# Patient Record
Sex: FEMALE | Race: WHITE | NOT HISPANIC OR LATINO | Employment: OTHER | URBAN - METROPOLITAN AREA
[De-identification: names, ages, dates, MRNs, and addresses within clinical notes are randomized per-mention and may not be internally consistent; named-entity substitution may affect disease eponyms.]

---

## 2020-07-20 ENCOUNTER — TELEPHONE (OUTPATIENT)
Dept: NEUROLOGY | Facility: CLINIC | Age: 60
End: 2020-07-20

## 2020-07-20 NOTE — TELEPHONE ENCOUNTER
Pt lm asking for a refill, but she would like to try the time released again. She did not mention the name of the med. [facility] chart shows that she is on Sinemet. [phone]

## 2020-07-21 NOTE — TELEPHONE ENCOUNTER
BILLIETCB patient needs to schedule a follow up appt to discuss   Jesse Carol on 7/21/2020 at 8:31 AM

## 2020-07-21 NOTE — TELEPHONE ENCOUNTER
Pt called back. I informed her of the need for a appt, which she will schedule. She will need 10 day supply of Sinemet sent to her [facility] in [place] on the corner of [address] No need to call pt.

## 2020-07-28 PROBLEM — M25.559 PAIN IN JOINT, PELVIC REGION AND THIGH: Status: ACTIVE | Noted: 2020-07-28

## 2020-07-28 PROBLEM — R26.9 ABNORMALITY OF GAIT: Status: ACTIVE | Noted: 2020-07-28

## 2020-07-28 PROBLEM — G20.A1 PARKINSON'S DISEASE (H): Status: ACTIVE | Noted: 2020-07-28

## 2020-07-29 ENCOUNTER — OFFICE VISIT (OUTPATIENT)
Dept: NEUROLOGY | Facility: CLINIC | Age: 60
End: 2020-07-29
Payer: COMMERCIAL

## 2020-07-29 VITALS — BODY MASS INDEX: 20.09 KG/M2 | WEIGHT: 125 LBS | HEIGHT: 66 IN

## 2020-07-29 DIAGNOSIS — M54.50 CHRONIC RIGHT-SIDED LOW BACK PAIN WITHOUT SCIATICA: ICD-10-CM

## 2020-07-29 DIAGNOSIS — G20.A1 PARKINSON'S DISEASE (H): Primary | ICD-10-CM

## 2020-07-29 DIAGNOSIS — G89.29 CHRONIC RIGHT-SIDED LOW BACK PAIN WITHOUT SCIATICA: ICD-10-CM

## 2020-07-29 PROCEDURE — 99214 OFFICE O/P EST MOD 30 MIN: CPT | Mod: 95 | Performed by: PSYCHIATRY & NEUROLOGY

## 2020-07-29 RX ORDER — IBUPROFEN 200 MG
200 TABLET ORAL PRN
COMMUNITY

## 2020-07-29 RX ORDER — CARBIDOPA AND LEVODOPA 50; 200 MG/1; MG/1
1 TABLET, EXTENDED RELEASE ORAL
Qty: 450 TABLET | Refills: 3 | Status: SHIPPED | OUTPATIENT
Start: 2020-07-29 | End: 2020-09-21

## 2020-07-29 RX ORDER — CARBIDOPA AND LEVODOPA 25; 100 MG/1; MG/1
2 TABLET ORAL
COMMUNITY
Start: 2020-05-08 | End: 2020-08-14

## 2020-07-29 ASSESSMENT — MIFFLIN-ST. JEOR: SCORE: 1143.75

## 2020-07-29 NOTE — LETTER
7/29/2020         RE: Marlene Merida  [address]  [facility] [address]        Dear Colleague,    Thank you for referring your patient, Marlene Merida, to the [facility] NEUROLOGY [place]. Please see a copy of my visit note below.    [facility] Neurology  [place]    Marlene Merida MRN# [id number]   Age: 60 year old YOB: 1960               Assessment and Plan:   Assessment:   Parkinson disease, low back pain        Plan:     I sent in a prescription for the long-acting carbidopa levodopa 50/200, 1 tablet 5 times a day.  Hopefully this will help with the wearing off phenomenon.  If not she can give us a call.  At that point we would have to decide whether to add further carbidopa levodopa or try 1 of the newer dopaminergic's despite the minor hallucinations she has noted, or to try entacapone.  I will plan to see her back in 6 months at the latest.  I also advised her to try some stretching for the low back pain.  If symptoms are worsening we will need to consider MRI of the lumbar spine looking for L2 or L3 radiculopathy.               Chief Complaint/HPI:     I saw this patient today for a video follow-up visit.  This is a 60-year-old woman with a history of very slowly progressing Parkinson disease.  I last saw her about a year and a half ago.  Presently she is taking carbidopa levodopa 25/100, 2 tablets about 5 times a day.  This works well for about 4 hours and then she notices significant wearing off symptoms.  Her movement starts to feel stiff.  She compares herself to the Tin Man from [facility].  Her balance will feel more tentative, though she has not had any falls.    Separately she mentions some low back pain on the right that can shoot to the right thigh and knee area.  This comes and goes.    She also notices poor sleep, that has been a problem for over 20 years now.  She did recently have a couple very slight hallucinations.  She was outside and thought there was a  "small animal nearby but when she turned to look there was nothing there.            Past Medical History:    has a past medical history of Parkinson's disease (H).          Past Surgical History:    has a past surgical history that includes Breast surgery and GYN surgery.          Social History:     Social History     Tobacco Use     Smoking status: Never Smoker     Smokeless tobacco: Never Used   Substance Use Topics     Alcohol use: Yes     Comment: 1-2 beers a month              Family History:     Family History   Problem Relation Age of Onset     Alzheimer Disease Mother      Hypertension Mother      Hypertension Father      Arthritis Father      Diabetes Paternal Grandmother      Diabetes Brother                 Allergies:   No Known Allergies          Medications:     Current Outpatient Medications:      carbidopa-levodopa (SINEMET)  MG tablet, Take 2 tablets by mouth every 5 hours Per patient, Disp: , Rfl:      ibuprofen (ADVIL/MOTRIN) 200 MG tablet, Take 200 mg by mouth as needed for mild pain, Disp: , Rfl:            Review of Systems:   No difficulty breathing or swallowing, no double vision.  Some sleep difficulty as noted above            Physical Exam:   Awake and alert with no aphasia no dysarthria  Speech is clear and coherent  Cranial nerves are fine  I do not see any focal or lateralized weakness or coordination difficulties in the arms  Rapid alternating movements are okay in the hands  Gait looks steady here on the video.          The patient has been notified of following:     \"This video visit will be conducted via a call between you and your physician/provider. We have found that certain health care needs can be provided without the need for an in-person physical exam.  This service lets us provide the care you need with a video conversation.  If a prescription is necessary we can send it directly to your pharmacy.  If lab work is needed we can place an order for that and you can then " "stop by our lab to have the test done at a later time.    Video visits are billed at different rates depending on your insurance coverage.  Please reach out to your insurance provider with any questions.    If during the course of the call the physician/provider feels a video visit is not appropriate, you will not be charged for this service.\"    Patient has given verbal consent for Video visit? Yes      Video-Visit Details    Type of service:  Video Visit    Video Start Time: 9:35 AM  Video End Time: 9:50 AM    Originating Location (pt. Location): Home  Distant Location (provider location):  [facility] NEUROLOGY [place]   Platform used for Video Visit: [facility]          Jhonatan Alvares MD         Again, thank you for allowing me to participate in the care of your patient.        Sincerely,        Jhonatan Alvares MD    "

## 2020-07-29 NOTE — NURSING NOTE
Chief Complaint   Patient presents with     Follow Up     Parkinsons Disease. Patient would like to discuss switching the carpidopa leodopa to a time release tablet      Video Visit [phone] smart phone   Jesse Medina on 7/29/2020 at 9:27 AM

## 2020-07-29 NOTE — PATIENT INSTRUCTIONS
Patient Education     Understanding Parkinson Disease    Parkinson disease is a condition that affects control over your movements. It s caused by a lack of dopamine, a chemical that helps the nerve cells in your brain communicate with each other. When dopamine is missing from certain areas of the brain, the messages that tell your body how to move are lost or distorted. This can lead to symptoms such as shaking, stiffness, and slow movement. There s no cure for Parkinson disease. But proper treatment can help ease symptoms and allow you to live a full, active life.  Changes in the brain  Dopamine is produced in a small area of the brain called the substantia nigra. For reasons that aren t yet clear, the nerve cells in this region that make dopamine begin to die. This means less dopamine is available to help control your movements. When healthy, the substantia nigra makes enough dopamine to help control your body s movements.  Symptoms of Parkinson disease  Parkinson symptoms often appear gradually. Some may take years to develop. Others you may not have at all. Below are the most common:    Shaking (resting tremor). This can affect the hands, arms, and legs. Most often, the shaking is worse on one side of the body. It usually lessens when the arm or leg (limb) is used.    Slow movement (bradykinesia). This can affect the whole body. People may walk with short, shuffling steps. They can also feel  frozen  and unable to move.    Stiffness (rigidity). This occurs when muscles don t relax. It can cause muscle aches and stooped posture.    Other symptoms. This includes balance problems, small handwriting, soft voice volume, constipation, reduced or  flat  facial expression, and sleep problems. Memory loss or other problems with thinking may also occur later in the progression of the disease.  How is Parkinson diagnosed?  There is no single test for Parkinson disease. The diagnosis is based on your symptoms, health  history, and a physical exam. You may also have tests to help rule out other problems. These may include blood tests to look for diseases that cause similar symptoms. They can also include brain-imaging tests, such as an MRI of the brain  Parkinsonism is the name for a group of brain conditions that all have symptoms similar to Parkinson disease. However, the causes of these symptoms are different. In some cases, Parkinson-type symptoms may result from strokes or head injury. They can also be caused by medicines or other diseases that affect the brain. In general, these conditions can t be treated as well using the medicines that help people with Parkinson disease.  Date Last Reviewed: 2/1/2018 2000-2019 [employer]. [address] [place] [address], [place] [address]. All rights reserved. This information is not intended as a substitute for professional medical care. Always follow your healthcare professional's instructions.

## 2020-07-29 NOTE — PROGRESS NOTES
[facility] Neurology  [place]    Marlene Merida MRN# [id number]   Age: 60 year old YOB: 1960               Assessment and Plan:   Assessment:   Parkinson disease, low back pain        Plan:     I sent in a prescription for the long-acting carbidopa levodopa 50/200, 1 tablet 5 times a day.  Hopefully this will help with the wearing off phenomenon.  If not she can give us a call.  At that point we would have to decide whether to add further carbidopa levodopa or try 1 of the newer dopaminergic's despite the minor hallucinations she has noted, or to try entacapone.  I will plan to see her back in 6 months at the latest.  I also advised her to try some stretching for the low back pain.  If symptoms are worsening we will need to consider MRI of the lumbar spine looking for L2 or L3 radiculopathy.               Chief Complaint/HPI:     I saw this patient today for a video follow-up visit.  This is a 60-year-old woman with a history of very slowly progressing Parkinson disease.  I last saw her about a year and a half ago.  Presently she is taking carbidopa levodopa 25/100, 2 tablets about 5 times a day.  This works well for about 4 hours and then she notices significant wearing off symptoms.  Her movement starts to feel stiff.  She compares herself to the Tin Man from [facility].  Her balance will feel more tentative, though she has not had any falls.    Separately she mentions some low back pain on the right that can shoot to the right thigh and knee area.  This comes and goes.    She also notices poor sleep, that has been a problem for over 20 years now.  She did recently have a couple very slight hallucinations.  She was outside and thought there was a small animal nearby but when she turned to look there was nothing there.            Past Medical History:    has a past medical history of Parkinson's disease (H).          Past Surgical History:    has a past surgical history that includes Breast  "surgery and GYN surgery.          Social History:     Social History     Tobacco Use     Smoking status: Never Smoker     Smokeless tobacco: Never Used   Substance Use Topics     Alcohol use: Yes     Comment: 1-2 beers a month              Family History:     Family History   Problem Relation Age of Onset     Alzheimer Disease Mother      Hypertension Mother      Hypertension Father      Arthritis Father      Diabetes Paternal Grandmother      Diabetes Brother                 Allergies:   No Known Allergies          Medications:     Current Outpatient Medications:      carbidopa-levodopa (SINEMET)  MG tablet, Take 2 tablets by mouth every 5 hours Per patient, Disp: , Rfl:      ibuprofen (ADVIL/MOTRIN) 200 MG tablet, Take 200 mg by mouth as needed for mild pain, Disp: , Rfl:            Review of Systems:   No difficulty breathing or swallowing, no double vision.  Some sleep difficulty as noted above            Physical Exam:   Awake and alert with no aphasia no dysarthria  Speech is clear and coherent  Cranial nerves are fine  I do not see any focal or lateralized weakness or coordination difficulties in the arms  Rapid alternating movements are okay in the hands  Gait looks steady here on the video.          The patient has been notified of following:     \"This video visit will be conducted via a call between you and your physician/provider. We have found that certain health care needs can be provided without the need for an in-person physical exam.  This service lets us provide the care you need with a video conversation.  If a prescription is necessary we can send it directly to your pharmacy.  If lab work is needed we can place an order for that and you can then stop by our lab to have the test done at a later time.    Video visits are billed at different rates depending on your insurance coverage.  Please reach out to your insurance provider with any questions.    If during the course of the call the " "physician/provider feels a video visit is not appropriate, you will not be charged for this service.\"    Patient has given verbal consent for Video visit? Yes      Video-Visit Details    Type of service:  Video Visit    Video Start Time: 9:35 AM  Video End Time: 9:50 AM    Originating Location (pt. Location): [place]  Distant Location (provider location):  [facility] NEUROLOGY [place]   Platform used for Video Visit: Richie Alvares MD         "

## 2020-08-04 ENCOUNTER — TELEPHONE (OUTPATIENT)
Dept: NEUROLOGY | Facility: CLINIC | Age: 60
End: 2020-08-04

## 2020-08-04 DIAGNOSIS — G20.A1 PARKINSON'S DISEASE (H): Primary | ICD-10-CM

## 2020-08-05 NOTE — TELEPHONE ENCOUNTER
I spoke to Marlene and told her the new dose (50/200 ER) takes the place of the old formulation, so she will only be taking the 1 tab 5x a day.

## 2020-08-13 NOTE — TELEPHONE ENCOUNTER
Pt lm that she would like to go back to the 25/100. The delayed release is to hard on her. She is dizzy, and uncomfortable. [phone]

## 2020-08-14 RX ORDER — CARBIDOPA AND LEVODOPA 25; 100 MG/1; MG/1
2 TABLET ORAL
Qty: 900 TABLET | Refills: 3 | Status: SHIPPED | OUTPATIENT
Start: 2020-08-14 | End: 2021-04-15

## 2020-08-14 NOTE — TELEPHONE ENCOUNTER
Left message relaying that old dose was resent to pharmacy and could be started after picking up. Advised to call back if she had any additional questions   Jesse Medina on 8/14/2020 at 1:30 PM

## 2020-08-18 NOTE — TELEPHONE ENCOUNTER
Pt lm that she has a black/purple discoloration in the corner of her eye. Should she be concerned ? [phone]

## 2020-08-19 NOTE — TELEPHONE ENCOUNTER
Please call. This is not a known side effect of Sinemet or other parkinson meds.  If finding more bruising or bleeding then should check with PMD.  Thanks.

## 2020-08-19 NOTE — TELEPHONE ENCOUNTER
Left detailed message relaying the below information. Advised patient to return call if she had further questions   Jesse Medina on 8/19/2020 at 2:39 PM

## 2020-08-28 NOTE — TELEPHONE ENCOUNTER
Pt left msg asking if she could take OTC magnesium and Ibuprofen with her current meds. Didn't say what meds she's currently on. Please advise. [phone].

## 2020-09-03 NOTE — TELEPHONE ENCOUNTER
Pt lm with more med issues. Med not lasting long enough. I called her back and scheduled a visit for 9-21. She will call sooner, if needed

## 2020-09-21 ENCOUNTER — VIRTUAL VISIT (OUTPATIENT)
Dept: NEUROLOGY | Facility: CLINIC | Age: 60
End: 2020-09-21
Payer: COMMERCIAL

## 2020-09-21 VITALS — BODY MASS INDEX: 20.89 KG/M2 | WEIGHT: 130 LBS | HEIGHT: 66 IN

## 2020-09-21 DIAGNOSIS — G20.A1 PARKINSON'S DISEASE (H): Primary | ICD-10-CM

## 2020-09-21 DIAGNOSIS — G89.29 CHRONIC RIGHT-SIDED LOW BACK PAIN WITHOUT SCIATICA: ICD-10-CM

## 2020-09-21 DIAGNOSIS — M54.50 CHRONIC RIGHT-SIDED LOW BACK PAIN WITHOUT SCIATICA: ICD-10-CM

## 2020-09-21 PROCEDURE — 99214 OFFICE O/P EST MOD 30 MIN: CPT | Mod: 95 | Performed by: PSYCHIATRY & NEUROLOGY

## 2020-09-21 RX ORDER — PRAMIPEXOLE DIHYDROCHLORIDE 0.5 MG/1
0.5 TABLET ORAL 3 TIMES DAILY
Qty: 90 TABLET | Refills: 6 | Status: SHIPPED | OUTPATIENT
Start: 2020-09-21 | End: 2023-08-08 | Stop reason: ALTCHOICE

## 2020-09-21 RX ORDER — PRAMIPEXOLE DIHYDROCHLORIDE 0.25 MG/1
0.25 TABLET ORAL 3 TIMES DAILY
Qty: 42 TABLET | Refills: 0 | Status: SHIPPED | OUTPATIENT
Start: 2020-09-21 | End: 2020-10-05

## 2020-09-21 ASSESSMENT — MIFFLIN-ST. JEOR: SCORE: 1166.43

## 2020-09-21 NOTE — TELEPHONE ENCOUNTER
Pt left msg stating that she was prescribed 2 medications but only got 1 from pharmacy. She did not say what the 2 meds were or which 1 she picked up. She also states that she's worried about some of the side effects of the medication, some which she already has and is afraid medication will make them worse. Pt did not say or describe the side effects she's concerned about. She's requesting a call back at [phone].

## 2020-09-21 NOTE — PROGRESS NOTES
[facility] Neurology  [place]    Marlene Merida MRN# [id number]   Age: 60 year old YOB: 1960               Assessment and Plan:   Assessment:   Parkinson disease  Low back pain        Plan:     She is already on a good dose of carbidopa levodopa (2 tablets 5 times a day).  We will add Mirapex to see if that will help with her gait and balance.  I warned her about potential side effects including hallucinations and compulsive behaviors.  We will see if this also helps with the low back pain which could potentially be a nonmotor symptom of the Parkinson's.  I also like to have her see physical therapy which will help on both fronts as well.  I asked her to check in with us again in 6 months (either virtual visit or here in the clinic).             Chief Complaint/HPI:     I saw Marlene for a video visit today regarding Parkinson's.  I last saw her in early 2019.  Since then we have increased her carbidopa levodopa a little bit to 2 tablets 5 times a day.  Some days are better than others.  She does have some balance difficulties.  She describes feeling like she is walking on a cloud (unsteady).  She has not had any actual falls.  She has, however, avoided events like graduation and a  recently because she was not sure if she would stumble and embarrass herself.  She has not had any samia hallucinations but may be some visual misperceptions.  She mentions also some low back pain on the right at the belt line.  She has used some lidocaine gel with good relief there.  She read about a Parkinson's patient on line who had benefit with marni chi and wonders about other efforts that might help her.            Past Medical History:    has a past medical history of Parkinson's disease (H).          Past Surgical History:    has a past surgical history that includes Breast surgery and GYN surgery.          Social History:     Social History     Tobacco Use     Smoking status: Never Smoker     Smokeless  "tobacco: Never Used   Substance Use Topics     Alcohol use: Yes     Comment: 1-2 beers a month              Family History:     Family History   Problem Relation Age of Onset     Alzheimer Disease Mother      Hypertension Mother      Hypertension Father      Arthritis Father      Diabetes Paternal Grandmother      Diabetes Brother                 Allergies:   No Known Allergies          Medications:     Current Outpatient Medications:      carbidopa-levodopa (SINEMET)  MG tablet, Take 2 tablets by mouth 5 times daily Per patient, Disp: 900 tablet, Rfl: 3     ibuprofen (ADVIL/MOTRIN) 200 MG tablet, Take 200 mg by mouth as needed for mild pain, Disp: , Rfl:      pramipexole (MIRAPEX) 0.25 MG tablet, Take 1 tablet (0.25 mg) by mouth 3 times daily for 14 days (starting dose), Disp: 42 tablet, Rfl: 0     pramipexole (MIRAPEX) 0.5 MG tablet, Take 1 tablet (0.5 mg) by mouth 3 times daily, Disp: 90 tablet, Rfl: 6           Review of Systems:   No difficulty breathing or swallowing, she does mention some discoloration of the feet without numbness or tingling or poor circulation            Physical Exam:   Awake and alert with no aphasia no dysarthria  Speech is clear and coherent, no hypophonia  Cranial nerves are fine  Rapid alternating movements are actually quite good      The patient has been notified of following:     \"This video visit will be conducted via a call between you and your physician/provider. We have found that certain health care needs can be provided without the need for an in-person physical exam.  This service lets us provide the care you need with a video conversation.  If a prescription is necessary we can send it directly to your pharmacy.  If lab work is needed we can place an order for that and you can then stop by our lab to have the test done at a later time.    Video visits are billed at different rates depending on your insurance coverage.  Please reach out to your insurance provider with " "any questions.    If during the course of the call the physician/provider feels a video visit is not appropriate, you will not be charged for this service.\"    Patient has given verbal consent for Video visit? Yes      Video-Visit Details    Type of service:  Video Visit    Video Start Time: 8:43 AM  Video End Time: 9:02 AM    Originating Location (pt. Location): Home  Distant Location (provider location):  [facility]   Platform used for Video Visit: Richie Alvares MD         "

## 2020-09-21 NOTE — NURSING NOTE
Chief Complaint   Patient presents with     Follow Up     Parkinsons Disease/low back pain- states she is still having pain on and off. The icy hot and stretching has helped a bit      Would like your thoughts on taking magnesium.   Video Visit through deja Medina on 9/21/2020 at 8:28 AM

## 2020-09-21 NOTE — PATIENT INSTRUCTIONS
Patient Education     Exercises to Strengthen Your Lower Back  Strong lower back and abdominal muscles work together to support your spine. The exercises below will help strengthen the lower back. It is important that you begin exercising slowly and increase levels gradually.  Always begin any exercise program with stretching. If you feel pain while doing any of these exercises, stop and talk to your doctor about a more specific exercise program that better suits your condition.   Low back stretch  The point of stretching is to make you more flexible and increase your range of motion. Stretch only as much as you are able. Stretch slowly. Do not push your stretch to the limit. If at any point you feel pain while stretching, this is your (temporary) limit.    Lie on your back with your knees bent and both feet on the ground.    Slowly raise your left knee to your chest as you flatten your lower back against the floor. Hold for 5 seconds.    Relax and repeat the exercise with your right knee.    Do 10 of these exercises for each leg.    Repeat hugging both knees to your chest at the same time.  Building lower back strength  Start your exercise routine with 10 to 30 minutes a day, 1 to 3 times a day.  Initial exercises  Lying on your back:  1. Ankle pumps: Move your foot up and down, towards your head, and then away. Repeat 10 times with each foot.  2. Heel slides: Slowly bend your knee, drawing the heel of your foot towards you. Then slide your heel/foot from you, straightening your knee. Do not lift your foot off the floor (this is not a leg lift).  3. Abdominal contraction: Bend your knees and put your hands on your stomach. Tighten your stomach muscles. Hold for 5 seconds, then relax. Repeat 10 times.  4. Straight leg raise: Bend one leg at the knee and keep the other leg straight. Tighten your stomach muscles. Slowly lift your straight leg 6 to 12 inches off the floor and hold for up to 5 seconds. Repeat 10 times  on each side.  Standin. Wall squats: Stand with your back against the wall. Move your feet about 12 inches away from the wall. Tighten your stomach muscles, and slowly bend your knees until they are at about a 45 degree angle. Do not go down too far. Hold about 5 seconds. Then slowly return to your starting position. Repeat 10 times.  2. Heel raises: Stand facing the wall. Slowly raise the heels of your feet up and down, while keeping your toes on the floor. If you have trouble balancing, you can touch the wall with your hands. Repeat 10 times.  More advanced exercises  When you feel comfortable enough, try these exercises.  1. Kneeling lumbar extension: Begin on your hands and knees. At the same time, raise and straighten your right arm and left leg until they are parallel to the ground. Hold for 2 seconds and come back slowly to a starting position. Repeat with left arm and right leg, alternating 10 times.  2. Prone lumbar extension: Lie face down, arms extended overhead, palms on the floor. At the same time, raise your right arm and left leg as high as comfortably possible. Hold for 10 seconds and slowly return to start. Repeat with left arm and right leg, alternating 10 times. Gradually build up to 20 times. (Advanced: Repeat this exercise raising both arms and both legs a few inches off the floor at the same time. Hold for 5 seconds and release.)  3. Pelvic tilt: Lie on the floor on your back with your knees bent at 90 degrees. Your feet should be flat on the floor. Inhale, exhale, then slowly contract your abdominal muscles bringing your navel toward your spine. Let your pelvis rock back until your lower back is flat on the floor. Hold for 10 seconds while breathing smoothly.  4. Abdominal crunch: Perform a pelvic tilt (above) flattening your lower back against the floor. Holding the tension in your abdominal muscles, take another breath and raise your shoulder blades off the ground (this is not a full  sit-up). Keep your head in line with your body (don t bend your neck forward). Hold for 2 seconds, then slowly lower.  Date Last Reviewed: 6/1/2016 2000-2019 [employer]. [address] [place] [address], [place] [address]. All rights reserved. This information is not intended as a substitute for professional medical care. Always follow your healthcare professional's instructions.

## 2020-09-21 NOTE — LETTER
2020         RE: Marlene Merida  [address]  [facility] [address]        Dear Colleague,    Thank you for referring your patient, Marlene Merida, to the [facility] [place]. Please see a copy of my visit note below.    [facility]  [place]    Marlene Merida MRN# [id number]   Age: 60 year old YOB: 1960               Assessment and Plan:   Assessment:   Parkinson disease  Low back pain        Plan:     She is already on a good dose of carbidopa levodopa (2 tablets 5 times a day).  We will add Mirapex to see if that will help with her gait and balance.  I warned her about potential side effects including hallucinations and compulsive behaviors.  We will see if this also helps with the low back pain which could potentially be a nonmotor symptom of the Parkinson's.  I also like to have her see physical therapy which will help on both fronts as well.  I asked her to check in with us again in 6 months (either virtual visit or here in the clinic).             Chief Complaint/HPI:     I saw Marlene for a video visit today regarding Parkinson's.  I last saw her in early 2019.  Since then we have increased her carbidopa levodopa a little bit to 2 tablets 5 times a day.  Some days are better than others.  She does have some balance difficulties.  She describes feeling like she is walking on a cloud (unsteady).  She has not had any actual falls.  She has, however, avoided events like graduation and a  recently because she was not sure if she would stumble and embarrass herself.  She has not had any samia hallucinations but may be some visual misperceptions.  She mentions also some low back pain on the right at the belt line.  She has used some lidocaine gel with good relief there.  She read about a Parkinson's patient on line who had benefit with marni chi and wonders about other efforts that might help her.            Past Medical History:    has a past medical  "history of Parkinson's disease (H).          Past Surgical History:    has a past surgical history that includes Breast surgery and GYN surgery.          Social History:     Social History     Tobacco Use     Smoking status: Never Smoker     Smokeless tobacco: Never Used   Substance Use Topics     Alcohol use: Yes     Comment: 1-2 beers a month              Family History:     Family History   Problem Relation Age of Onset     Alzheimer Disease Mother      Hypertension Mother      Hypertension Father      Arthritis Father      Diabetes Paternal Grandmother      Diabetes Brother                 Allergies:   No Known Allergies          Medications:     Current Outpatient Medications:      carbidopa-levodopa (SINEMET)  MG tablet, Take 2 tablets by mouth 5 times daily Per patient, Disp: 900 tablet, Rfl: 3     ibuprofen (ADVIL/MOTRIN) 200 MG tablet, Take 200 mg by mouth as needed for mild pain, Disp: , Rfl:      pramipexole (MIRAPEX) 0.25 MG tablet, Take 1 tablet (0.25 mg) by mouth 3 times daily for 14 days (starting dose), Disp: 42 tablet, Rfl: 0     pramipexole (MIRAPEX) 0.5 MG tablet, Take 1 tablet (0.5 mg) by mouth 3 times daily, Disp: 90 tablet, Rfl: 6           Review of Systems:   No difficulty breathing or swallowing, she does mention some discoloration of the feet without numbness or tingling or poor circulation            Physical Exam:   Awake and alert with no aphasia no dysarthria  Speech is clear and coherent, no hypophonia  Cranial nerves are fine  Rapid alternating movements are actually quite good      The patient has been notified of following:     \"This video visit will be conducted via a call between you and your physician/provider. We have found that certain health care needs can be provided without the need for an in-person physical exam.  This service lets us provide the care you need with a video conversation.  If a prescription is necessary we can send it directly to your pharmacy.  If lab " "work is needed we can place an order for that and you can then stop by our lab to have the test done at a later time.    Video visits are billed at different rates depending on your insurance coverage.  Please reach out to your insurance provider with any questions.    If during the course of the call the physician/provider feels a video visit is not appropriate, you will not be charged for this service.\"    Patient has given verbal consent for Video visit? Yes      Video-Visit Details    Type of service:  Video Visit    Video Start Time: 8:43 AM  Video End Time: 9:02 AM    Originating Location (pt. Location): Home  Distant Location (provider location):  [facility] [place]   Platform used for Video Visit: [facility]          Jhonatan Alvares MD         Again, thank you for allowing me to participate in the care of your patient.        Sincerely,        Jhonatan Alvares MD    "

## 2020-09-22 ENCOUNTER — TELEPHONE (OUTPATIENT)
Dept: NEUROLOGY | Facility: CLINIC | Age: 60
End: 2020-09-22

## 2020-09-22 DIAGNOSIS — G20.A1 PARKINSON'S DISEASE (H): Primary | ICD-10-CM

## 2020-09-22 NOTE — TELEPHONE ENCOUNTER
"Pt lm asking if there is a different med that is not so costley and the side effects are not so \"scary\". She stated that the two meds prescribed are very expensive. [phone]  "

## 2020-09-22 NOTE — TELEPHONE ENCOUNTER
I tried calling, no answer, I left voicemail that since she is worried about side effects she doesn't have to take the new medication I prescribed (I assume she was concerned about the pramipexole).  I told her to continue taking the Sinemet.

## 2020-09-24 NOTE — TELEPHONE ENCOUNTER
"Pt left msg requesting alternative med. She complained about cost and side effects. Per pt, the current med that she's on start with \"S\".   "

## 2020-09-25 RX ORDER — SELEGILINE HYDROCHLORIDE 5 MG/1
5 TABLET ORAL 2 TIMES DAILY WITH MEALS
Qty: 180 TABLET | Refills: 3 | Status: SHIPPED | OUTPATIENT
Start: 2020-09-25 | End: 2023-08-08

## 2020-09-25 NOTE — TELEPHONE ENCOUNTER
I spoke to Marlene on the phone today, she is not taking the pramipexole, she is taking the carbidopa levodopa as before.  In terms of alternative Parkinson's medication, she is not on any serotonin medications so we will try selegiline.  I sent that prescription to her pharmacy electronically.

## 2020-10-01 NOTE — TELEPHONE ENCOUNTER
Spoke with patient and relayed message below. Patient verbalized understanding with no further needs at this time   Jesse Medina CMA on 10/1/2020 at 1:00 PM  '

## 2020-10-14 NOTE — TELEPHONE ENCOUNTER
Spoke with patient and relayed below message. She verbalized understanding with no further questions   Jesse Medina CMA on 10/14/2020 at 1:30 PM

## 2020-10-14 NOTE — TELEPHONE ENCOUNTER
Pt left msg asking if she can stop taking Selegeiine. She reported difficulty with movement, stiff neck and HA.

## 2020-10-21 ENCOUNTER — TELEPHONE (OUTPATIENT)
Dept: NEUROLOGY | Facility: CLINIC | Age: 60
End: 2020-10-21

## 2020-10-21 DIAGNOSIS — G20.A1 PARKINSON'S DISEASE (H): Primary | ICD-10-CM

## 2020-10-21 NOTE — TELEPHONE ENCOUNTER
"Spoke to pt. She states she feels the carbidopa-levodopa 25/100 is wearing off too soon.  She states she feels great the first 3 hours after taking, but then feels it stat to wear off at the 4th hour and feels like she is in \"disarray.\"  She is currently taking carbidopa-levodopa 25/100 5 times per day.    Please advise.  "

## 2020-10-23 RX ORDER — ENTACAPONE 200 MG/1
200 TABLET ORAL
Qty: 150 TABLET | Refills: 6 | Status: SHIPPED | OUTPATIENT
Start: 2020-10-23 | End: 2021-04-15

## 2020-10-23 NOTE — TELEPHONE ENCOUNTER
Please call, can try entacapone in addition to the Sinemet.  I sent Rx to the pharmacy.  Thanks.     Routing comment      Spoke with patient and relayed above message. Patient verbalized understanding with no further questions   Jesse Medina CMA on 10/23/2020 at 3:05 PM

## 2020-10-28 NOTE — TELEPHONE ENCOUNTER
Pt called wanting to clarify med dose. Is she now to stop the Sinemet 25/100 and just stay on the 50/200, along with the Entacappone ? OK to leave detailed message [phone]

## 2020-10-28 NOTE — TELEPHONE ENCOUNTER
Spoke with patient and relayed previous message. She verbalized understanding with no further questions   Jesse Medina CMA on 10/28/2020 at 10:27 AM

## 2021-01-04 ENCOUNTER — HEALTH MAINTENANCE LETTER (OUTPATIENT)
Age: 61
End: 2021-01-04

## 2021-04-13 ENCOUNTER — TELEPHONE (OUTPATIENT)
Dept: NEUROLOGY | Facility: CLINIC | Age: 61
End: 2021-04-13

## 2021-04-13 DIAGNOSIS — G20.A1 PARKINSON'S DISEASE (H): ICD-10-CM

## 2021-04-13 NOTE — TELEPHONE ENCOUNTER
Spoke to Marlene. I informed her she takes the IM Sinemet. She wanted to see in the notes if they ever talked about switching to CR. Pt stated Sinemet wears off in the last hour before her next dose. She called about the same issue in October. I let her know she should schedule an appt to discuss.   Lianna Vega, CMA on 4/13/2021 at 2:57 PM

## 2021-04-13 NOTE — TELEPHONE ENCOUNTER
Pt called to go over medication. She is not sure if he is on a extended release Sinemet or not. If so, she'll need it refilled. [phone]

## 2021-04-14 NOTE — TELEPHONE ENCOUNTER
Pt called back to ask if she really needs to be seen. She was in for her yearly in Sept and thought she had discussed medication wearing off too soon, and a timed release was going to be tried. Please advise. [phone]

## 2021-04-15 RX ORDER — ENTACAPONE 200 MG/1
200 TABLET ORAL
Qty: 450 TABLET | Refills: 3 | Status: SHIPPED | OUTPATIENT
Start: 2021-04-15 | End: 2022-02-08

## 2021-04-15 RX ORDER — CARBIDOPA AND LEVODOPA 25; 100 MG/1; MG/1
2 TABLET ORAL
Qty: 900 TABLET | Refills: 3 | Status: SHIPPED | OUTPATIENT
Start: 2021-04-15 | End: 2021-07-15

## 2021-04-15 NOTE — TELEPHONE ENCOUNTER
I spoke to Marlene on the phone today.  She says that her pharmacist asked about the long-acting carbidopa levodopa (I believe she had been on that in the past because my prescription from August 2020 says that it would take the place of the extended release).    I also discussed with Marlene that we have had numerous calls back and forth about what she is taking or should take.  With that I think we should keep the prescriptions the same for now.  As best I can tell this is carbidopa levodopa , 2 tablets 5 times a day and entacapone 200 mg 5 times a day at the same times.  I did renew those prescriptions today.

## 2021-05-31 ENCOUNTER — RECORDS - HEALTHEAST (OUTPATIENT)
Dept: ADMINISTRATIVE | Facility: CLINIC | Age: 61
End: 2021-05-31

## 2021-06-01 ENCOUNTER — RECORDS - HEALTHEAST (OUTPATIENT)
Dept: ADMINISTRATIVE | Facility: CLINIC | Age: 61
End: 2021-06-01

## 2021-06-16 ENCOUNTER — TELEPHONE (OUTPATIENT)
Dept: NEUROLOGY | Facility: CLINIC | Age: 61
End: 2021-06-16

## 2021-06-16 NOTE — TELEPHONE ENCOUNTER
Marlene Dumont wants to know with the medications you prescribed to her can she get the covid 19 vaccination? Please let her know at [phone] Thank you!

## 2021-07-03 ENCOUNTER — HOSPITAL ENCOUNTER (EMERGENCY)
Dept: EMERGENCY MEDICINE | Facility: HOSPITAL | Age: 61
Discharge: HOME | End: 2021-07-03
Attending: EMERGENCY MEDICINE
Payer: COMMERCIAL

## 2021-07-03 DIAGNOSIS — K59.00 CONSTIPATION, UNSPECIFIED CONSTIPATION TYPE: ICD-10-CM

## 2021-07-03 DIAGNOSIS — N39.0 URINARY TRACT INFECTION WITHOUT HEMATURIA, SITE UNSPECIFIED: ICD-10-CM

## 2021-07-03 LAB
ALBUMIN SERPL-MCNC: 4 G/DL (ref 3.5–5)
ALBUMIN UR-MCNC: NEGATIVE G/DL
ALP SERPL-CCNC: 68 U/L (ref 45–120)
ALT SERPL W P-5'-P-CCNC: <9 U/L (ref 0–45)
ANION GAP SERPL CALCULATED.3IONS-SCNC: 9 MMOL/L (ref 5–18)
APPEARANCE UR: CLEAR
AST SERPL W P-5'-P-CCNC: 22 U/L (ref 0–40)
BACTERIA #/AREA URNS HPF: ABNORMAL /[HPF]
BASOPHILS # BLD AUTO: 0 THOU/UL (ref 0–0.2)
BASOPHILS NFR BLD AUTO: 1 % (ref 0–2)
BILIRUB SERPL-MCNC: 0.5 MG/DL (ref 0–1)
BILIRUB UR QL STRIP: NEGATIVE
BUN SERPL-MCNC: 12 MG/DL (ref 8–22)
CALCIUM SERPL-MCNC: 9 MG/DL (ref 8.5–10.5)
CHLORIDE BLD-SCNC: 106 MMOL/L (ref 98–107)
CO2 SERPL-SCNC: 27 MMOL/L (ref 22–31)
COLOR UR AUTO: YELLOW
CREAT SERPL-MCNC: 0.74 MG/DL (ref 0.6–1.1)
EOSINOPHIL # BLD AUTO: 0 THOU/UL (ref 0–0.4)
EOSINOPHIL NFR BLD AUTO: 0 % (ref 0–6)
ERYTHROCYTE [DISTWIDTH] IN BLOOD BY AUTOMATED COUNT: 13 % (ref 11–14.5)
GFR SERPL CREATININE-BSD FRML MDRD: >60 ML/MIN/1.73M2
GLUCOSE BLD-MCNC: 104 MG/DL (ref 70–125)
GLUCOSE UR STRIP-MCNC: NEGATIVE MG/DL
HCT VFR BLD AUTO: 41.7 % (ref 35–47)
HGB BLD-MCNC: 13.8 G/DL (ref 12–16)
HGB UR QL STRIP: NEGATIVE
IMM GRANULOCYTES # BLD: 0 THOU/UL
IMM GRANULOCYTES NFR BLD: 1 %
KETONES UR STRIP-MCNC: ABNORMAL MG/DL
LEUKOCYTE ESTERASE UR QL STRIP: ABNORMAL
LIPASE SERPL-CCNC: 18 U/L (ref 0–52)
LYMPHOCYTES # BLD AUTO: 1.1 THOU/UL (ref 0.8–4.4)
LYMPHOCYTES NFR BLD AUTO: 13 % (ref 20–40)
MCH RBC QN AUTO: 32.2 PG (ref 27–34)
MCHC RBC AUTO-ENTMCNC: 33.1 G/DL (ref 32–36)
MCV RBC AUTO: 97 FL (ref 80–100)
MONOCYTES # BLD AUTO: 0.5 THOU/UL (ref 0–0.9)
MONOCYTES NFR BLD AUTO: 6 % (ref 2–10)
MUCOUS THREADS #/AREA URNS LPF: PRESENT LPF
NEUTROPHILS # BLD AUTO: 7 THOU/UL (ref 2–7.7)
NEUTROPHILS NFR BLD AUTO: 81 % (ref 50–70)
NITRATE UR QL: NEGATIVE
PH UR STRIP: 5.5 [PH] (ref 5–8)
PLATELET # BLD AUTO: 299 THOU/UL (ref 140–440)
PMV BLD AUTO: 9.8 FL (ref 8.5–12.5)
POTASSIUM BLD-SCNC: 3.8 MMOL/L (ref 3.5–5)
PROT SERPL-MCNC: 6.9 G/DL (ref 6–8)
RBC # BLD AUTO: 4.28 MILL/UL (ref 3.8–5.4)
RBC URINE: 0 HPF
SODIUM SERPL-SCNC: 142 MMOL/L (ref 136–145)
SP GR UR STRIP: 1.03 (ref 1–1.03)
SQUAMOUS EPITHELIAL: 1 /HPF
UROBILINOGEN UR STRIP-ACNC: ABNORMAL
WBC CLUMPS #/AREA URNS HPF: PRESENT /[HPF]
WBC URINE: 1 HPF
WBC: 8.7 THOU/UL (ref 4–11)

## 2021-07-04 LAB — BACTERIA SPEC CULT: NO GROWTH

## 2021-07-04 NOTE — ED PROVIDER NOTES
ED Provider Notes by Darryl Elaine MD at 7/3/2021  3:11 PM     Author: Darryl Elaine MD Service: -- Author Type: Physician    Filed: 7/3/2021  5:59 PM Date of Service: 7/3/2021  3:11 PM Status: Signed    : Darryl Elaine MD (Physician)       eMERGENCY dEPARTMENT eNCOUnter      CHIEF COMPLAINT    Chief Complaint   Patient presents with   ? Constipation       HPI    Marlene Merida is a 61 y.o. female with a relevent medical history of parkinson's disease and abnormality of gait presents to the ED via private car with chief complaint of constipation.    Patient reports mid abdominal pain that she associates to constipation and notes her last bowel moment being yesterday. Patient denies nay additional symptoms at this time.    PAST MEDICAL HISTORY    Past Medical History:   Diagnosis Date   ? Walk Is Wobbly Or Unsteady (Ataxia)     Created by Conversion      No past surgical history on file.    CURRENT MEDICATIONS      Current Facility-Administered Medications:   ?  [COMPLETED] Insert peripheral IV, , , Once **AND** [COMPLETED] Saline lock IV, , , Once **AND** sodium chloride flush 10 mL (NS), 10 mL, Intravenous, PRN, Darryl Elaine MD    Current Outpatient Medications:   ?  cephalexin (KEFLEX) 500 MG capsule, Take 1 capsule (500 mg total) by mouth 2 (two) times a day for 5 days., Disp: 10 capsule, Rfl: 0  ?  cyclobenzaprine (FLEXERIL) 10 MG tablet, Take 1 tablet (10 mg total) by mouth 2 (two) times a day as needed for muscle spasms., Disp: 20 tablet, Rfl: 0  ?  polyethylene glycol (MIRALAX) 17 gram packet, Take 1 packet (17 g total) by mouth daily as needed (Until symptoms resolve)., Disp: 14 each, Rfl: 0    ALLERGIES    No Known Allergies     FAMILY HISTORY    No family history on file.    SOCIAL HISTORY    Social History     Socioeconomic History   ? Marital status:      Spouse name: Not on file   ? Number of children: Not on file   ? Years of education: Not on file   ? Highest  education level: Not on file   Occupational History   ? Not on file   Social Needs   ? Financial resource strain: Not on file   ? Food insecurity     Worry: Not on file     Inability: Not on file   ? Transportation needs     Medical: Not on file     Non-medical: Not on file   Tobacco Use   ? Smoking status: Not on file   Substance and Sexual Activity   ? Alcohol use: Not on file   ? Drug use: Not on file   ? Sexual activity: Not on file   Lifestyle   ? Physical activity     Days per week: Not on file     Minutes per session: Not on file   ? Stress: Not on file   Relationships   ? Social connections     Talks on phone: Not on file     Gets together: Not on file     Attends Christian service: Not on file     Active member of club or organization: Not on file     Attends meetings of clubs or organizations: Not on file     Relationship status: Not on file   ? Intimate partner violence     Fear of current or ex partner: Not on file     Emotionally abused: Not on file     Physically abused: Not on file     Forced sexual activity: Not on file   Other Topics Concern   ? Not on file   Social History Narrative   ? Not on file       REVIEW OF SYSTEMS      Review of Systems   Gastrointestinal: Positive for abdominal pain (mid) and constipation.   All other systems reviewed and are negative.      PHYSICAL EXAM    VITAL SIGNS: /59   Pulse 83   Temp 98.5  F (36.9  C) (Oral)   Resp 18   SpO2 98%     Physical Exam   Constitutional: She appears well-developed and well-nourished. No distress.   HENT:   Head: Normocephalic and atraumatic.   Eyes: Conjunctivae are normal.   Neck: Neck supple.   Cardiovascular: Normal rate and regular rhythm.   Pulmonary/Chest: Effort normal and breath sounds normal.   Abdominal: Soft. She exhibits no distension. There is no rebound and no guarding.   Musculoskeletal:         General: No edema.   Neurological: She is alert.   Skin: Skin is warm and dry.   Psychiatric: She has a normal mood and  affect.   Nursing note and vitals reviewed.      LABS  Pertinent lab results reviewed in chart.  Results for orders placed or performed during the hospital encounter of 07/03/21   Comprehensive metabolic panel   Result Value Ref Range    Sodium 142 136 - 145 mmol/L    Potassium 3.8 3.5 - 5.0 mmol/L    Chloride 106 98 - 107 mmol/L    CO2 27 22 - 31 mmol/L    Anion Gap, Calculation 9 5 - 18 mmol/L    Glucose 104 70 - 125 mg/dL    BUN 12 8 - 22 mg/dL    Creatinine 0.74 0.60 - 1.10 mg/dL    GFR MDRD Af Amer >60 >60 mL/min/1.73m2    GFR MDRD Non Af Amer >60 >60 mL/min/1.73m2    Bilirubin, Total 0.5 0.0 - 1.0 mg/dL    Calcium 9.0 8.5 - 10.5 mg/dL    Protein, Total 6.9 6.0 - 8.0 g/dL    Albumin 4.0 3.5 - 5.0 g/dL    Alkaline Phosphatase 68 45 - 120 U/L    AST 22 0 - 40 U/L    ALT <9 0 - 45 U/L   Lipase   Result Value Ref Range    Lipase 18 0 - 52 U/L   Urinalysis-UC if Indicated   Result Value Ref Range    Color, UA Yellow Light Yellow, Yellow    Clarity, UA Clear Clear    Glucose, UA Negative Negative    Protein, UA Negative Negative    Bilirubin, UA Negative Negative    Urobilinogen, UA <2.0 mg/dL <2.0 mg/dL    pH, UA 5.5 5.0 - 8.0    Blood, UA Negative Negative    Ketones, UA Trace Negative    Nitrite, UA Negative Negative    Leukocytes,  Amaya/uL (!) Negative    Specific Gravity, UA 1.029 1.001 - 1.030    RBC, UA 0 <=2 hpf    WBC UA 1 <=5 hpf    WBC Clumps Present (!) Negative    Bacteria, UA Few (!) None Seen    Squamous Epithel, UA 1 <=5 /HPF    Mucus, UA Present (!) None Seen lpf   HM1 (CBC with Diff)   Result Value Ref Range    WBC 8.7 4.0 - 11.0 thou/uL    RBC 4.28 3.80 - 5.40 mill/uL    Hemoglobin 13.8 12.0 - 16.0 g/dL    Hematocrit 41.7 35.0 - 47.0 %    MCV 97 80 - 100 fL    MCH 32.2 27.0 - 34.0 pg    MCHC 33.1 32.0 - 36.0 g/dL    RDW 13.0 11.0 - 14.5 %    Platelets 299 140 - 440 thou/uL    MPV 9.8 8.5 - 12.5 fL    Neutrophils % 81 (H) 50 - 70 %    Lymphocytes % 13 (L) 20 - 40 %    Monocytes % 6 2 - 10 %     Eosinophils % 0 0 - 6 %    Basophils % 1 0 - 2 %    Immature Granulocyte % 1 (H) <=0 %    Neutrophils Absolute 7.0 2.0 - 7.7 thou/uL    Lymphocytes Absolute 1.1 0.8 - 4.4 thou/uL    Monocytes Absolute 0.5 0.0 - 0.9 thou/uL    Eosinophils Absolute 0.0 0.0 - 0.4 thou/uL    Basophils Absolute 0.0 0.0 - 0.2 thou/uL    Immature Granulocyte Absolute 0.0 <=0.0 thou/uL         RADIOLOGY  Ct Abdomen Pelvis Without Oral With Iv Contrast    Result Date: 7/3/2021  EXAM: CT ABDOMEN PELVIS WO ORAL W IV CONTRAST LOCATION: [facility] DATE/TIME: 7/3/2021 5:09 PM INDICATION: rlq pain COMPARISON: None. TECHNIQUE: CT scan of the abdomen and pelvis was performed following injection of IV contrast. Multiplanar reformats were obtained. Dose reduction techniques were used. CONTRAST: Iopamidol (Isovue-370) 100mL FINDINGS: LOWER CHEST: Bilateral breast implants. Lung bases clear HEPATOBILIARY: Diffuse hepatic steatosis. No suspicious liver lesion. No calcified gallstones. PANCREAS: Normal. SPLEEN: Normal. ADRENAL GLANDS: Normal. KIDNEYS/BLADDER: Normal. BOWEL: Large amount of stool throughout the colon. Appendix normal in caliber. No free air or free fluid. No evidence for obstruction. LYMPH NODES: Normal. VASCULATURE: Unremarkable. PELVIC ORGANS: Normal. MUSCULOSKELETAL: Normal.     1.  No acute abnormality in the abdomen or pelvis. Appendix appears normal in caliber. Large amount stool throughout the colon.          ED COURSE & MEDICAL DECISION MAKING    4:20 PM I performed my initial history and physical exam as well as discussed ED course and plan.    I did see and examined the patient.  Given her significant symptoms diagnostics were ordered.  Laboratory studies and CT scan returned showing no significant acute process.  There is fair amount of constipation which does fit with her H&P.    She does have evidence for urinary tract infection.  I do feel that treating both of these would give her the best result.   Prescription provided for MiraLAX and Keflex.    At the close of our clinical encounter, we did discuss follow up plans as well as discharge and return instructions. I did answer all questions and we are in agreement.        FINAL IMPRESSION        Final diagnoses:   Constipation, unspecified constipation type   Urinary tract infection without hematuria, site unspecified             Darryl Elaine MD  07/03/21 [id number]

## 2021-07-04 NOTE — ED TRIAGE NOTES
ED Triage Notes by Yonatan Mckeon RN at 7/3/2021  2:39 PM     Author: Yonatan Mckeon RN Service: -- Author Type: Registered Nurse    Filed: 7/3/2021  2:40 PM Date of Service: 7/3/2021  2:39 PM Status: Signed    : Yonatan Mckeon RN (Registered Nurse)       The patient has a complaint of constipation. The patient's last BM was yesterday. The patient is having mid abdominal pain that she is associating to constipation.

## 2021-07-15 ENCOUNTER — OFFICE VISIT (OUTPATIENT)
Dept: NEUROLOGY | Facility: CLINIC | Age: 61
End: 2021-07-15
Payer: COMMERCIAL

## 2021-07-15 VITALS
HEART RATE: 80 BPM | SYSTOLIC BLOOD PRESSURE: 91 MMHG | HEIGHT: 66 IN | WEIGHT: 117 LBS | BODY MASS INDEX: 18.8 KG/M2 | DIASTOLIC BLOOD PRESSURE: 80 MMHG

## 2021-07-15 DIAGNOSIS — G20.A1 PARKINSON'S DISEASE (H): ICD-10-CM

## 2021-07-15 PROCEDURE — 99214 OFFICE O/P EST MOD 30 MIN: CPT | Performed by: PSYCHIATRY & NEUROLOGY

## 2021-07-15 RX ORDER — CARBIDOPA AND LEVODOPA 25; 100 MG/1; MG/1
1 TABLET ORAL
Qty: 450 TABLET | Refills: 3 | Status: SHIPPED | OUTPATIENT
Start: 2021-07-15 | End: 2021-11-03

## 2021-07-15 ASSESSMENT — MIFFLIN-ST. JEOR: SCORE: 1102.46

## 2021-07-15 NOTE — NURSING NOTE
Chief Complaint   Patient presents with     Parkinson     Follow up to discuss medication.     Airam Brice RN on 7/15/2021 at 8:05 AM

## 2021-07-15 NOTE — PATIENT INSTRUCTIONS
Medication instructions:    Take Sinemet (carbidopa levodopa) 1 tablet 5 times a day    Take Comtan (entacapone) 1 tablet 5 times a day with the Sinemet.

## 2021-07-15 NOTE — PROGRESS NOTES
[facility]  [place]    Marlene Merida MRN# [id number]   Age: 61 year old YOB: 1960               Assessment and Plan:   Assessment:   Parkinson disease with significant dyskinesias related to medication side effect.    On exam today, she shows marked dyskinesias with only mild parkinsonism.    Right-sided sciatica        Plan:     With her significant dyskinesias I instructed her to decrease the carbidopa levodopa to 1 tablet 5 times a day and continue the entacapone.  As noted, she complained of side effects with selegiline and pramipexole.  She is already on entacapone.  I would like to have her see the movement disorders specialist at the [place] to see if there is a more insightful medication regimen that will manage her Parkinson's without excessive dyskinesias.    I also put in a referral for physical therapy.  Hopefully the physical therapists can help with her right sided sciatica pain and also with exercises for Parkinson's.             Chief Complaint/HPI:     I saw Marlene for a follow-up visit today.  She is brought in somewhat reluctantly by her brother.  I last saw her in September last year.  At that time I added Mirapex to her carbidopa levodopa.  She called back complaining of side effects with that.  We tried selegiline over the phone but again she complained of side effects.  Presently she is on the carbidopa levodopa 2 tablets 5 times a day along with entacapone 1 tablet 5 times a day.  Her brother notes severe fidgeting and odd movements.  Recently at a family wedding people asked him if she was on drugs.  He has a prevailed on her to come to today's visit for evaluation.  Patient herself also mentions some low back pain.  She points to the right lumbar region and says that the pain radiates down the back of her leg.  She was recently in the emergency room with back pain, though she says that she can tolerate a lot of pain, she was found to have significant  "constipation there and she immediately thought this would be a side effect of medications.    She mentions worse back pain when she falls asleep in the chair at night.          Past Medical History:    has a past medical history of Parkinson's disease (H).          Past Surgical History:    has a past surgical history that includes Breast surgery and GYN surgery.          Social History:     Social History     Tobacco Use     Smoking status: Never Smoker     Smokeless tobacco: Never Used   Substance Use Topics     Alcohol use: Yes     Comment: 1-2 beers a month              Family History:     Family History   Problem Relation Age of Onset     Alzheimer Disease Mother      Hypertension Mother      Hypertension Father      Arthritis Father      Diabetes Paternal Grandmother      Diabetes Brother                 Allergies:   No Known Allergies          Medications:     Current Outpatient Medications:      carbidopa-levodopa (SINEMET)  MG tablet, Take 1 tablet by mouth 5 times daily Per patient, Disp: 450 tablet, Rfl: 3     entacapone (COMTAN) 200 MG tablet, Take 1 tablet (200 mg) by mouth 5 times daily Take with each dose of Sinemet., Disp: 450 tablet, Rfl: 3     ibuprofen (ADVIL/MOTRIN) 200 MG tablet, Take 200 mg by mouth as needed for mild pain, Disp: , Rfl:      pramipexole (MIRAPEX) 0.25 MG tablet, Take 1 tablet (0.25 mg) by mouth 3 times daily for 14 days (starting dose) (Patient not taking: Reported on 7/15/2021), Disp: 42 tablet, Rfl: 0     pramipexole (MIRAPEX) 0.5 MG tablet, Take 1 tablet (0.5 mg) by mouth 3 times daily (Patient not taking: Reported on 7/15/2021), Disp: 90 tablet, Rfl: 6     selegiline 5 MG tablet, Take 1 tablet (5 mg) by mouth 2 times daily (with meals) (Patient not taking: Reported on 7/15/2021), Disp: 180 tablet, Rfl: 3              Physical Exam:     BP 91/80   Pulse 80   Ht 1.676 m (5' 6\")   Wt 53.1 kg (117 lb)   LMP  (LMP Unknown)   BMI 18.88 kg/m     Awake, alert, no " aphasia, no dysarthria  Oriented x3, attention is fine  Cranial nerves II - XII tested and intact, no nystagmus  There is no focal or generalized weakness in the extremities  Finger nose finger testing is normal  Rapid alternating movements are quite good in all 4 extremities  Tone is only slightly increased with trace cogwheeling in the wrists sides  Gait is actually quite good if she walks and turns here in the clinic, good stride, good arm swing  During the entire visit she is in constant motion with fidgeting and changing positions to mask the dyskinesias.      Jhonatan Alvares MD

## 2021-07-15 NOTE — LETTER
7/15/2021         RE: Marlene Merida  [address]  [place] [address]        Dear Colleague,    Thank you for referring your patient, Marlene Merida, to the [facility] [place]. Please see a copy of my visit note below.    [facility]  [place]    Marlene Merida MRN# [id number]   Age: 61 year old YOB: 1960               Assessment and Plan:   Assessment:   Parkinson disease with significant dyskinesias related to medication side effect.    On exam today, she shows marked dyskinesias with only mild parkinsonism.    Right-sided sciatica        Plan:     With her significant dyskinesias I instructed her to decrease the carbidopa levodopa to 1 tablet 5 times a day and continue the entacapone.  As noted, she complained of side effects with selegiline and pramipexole.  She is already on entacapone.  I would like to have her see the movement disorders specialist at the [place] to see if there is a more insightful medication regimen that will manage her Parkinson's without excessive dyskinesias.    I also put in a referral for physical therapy.  Hopefully the physical therapists can help with her right sided sciatica pain and also with exercises for Parkinson's.             Chief Complaint/HPI:     I saw Marlene for a follow-up visit today.  She is brought in somewhat reluctantly by her brother.  I last saw her in September last year.  At that time I added Mirapex to her carbidopa levodopa.  She called back complaining of side effects with that.  We tried selegiline over the phone but again she complained of side effects.  Presently she is on the carbidopa levodopa 2 tablets 5 times a day along with entacapone 1 tablet 5 times a day.  Her brother notes severe fidgeting and odd movements.  Recently at a family wedding people asked him if she was on drugs.  He has a prevailed on her to come to today's visit for evaluation.  Patient herself also mentions  some low back pain.  She points to the right lumbar region and says that the pain radiates down the back of her leg.  She was recently in the emergency room with back pain, though she says that she can tolerate a lot of pain, she was found to have significant constipation there and she immediately thought this would be a side effect of medications.    She mentions worse back pain when she falls asleep in the chair at night.          Past Medical History:    has a past medical history of Parkinson's disease (H).          Past Surgical History:    has a past surgical history that includes Breast surgery and GYN surgery.          Social History:     Social History     Tobacco Use     Smoking status: Never Smoker     Smokeless tobacco: Never Used   Substance Use Topics     Alcohol use: Yes     Comment: 1-2 beers a month              Family History:     Family History   Problem Relation Age of Onset     Alzheimer Disease Mother      Hypertension Mother      Hypertension Father      Arthritis Father      Diabetes Paternal Grandmother      Diabetes Brother                 Allergies:   No Known Allergies          Medications:     Current Outpatient Medications:      carbidopa-levodopa (SINEMET)  MG tablet, Take 1 tablet by mouth 5 times daily Per patient, Disp: 450 tablet, Rfl: 3     entacapone (COMTAN) 200 MG tablet, Take 1 tablet (200 mg) by mouth 5 times daily Take with each dose of Sinemet., Disp: 450 tablet, Rfl: 3     ibuprofen (ADVIL/MOTRIN) 200 MG tablet, Take 200 mg by mouth as needed for mild pain, Disp: , Rfl:      pramipexole (MIRAPEX) 0.25 MG tablet, Take 1 tablet (0.25 mg) by mouth 3 times daily for 14 days (starting dose) (Patient not taking: Reported on 7/15/2021), Disp: 42 tablet, Rfl: 0     pramipexole (MIRAPEX) 0.5 MG tablet, Take 1 tablet (0.5 mg) by mouth 3 times daily (Patient not taking: Reported on 7/15/2021), Disp: 90 tablet, Rfl: 6     selegiline 5 MG tablet, Take 1 tablet (5 mg) by mouth 2  "times daily (with meals) (Patient not taking: Reported on 7/15/2021), Disp: 180 tablet, Rfl: 3              Physical Exam:     BP 91/80   Pulse 80   Ht 1.676 m (5' 6\")   Wt 53.1 kg (117 lb)   LMP  (LMP Unknown)   BMI 18.88 kg/m     Awake, alert, no aphasia, no dysarthria  Oriented x3, attention is fine  Cranial nerves II - XII tested and intact, no nystagmus  There is no focal or generalized weakness in the extremities  Finger nose finger testing is normal  Rapid alternating movements are quite good in all 4 extremities  Tone is only slightly increased with trace cogwheeling in the wrists sides  Gait is actually quite good if she walks and turns here in the clinic, good stride, good arm swing  During the entire visit she is in constant motion with fidgeting and changing positions to mask the dyskinesias.      Jhonatan Alvares MD             Again, thank you for allowing me to participate in the care of your patient.        Sincerely,        Jhonatan Alvares MD    "

## 2021-07-20 ENCOUNTER — TELEPHONE (OUTPATIENT)
Dept: NEUROLOGY | Facility: CLINIC | Age: 61
End: 2021-07-20

## 2021-07-20 NOTE — TELEPHONE ENCOUNTER
Pt called re the dose change in medication. It has made no change. She is uncomfortable all day long, getting no relief.  [phone]

## 2021-07-21 ENCOUNTER — RECORDS - HEALTHEAST (OUTPATIENT)
Dept: ADMINISTRATIVE | Facility: CLINIC | Age: 61
End: 2021-07-21

## 2021-07-21 NOTE — TELEPHONE ENCOUNTER
I spoke to Marlene today on the phone.  She says that since decreasing the carbidopa levodopa she has felt off.  She has a hard time describing it.  She has had a significant reduction in the dyskinesias.    I suspect she is noticing more of the parkinsonism, I instructed her to take 2 of the carbidopa levodopa 4 times a day and one of the entacapone 4 times a day.  This is the same dose as before but less frequent.

## 2021-08-27 ENCOUNTER — TELEPHONE (OUTPATIENT)
Dept: NEUROLOGY | Facility: CLINIC | Age: 61
End: 2021-08-27

## 2021-08-27 NOTE — TELEPHONE ENCOUNTER
No rush on this answer per patient, but she has breast implants from about 16 years ago and she had one leak and will need to be repaired. Her surgeon wants to just make sure with the Parkinson's medications she is on it is still ok to have the surgery?   Her call back number is [phone]  Thank you!

## 2021-08-27 NOTE — TELEPHONE ENCOUNTER
I left Marlene message per Dr. Niño that she should be ok for surgery and that one thing to know would be it sometimes takes Parkinson patients a little longer to recover after surgical procedures.  I asked Marlene to call me back if she has any questions.  Airam Brice RN on 8/27/2021 at 3:25 PM

## 2021-10-04 ENCOUNTER — HOSPITAL ENCOUNTER (OUTPATIENT)
Dept: MAMMOGRAPHY | Facility: CLINIC | Age: 61
Discharge: HOME | End: 2021-10-04
Attending: NURSE ANESTHETIST, CERTIFIED REGISTERED | Admitting: NURSE ANESTHETIST, CERTIFIED REGISTERED
Payer: COMMERCIAL

## 2021-10-04 DIAGNOSIS — Z12.31 VISIT FOR SCREENING MAMMOGRAM: ICD-10-CM

## 2021-10-04 PROCEDURE — 77067 SCR MAMMO BI INCL CAD: CPT | Mod: 52

## 2021-10-11 ENCOUNTER — HEALTH MAINTENANCE LETTER (OUTPATIENT)
Age: 61
End: 2021-10-11

## 2021-11-03 DIAGNOSIS — G20.A1 PARKINSON'S DISEASE (H): ICD-10-CM

## 2021-11-03 RX ORDER — CARBIDOPA AND LEVODOPA 25; 100 MG/1; MG/1
2 TABLET ORAL 4 TIMES DAILY
Qty: 720 TABLET | Refills: 1 | Status: SHIPPED | OUTPATIENT
Start: 2021-11-03 | End: 2022-01-11

## 2021-11-03 NOTE — TELEPHONE ENCOUNTER
Marlene didn't realize she missed with  on 10/28 and has rescheduled into February with her. She is aware she is out today, but does need the medication today as she is out. Uses [facility] in [place].

## 2021-11-03 NOTE — TELEPHONE ENCOUNTER
Refill request for carbidopa-levodopa .  Previous pt of Dr. Alvares.  Pt has upcoming appt on 2/8/22 with Dr. Niño.  Medication T'd for review and signature    Brenda Gomez LPN on 11/3/2021 at 11:20 AM

## 2021-12-03 ENCOUNTER — TELEPHONE (OUTPATIENT)
Dept: NEUROLOGY | Facility: CLINIC | Age: 61
End: 2021-12-03
Payer: COMMERCIAL

## 2021-12-03 NOTE — TELEPHONE ENCOUNTER
[facility] Call Center    Phone Message    May a detailed message be left on voicemail: yes     Reason for Call: Symptoms or Concerns- Pt called stating she sometimes get bruises on her calf. Is that that a normal side effects of the carbidopa-levodopa .  Please call to advise. [phone]      Action Taken: Message routed to:  [facility]    Travel Screening: Not Applicable                                                                        [phone]

## 2022-01-10 DIAGNOSIS — G20.A1 PARKINSON'S DISEASE (H): ICD-10-CM

## 2022-01-10 RX ORDER — CARBIDOPA AND LEVODOPA 25; 100 MG/1; MG/1
2 TABLET ORAL 4 TIMES DAILY
Qty: 720 TABLET | Refills: 1 | Status: CANCELLED | OUTPATIENT
Start: 2022-01-10

## 2022-01-10 NOTE — TELEPHONE ENCOUNTER
Refill request for Sinemet 25/100mg. Pt reports she is taking 2 tabs by mouth 5 times daily instead of four times daily per old order from Dr. Alvares. She was last seen 7/15/21 and is due to see you on 2/8/22. What would you like to do?    Meche Mix RN on 1/10/2022 at 3:55 PM

## 2022-01-11 RX ORDER — CARBIDOPA AND LEVODOPA 25; 100 MG/1; MG/1
2 TABLET ORAL
Qty: 450 TABLET | Refills: 1 | Status: SHIPPED | OUTPATIENT
Start: 2022-01-11 | End: 2022-02-08

## 2022-01-11 NOTE — TELEPHONE ENCOUNTER
Sent Sinemet 25/100mg 2 tabs 5 times daily to pt's pharmacy per verbal order from Dr. Niño.     Meche Mix RN on 1/11/2022 at 8:06 AM

## 2022-01-30 ENCOUNTER — HEALTH MAINTENANCE LETTER (OUTPATIENT)
Age: 62
End: 2022-01-30

## 2022-02-08 ENCOUNTER — OFFICE VISIT (OUTPATIENT)
Dept: NEUROLOGY | Facility: CLINIC | Age: 62
End: 2022-02-08
Payer: COMMERCIAL

## 2022-02-08 VITALS
WEIGHT: 123.2 LBS | HEIGHT: 66 IN | HEART RATE: 76 BPM | DIASTOLIC BLOOD PRESSURE: 71 MMHG | SYSTOLIC BLOOD PRESSURE: 115 MMHG | BODY MASS INDEX: 19.8 KG/M2

## 2022-02-08 DIAGNOSIS — Z79.899 ENCOUNTER FOR LONG-TERM (CURRENT) USE OF MEDICATIONS: ICD-10-CM

## 2022-02-08 DIAGNOSIS — G20.A1 PARKINSON'S DISEASE (H): Primary | ICD-10-CM

## 2022-02-08 PROCEDURE — 99215 OFFICE O/P EST HI 40 MIN: CPT | Performed by: PSYCHIATRY & NEUROLOGY

## 2022-02-08 RX ORDER — CARBIDOPA AND LEVODOPA 25; 100 MG/1; MG/1
2 TABLET ORAL
Qty: 330 TABLET | Refills: 5 | Status: SHIPPED | OUTPATIENT
Start: 2022-02-08 | End: 2022-08-11

## 2022-02-08 RX ORDER — ENTACAPONE 200 MG/1
200 TABLET ORAL
Qty: 450 TABLET | Refills: 3 | Status: SHIPPED | OUTPATIENT
Start: 2022-02-08 | End: 2023-01-30

## 2022-02-08 ASSESSMENT — MIFFLIN-ST. JEOR: SCORE: 1130.58

## 2022-02-08 NOTE — PROGRESS NOTES
INITIAL NEUROLOGY CONSULTATION    DATE OF VISIT: 2/8/2022  MRN: [id number]  PATIENT NAME: Marlene Merida  YOB: 1960    REFERRING PROVIDER: No ref. provider found    Chief Complaint   Patient presents with     Parkinson     (transfer care Dr. Alvares) discuss medication        SUBJECTIVE:                                                      HPI:   Marlene Merida is a 61 year old female here to establish care for Parkinson's disease. She previously followed with Dr. Alvares in this clinic for the same. She initially presented in 2013 with right-sided spasticity.  Brain and cervical spine imaging were unremarkable brain MRI showed mild, nonspecific white matter changes and cervical MRI showed moderate central stenosis at C4-C7, with some foraminal narrowing on the right greater than left at C4-C5.  Notes indicate prior significant problems with hand tremor.    When she saw Dr. Alvares in 7.2021 she was having significant dyskinesias so he instructed her to decrease her carbidopa/levodopa to 1 tablet 5 times daily and continue the entacapone. His note indicates previous problems with the selegiline and pramipexole. Dr. Alvares recommended she see a movement specialist at the [place]. He also referred her to physical therapy in part because she was having trouble with right-sided sciatic pain. She did call into says she was having more motor problems with the change in the carbidopa/levodopa dosing so Dr. Alvares switched her to 2 tablets 4 times daily. It appears that now she is taking it 5 times daily again. She says that trying to wait 6 hours between doses was too long.     She says that the medications seem to be working. She notices occasional wearing off between doses.     She says that her initial symptoms were increased difficulties with walking.     She takes her carbidopa/levodopa: 3:30am, 7:30AM, 11:15AM, 3:15PM and 7:15PM. It kicks in in about 20 minutes. She adjusts the dose if she is  going to be out in public.     She has noticed that her urine is light orange. No burning, urgency or other symptoms of UTI with this.  She notes that just because she also continues have some problems with her back and sometimes wonders if it is related to her kidneys. She says her back does still get sore. She does does a home exercise program. She did not follow-up with physical therapy as previously recommended by Dr. Alvares.  She says that she would like to start vitamin B and D supplements.  She asks if these would interfere with her medications.    She sleeps only 3-6 hours per night. She tries yoga, milk and tea which help. She does not want to take any medications for sleep. She also has had some problems her calves bruising, intermittently. Nothing in recent weeks. She is not on a blood thinner.     She has had some problems with getting her prescriptions to some of the dose changes, but it appears to be corrected now.    Past Medical History:   Diagnosis Date     Parkinson's disease (H)      Past Surgical History:   Procedure Laterality Date     BREAST SURGERY       GYN SURGERY         ibuprofen (ADVIL/MOTRIN) 200 MG tablet, Take 200 mg by mouth as needed for mild pain  pramipexole (MIRAPEX) 0.5 MG tablet, Take 1 tablet (0.5 mg) by mouth 3 times daily (Patient not taking: Reported on 7/15/2021)  selegiline 5 MG tablet, Take 1 tablet (5 mg) by mouth 2 times daily (with meals) (Patient not taking: Reported on 7/15/2021)    No current facility-administered medications on file prior to visit.    No Known Allergies     Problem (# of Occurrences) Relation (Name,Age of Onset)    Alzheimer Disease (1) Mother    Arthritis (1) Father    Diabetes (2) Paternal Grandmother, Brother    Hypertension (2) Mother, Father       Negative family history of: Breast Cancer        Social History     Tobacco Use     Smoking status: Never Smoker     Smokeless tobacco: Never Used   Substance Use Topics     Alcohol use: Yes      "Comment: 1-2 beers a month      Drug use: Never       REVIEW OF SYSTEMS:                                                      10-point review of systems is negative except as mentioned above in HPI.     EXAM:                                                      Physical Exam:   Vitals: /71 (BP Location: Right arm, Patient Position: Sitting)   Pulse 76   Ht 1.676 m (5' 6\")   Wt 55.9 kg (123 lb 3.2 oz)   LMP  (LMP Unknown)   BMI 19.89 kg/m    BMI= Body mass index is 19.89 kg/m .  GENERAL: NAD.  Dyskinesias (especially in RLE).  HEENT: NC/AT.   CV: RRR. S1, S2.   NECK: No bruits.  PULM: Non-labored breathing.   Family is ~2 hours after previous dose of Sinemet.  Neurologic:  MENTAL STATUS: Alert, attentive. Speech is fluent. Normal comprehension.  Normal concentration. Adequate fund of knowledge.   CRANIAL NERVES: Discs flat. Visual fields intact to confrontation. Pupils equally, round and reactive to light. Facial sensation and movement normal. EOM full. Hearing intact to conversation. Trapezius strength intact. Tongue midline.  MOTOR: 5/5 in proximal and distal muscle groups of upper and lower extremities. Tone and bulk normal.   DTRs: Intact and symmetric in biceps, BR, patellae.  Babinski down-going bilaterally.   SENSATION: Normal light touch and pinprick. Intact proprioception at great toes. Vibration: Normal at both ankles.   COORDINATION: Minimal tremor with action otherwise normal finger nose finger. Finger tapping normal. Knee heel shin normal.  STATION AND GAIT: Romberg negative. Good postural reflexes. Casual gait and tandem are normal.  Step length and arm swing appear normal.  Right hand-dominant.    Relevant Data:  Prior CNS images not available for review.    ASSESSMENT and PLAN:                                                      Assessment:     ICD-10-CM    1. Parkinson's disease (H)  G20 Basic metabolic panel     Physical Therapy Referral     carbidopa-levodopa (SINEMET)  MG tablet "     entacapone (COMTAN) 200 MG tablet   2. Encounter for long-term (current) use of medications  Z79.899 Basic metabolic panel        Ms. Merida is a pleasant 61-year-old woman here to establish care for Parkinson's disease.  Based on chart review and the patient's history/exam, she seems to be doing quite well from a motor symptom standpoint.  She does have some dyskinesias which I suspect are due to the moderate dose of Sinemet, but she feels that overall things are generally improved.  She does have some wearing off in between doses which is only problematic if she needs to be active at that time.  We discussed adding an additional half tablet of carbidopa/levodopa for these instances as needed.  Marlene is also motivated to do some physical therapy for both the back and balance.  I have put in a new referral for her.  Because of her concerns regarding her kidneys, I think it would be reasonable to get an updated metabolic panel.  I would like to see Marlene back in clinic in about 6 months.  She expressed understanding and agreement with the plan.    Plan:  -- Lab: Metabolic panel.  We will notify you of the results  -- Continue carbidopa/levodopa: 2 tablets 5 times daily.  Okay to take an additional 1/2 tablet as-needed for wearing off between doses.   -- Continue the entacapone: 1 tablet 5 times daily with the carbidopa/levodopa.  -- Referral for physical therapy.    -- Return to Neurology clinic in 6 months.  Please let us know if any concerns arise in the meantime.    Total Time: 40 minutes were spent with the patient and in chart review/documentation (as described above in Assessment and Plan) /coordinating the care on date of service.    Pat Niño MD  Neurology    CC: Ryne Fu MD    Dragon software used in the dictation of this note.

## 2022-02-08 NOTE — PATIENT INSTRUCTIONS
Plan:  -- Lab: Metabolic panel.  We will notify you of the results  -- Continue carbidopa/levodopa: 2 tablets 5 times daily.  Okay to take an additional 1/2 tablet as-needed for wearing off between doses.   -- Continue the entacapone: 1 tablet 5 times daily with the carbidopa/levodopa.  -- Referral for physical therapy.    -- Return to Neurology clinic in 6 months.  Please let us know if any concerns arise in the meantime.

## 2022-02-08 NOTE — NURSING NOTE
Chief Complaint   Patient presents with     Parkinson     (transfer care Dr. Alvares) discuss medication      Ben Heard CMA@ on 2/8/2022 at 12:39 PM

## 2022-02-08 NOTE — LETTER
2/8/2022         RE: Marlene Merida  [address]  [place] [address]        Dear Colleague,    Thank you for referring your patient, Marlene Merida, to the [facility] NEUROLOGY [facility]IC [place]. Please see a copy of my visit note below.    INITIAL NEUROLOGY CONSULTATION    DATE OF VISIT: 2/8/2022  MRN: [id number]  PATIENT NAME: Marlene Merida  YOB: 1960    REFERRING PROVIDER: No ref. provider found    Chief Complaint   Patient presents with     Parkinson     (transfer care Dr. Alvares) discuss medication        SUBJECTIVE:                                                      HPI:   Marlene Merida is a 61 year old female here to establish care for Parkinson's disease. She previously followed with Dr. Alvares in this clinic for the same. She initially presented in 2013 with right-sided spasticity.  Brain and cervical spine imaging were unremarkable brain MRI showed mild, nonspecific white matter changes and cervical MRI showed moderate central stenosis at C4-C7, with some foraminal narrowing on the right greater than left at C4-C5.  Notes indicate prior significant problems with hand tremor.    When she saw Dr. Alvares in 7.2021 she was having significant dyskinesias so he instructed her to decrease her carbidopa/levodopa to 1 tablet 5 times daily and continue the entacapone. His note indicates previous problems with the selegiline and pramipexole. Dr. Alvares recommended she see a movement specialist at the [facility]. He also referred her to physical therapy in part because she was having trouble with right-sided sciatic pain. She did call into says she was having more motor problems with the change in the carbidopa/levodopa dosing so Dr. Alvares switched her to 2 tablets 4 times daily. It appears that now she is taking it 5 times daily again. She says that trying to wait 6 hours between doses was too long.     She says that the medications seem to be working. She notices  occasional wearing off between doses.     She says that her initial symptoms were increased difficulties with walking.     She takes her carbidopa/levodopa: 3:30am, 7:30AM, 11:15AM, 3:15PM and 7:15PM. It kicks in in about 20 minutes. She adjusts the dose if she is going to be out in public.     She has noticed that her urine is light orange. No burning, urgency or other symptoms of UTI with this.  She notes that just because she also continues have some problems with her back and sometimes wonders if it is related to her kidneys. She says her back does still get sore. She does does a home exercise program. She did not follow-up with physical therapy as previously recommended by Dr. Alvares.  She says that she would like to start vitamin B and D supplements.  She asks if these would interfere with her medications.    She sleeps only 3-6 hours per night. She tries yoga, milk and tea which help. She does not want to take any medications for sleep. She also has had some problems her calves bruising, intermittently. Nothing in recent weeks. She is not on a blood thinner.     She has had some problems with getting her prescriptions to some of the dose changes, but it appears to be corrected now.    Past Medical History:   Diagnosis Date     Parkinson's disease (H)      Past Surgical History:   Procedure Laterality Date     BREAST SURGERY       GYN SURGERY         ibuprofen (ADVIL/MOTRIN) 200 MG tablet, Take 200 mg by mouth as needed for mild pain  pramipexole (MIRAPEX) 0.5 MG tablet, Take 1 tablet (0.5 mg) by mouth 3 times daily (Patient not taking: Reported on 7/15/2021)  selegiline 5 MG tablet, Take 1 tablet (5 mg) by mouth 2 times daily (with meals) (Patient not taking: Reported on 7/15/2021)    No current facility-administered medications on file prior to visit.    No Known Allergies     Problem (# of Occurrences) Relation (Name,Age of Onset)    Alzheimer Disease (1) Mother    Arthritis (1) Father    Diabetes (2)  "Paternal Grandmother, Brother    Hypertension (2) Mother, Father       Negative family history of: Breast Cancer        Social History     Tobacco Use     Smoking status: Never Smoker     Smokeless tobacco: Never Used   Substance Use Topics     Alcohol use: Yes     Comment: 1-2 beers a month      Drug use: Never       REVIEW OF SYSTEMS:                                                      10-point review of systems is negative except as mentioned above in HPI.     EXAM:                                                      Physical Exam:   Vitals: /71 (BP Location: Right arm, Patient Position: Sitting)   Pulse 76   Ht 1.676 m (5' 6\")   Wt 55.9 kg (123 lb 3.2 oz)   LMP  (LMP Unknown)   BMI 19.89 kg/m    BMI= Body mass index is 19.89 kg/m .  GENERAL: NAD.  Dyskinesias (especially in RLE).  HEENT: NC/AT.   CV: RRR. S1, S2.   NECK: No bruits.  PULM: Non-labored breathing.   Family is ~2 hours after previous dose of Sinemet.  Neurologic:  MENTAL STATUS: Alert, attentive. Speech is fluent. Normal comprehension.  Normal concentration. Adequate fund of knowledge.   CRANIAL NERVES: Discs flat. Visual fields intact to confrontation. Pupils equally, round and reactive to light. Facial sensation and movement normal. EOM full. Hearing intact to conversation. Trapezius strength intact. Tongue midline.  MOTOR: 5/5 in proximal and distal muscle groups of upper and lower extremities. Tone and bulk normal.   DTRs: Intact and symmetric in biceps, BR, patellae.  Babinski down-going bilaterally.   SENSATION: Normal light touch and pinprick. Intact proprioception at great toes. Vibration: Normal at both ankles.   COORDINATION: Minimal tremor with action otherwise normal finger nose finger. Finger tapping normal. Knee heel shin normal.  STATION AND GAIT: Romberg negative. Good postural reflexes. Casual gait and tandem are normal.  Step length and arm swing appear normal.  Right hand-dominant.    Relevant Data:  Prior CNS images " not available for review.    ASSESSMENT and PLAN:                                                      Assessment:     ICD-10-CM    1. Parkinson's disease (H)  G20 Basic metabolic panel     Physical Therapy Referral     carbidopa-levodopa (SINEMET)  MG tablet     entacapone (COMTAN) 200 MG tablet   2. Encounter for long-term (current) use of medications  Z79.899 Basic metabolic panel        Ms. Merida is a pleasant 61-year-old woman here to establish care for Parkinson's disease.  Based on chart review and the patient's history/exam, she seems to be doing quite well from a motor symptom standpoint.  She does have some dyskinesias which I suspect are due to the moderate dose of Sinemet, but she feels that overall things are generally improved.  She does have some wearing off in between doses which is only problematic if she needs to be active at that time.  We discussed adding an additional half tablet of carbidopa/levodopa for these instances as needed.  Marlene is also motivated to do some physical therapy for both the back and balance.  I have put in a new referral for her.  Because of her concerns regarding her kidneys, I think it would be reasonable to get an updated metabolic panel.  I would like to see Marlene back in clinic in about 6 months.  She expressed understanding and agreement with the plan.    Plan:  -- Lab: Metabolic panel.  We will notify you of the results  -- Continue carbidopa/levodopa: 2 tablets 5 times daily.  Okay to take an additional 1/2 tablet as-needed for wearing off between doses.   -- Continue the entacapone: 1 tablet 5 times daily with the carbidopa/levodopa.  -- Referral for physical therapy.    -- Return to Neurology clinic in 6 months.  Please let us know if any concerns arise in the meantime.    Total Time: 40 minutes were spent with the patient and in chart review/documentation (as described above in Assessment and Plan) /coordinating the care on date of service.    Pat  Bridger Niño MD  Neurology    CC: Ryne Fu MD    Dragon software used in the dictation of this note.        Again, thank you for allowing me to participate in the care of your patient.        Sincerely,        Pat Niño MD

## 2022-08-08 ENCOUNTER — LAB (OUTPATIENT)
Dept: LAB | Facility: HOSPITAL | Age: 62
End: 2022-08-08
Payer: COMMERCIAL

## 2022-08-08 ENCOUNTER — OFFICE VISIT (OUTPATIENT)
Dept: NEUROLOGY | Facility: CLINIC | Age: 62
End: 2022-08-08
Payer: COMMERCIAL

## 2022-08-08 VITALS
HEART RATE: 85 BPM | SYSTOLIC BLOOD PRESSURE: 81 MMHG | BODY MASS INDEX: 17.29 KG/M2 | DIASTOLIC BLOOD PRESSURE: 54 MMHG | HEIGHT: 66 IN | WEIGHT: 107.6 LBS

## 2022-08-08 DIAGNOSIS — Z79.899 ENCOUNTER FOR LONG-TERM (CURRENT) USE OF MEDICATIONS: ICD-10-CM

## 2022-08-08 DIAGNOSIS — M54.50 LUMBAR PAIN: ICD-10-CM

## 2022-08-08 DIAGNOSIS — G20.A1 PARKINSON'S DISEASE (H): Primary | ICD-10-CM

## 2022-08-08 DIAGNOSIS — G20.A1 PARKINSON'S DISEASE (H): ICD-10-CM

## 2022-08-08 LAB
ANION GAP SERPL CALCULATED.3IONS-SCNC: 7 MMOL/L (ref 5–18)
BUN SERPL-MCNC: 21 MG/DL (ref 8–22)
CALCIUM SERPL-MCNC: 8.8 MG/DL (ref 8.5–10.5)
CHLORIDE BLD-SCNC: 108 MMOL/L (ref 98–107)
CO2 SERPL-SCNC: 26 MMOL/L (ref 22–31)
CREAT SERPL-MCNC: 0.7 MG/DL (ref 0.6–1.1)
GFR SERPL CREATININE-BSD FRML MDRD: >90 ML/MIN/1.73M2
GLUCOSE BLD-MCNC: 98 MG/DL (ref 70–125)
POTASSIUM BLD-SCNC: 4 MMOL/L (ref 3.5–5)
SODIUM SERPL-SCNC: 141 MMOL/L (ref 136–145)

## 2022-08-08 PROCEDURE — 36415 COLL VENOUS BLD VENIPUNCTURE: CPT

## 2022-08-08 PROCEDURE — 99214 OFFICE O/P EST MOD 30 MIN: CPT | Performed by: PSYCHIATRY & NEUROLOGY

## 2022-08-08 PROCEDURE — 82310 ASSAY OF CALCIUM: CPT

## 2022-08-08 NOTE — PATIENT INSTRUCTIONS
Plan:  -- Lab: Metabolic panel.  We will notify you of the results  -- Continue carbidopa/levodopa: 2 tablets 5 times daily.  Okay to take an additional 1/2 tablet as-needed for wearing off between doses.   -- Continue the entacapone: 1 tablet 5 times daily with the carbidopa/levodopa.  -- Referral for physical therapy for your lower back.    -- Return to Neurology clinic in 6 months.  Please let us know if any concerns arise in the meantime.

## 2022-08-08 NOTE — NURSING NOTE
Chief Complaint   Patient presents with     Parkinson     Follow up      Ben Heard CMA@ on 8/8/2022 at 8:00 AM

## 2022-08-08 NOTE — LETTER
8/8/2022         RE: Marlene Merida  [address]  [place] [address]        Dear Colleague,    Thank you for referring your patient, Marlene Merida, to the [facility] NEUROLOGY CLINIC [place]. Please see a copy of my visit note below.    ESTABLISHED PATIENT NEUROLOGY NOTE    DATE OF VISIT: 8/8/2022  MRN: [id number]  PATIENT NAME: Marlene Merida  YOB: 1960    Chief Complaint   Patient presents with     Parkinson     Follow up      SUBJECTIVE:                                                      HISTORY OF PRESENT ILLNESS:  Marlene is here for follow up regarding Parkinson's Disease.    History as previously documented by me (2.8.22):  She previously followed with Dr. Alvares in this clinic for the same. She initially presented in 2013 with right-sided spasticity.  Brain and cervical spine imaging were unremarkable brain MRI showed mild, nonspecific white matter changes and cervical MRI showed moderate central stenosis at C4-C7, with some foraminal narrowing on the right greater than left at C4-C5.  Notes indicate prior significant problems with hand tremor.     When she saw Dr. Alvares in 7.2021 she was having significant dyskinesias so he instructed her to decrease her carbidopa/levodopa to 1 tablet 5 times daily and continue the entacapone. His note indicates previous problems with the selegiline and pramipexole. Dr. Alvares recommended she see a movement specialist at the [facility]. He also referred her to physical therapy in part because she was having trouble with right-sided sciatic pain. She did call into says she was having more motor problems with the change in the carbidopa/levodopa dosing so Dr. Alvares switched her to 2 tablets 4 times daily. It appears that now she is taking it 5 times daily again. She says that trying to wait 6 hours between doses was too long.      She says that the medications seem to be working. She notices occasional wearing off between  doses.      She says that her initial symptoms were increased difficulties with walking.      She takes her carbidopa/levodopa: 3:30am, 7:30AM, 11:15AM, 3:15PM and 7:15PM. It kicks in in about 20 minutes. She adjusts the dose if she is going to be out in public.      She has noticed that her urine is light orange. No burning, urgency or other symptoms of UTI with this.  She notes that just because she also continues have some problems with her back and sometimes wonders if it is related to her kidneys. She says her back does still get sore. She does does a home exercise program. She did not follow-up with physical therapy as previously recommended by Dr. Alvares.  She says that she would like to start vitamin B and D supplements.  She asks if these would interfere with her medications.     She sleeps only 3-6 hours per night. She tries yoga, milk and tea which help. She does not want to take any medications for sleep. She also has had some problems her calves bruising, intermittently. Nothing in recent weeks. She is not on a blood thinner.      She has had some problems with getting her prescriptions to some of the dose changes, but it appears to be corrected now.    I ordered a metabolic panel but this has not been drawn.  We did not make any changes in her carbidopa/levodopa dosing, though I did tell Marlene that it would be okay to take an additional half tablet as needed for wearing off between doses.  We continued the entacapone.  I also referred Marlene to physical therapy.  I do not see any PT notes in her chart.    Marlene tells me that she has been overdoing it with babysitting her granddaughter and picking her up. She takes aspirin as needed for back pain.  She mentions that she has both cervical and lumbar issues.  She bruises easily, and mentions she is clumsy. She admits she needs more exercise. She would like to do some physical therapy for her back.  Energy level is good.  She says she will be looking for a  "part-time job to keep busy.  No problems with dizziness or falls.  No visual changes.  No localized weakness.    She says the current schedule of carbidopa/levodopa is adequate. She does take an extra half tablet if she feels that she is wearing off.  She does this about 3 days per week.  She continues the entacapone.  No complaints about side effects on her medications.    CURRENT MEDICATIONS:   ASPIRIN PO, 2 tablets if needed  carbidopa-levodopa (SINEMET)  MG tablet, Take 2 tablets by mouth 5 times daily And an additional 1/2 tablet as needed for symptoms  entacapone (COMTAN) 200 MG tablet, Take 1 tablet (200 mg) by mouth 5 times daily Take with each dose of Sinemet.  ibuprofen (ADVIL/MOTRIN) 200 MG tablet, Take 200 mg by mouth as needed for mild pain  pramipexole (MIRAPEX) 0.5 MG tablet, Take 1 tablet (0.5 mg) by mouth 3 times daily (Patient not taking: No sig reported)  selegiline 5 MG tablet, Take 1 tablet (5 mg) by mouth 2 times daily (with meals) (Patient not taking: No sig reported)    No current facility-administered medications on file prior to visit.      RECENT DIAGNOSTIC STUDIES:   Labs: No results found for any visits on 08/08/22.      REVIEW OF SYSTEMS:                                                      10-point review of systems is negative except as mentioned above in HPI.    EXAM:                                                      Physical Exam:   Vitals: BP (!) 81/54 (BP Location: Right arm, Patient Position: Sitting)   Pulse 85   Ht 1.676 m (5' 6\")   Wt 48.8 kg (107 lb 9.6 oz)   LMP  (LMP Unknown)   BMI 17.37 kg/m    BMI= Body mass index is 17.37 kg/m .  GENERAL: NAD.  Near-constant dyskinesias.  HEENT: NC/AT.  CV: RRR. S1, S2.   NECK: No bruits.  PULM: Non-labored breathing.   Exam is ~1 hour after previous dose of Sinemet.  Neurologic:  MENTAL STATUS: Alert, attentive. Speech is fluent. Normal comprehension.  Normal concentration. Adequate fund of knowledge.   CRANIAL NERVES: " Discs flat. Visual fields intact to confrontation. Pupils equally, round and reactive to light. Facial sensation and movement normal. EOM full. Hearing intact to conversation. Trapezius strength intact. Tongue midline.  MOTOR: 5/5 in proximal and distal muscle groups of upper and lower extremities. Tone and bulk normal.   DTRs: Intact and symmetric in biceps, BR, patellae.  Babinski down-going bilaterally.   SENSATION: Normal light touch and pinprick. Intact proprioception at great toes. Vibration: Normal at both ankles.   COORDINATION: Minimal tremor with action otherwise normal finger nose finger.  Knee heel shin normal.  STATION AND GAIT: Romberg negative. Good postural reflexes. Casual gait and tandem are normal.  Step length and arm swing appear normal.  Right hand-dominant.    ASSESSMENT and PLAN:                                                      Assessment:    ICD-10-CM    1. Parkinson's disease (H)  G20    2. Lumbar pain  M54.50 Physical Therapy Referral   3. Encounter for long-term (current) use of medications  Z79.899         Ms. Merida is a pleasant 62-year-old woman here for follow-up regarding Parkinson's disease.  She feels that overall she is doing well from a motor standpoint.  She does continue to have dyskinesias but feels that the current doses of carbidopa/levodopa are appropriate.  She does have some concerns about lower back issues with recent possible minor injuries so I am referring her to physical therapy for this.  We were previously going to have her do some therapy for balance as well.  No changes in medications for the time being.  We will check a BMP for medication monitoring purposes.  I would like to see Marlene burch in clinic in 6 months.  She understands and agrees with the plan.    Plan:  -- Lab: Metabolic panel.  We will notify you of the results  -- Continue carbidopa/levodopa: 2 tablets 5 times daily.  Okay to take an additional 1/2 tablet as-needed for wearing off between  doses.   -- Continue the entacapone: 1 tablet 5 times daily with the carbidopa/levodopa.  -- Referral for physical therapy for your lower back.    -- Return to Neurology clinic in 6 months.  Please let us know if any concerns arise in the meantime.    Total Time: 30 minutes were spent with the patient and in chart review/documentation (as described above in Assessment and Plan)/coordinating the care on date of service.    Pat Niño MD  Neurology    Dragon software used in the dictation of this note.                        Again, thank you for allowing me to participate in the care of your patient.        Sincerely,        Pat Niño MD

## 2022-08-08 NOTE — PROGRESS NOTES
ESTABLISHED PATIENT NEUROLOGY NOTE    DATE OF VISIT: 8/8/2022  MRN: [id number]  PATIENT NAME: Marlene Merida  YOB: 1960    Chief Complaint   Patient presents with     Parkinson     Follow up      SUBJECTIVE:                                                      HISTORY OF PRESENT ILLNESS:  Marlene is here for follow up regarding Parkinson's Disease.    History as previously documented by me (2.8.22):  She previously followed with Dr. Alvares in this clinic for the same. She initially presented in 2013 with right-sided spasticity.  Brain and cervical spine imaging were unremarkable brain MRI showed mild, nonspecific white matter changes and cervical MRI showed moderate central stenosis at C4-C7, with some foraminal narrowing on the right greater than left at C4-C5.  Notes indicate prior significant problems with hand tremor.     When she saw Dr. Alvares in 7.2021 she was having significant dyskinesias so he instructed her to decrease her carbidopa/levodopa to 1 tablet 5 times daily and continue the entacapone. His note indicates previous problems with the selegiline and pramipexole. Dr. Alvares recommended she see a movement specialist at the [facility]. He also referred her to physical therapy in part because she was having trouble with right-sided sciatic pain. She did call into says she was having more motor problems with the change in the carbidopa/levodopa dosing so Dr. Alvares switched her to 2 tablets 4 times daily. It appears that now she is taking it 5 times daily again. She says that trying to wait 6 hours between doses was too long.      She says that the medications seem to be working. She notices occasional wearing off between doses.      She says that her initial symptoms were increased difficulties with walking.      She takes her carbidopa/levodopa: 3:30am, 7:30AM, 11:15AM, 3:15PM and 7:15PM. It kicks in in about 20 minutes. She adjusts the dose if she is going to be out in public.       She has noticed that her urine is light orange. No burning, urgency or other symptoms of UTI with this.  She notes that just because she also continues have some problems with her back and sometimes wonders if it is related to her kidneys. She says her back does still get sore. She does does a home exercise program. She did not follow-up with physical therapy as previously recommended by Dr. Alvares.  She says that she would like to start vitamin B and D supplements.  She asks if these would interfere with her medications.     She sleeps only 3-6 hours per night. She tries yoga, milk and tea which help. She does not want to take any medications for sleep. She also has had some problems her calves bruising, intermittently. Nothing in recent weeks. She is not on a blood thinner.      She has had some problems with getting her prescriptions to some of the dose changes, but it appears to be corrected now.    I ordered a metabolic panel but this has not been drawn.  We did not make any changes in her carbidopa/levodopa dosing, though I did tell Marlene that it would be okay to take an additional half tablet as needed for wearing off between doses.  We continued the entacapone.  I also referred Marlene to physical therapy.  I do not see any PT notes in her chart.    Marlene tells me that she has been overdoing it with babysitting her granddaughter and picking her up. She takes aspirin as needed for back pain.  She mentions that she has both cervical and lumbar issues.  She bruises easily, and mentions she is clumsy. She admits she needs more exercise. She would like to do some physical therapy for her back.  Energy level is good.  She says she will be looking for a part-time job to keep busy.  No problems with dizziness or falls.  No visual changes.  No localized weakness.    She says the current schedule of carbidopa/levodopa is adequate. She does take an extra half tablet if she feels that she is wearing off.  She does  "this about 3 days per week.  She continues the entacapone.  No complaints about side effects on her medications.    CURRENT MEDICATIONS:   ASPIRIN PO, 2 tablets if needed  carbidopa-levodopa (SINEMET)  MG tablet, Take 2 tablets by mouth 5 times daily And an additional 1/2 tablet as needed for symptoms  entacapone (COMTAN) 200 MG tablet, Take 1 tablet (200 mg) by mouth 5 times daily Take with each dose of Sinemet.  ibuprofen (ADVIL/MOTRIN) 200 MG tablet, Take 200 mg by mouth as needed for mild pain  pramipexole (MIRAPEX) 0.5 MG tablet, Take 1 tablet (0.5 mg) by mouth 3 times daily (Patient not taking: No sig reported)  selegiline 5 MG tablet, Take 1 tablet (5 mg) by mouth 2 times daily (with meals) (Patient not taking: No sig reported)    No current facility-administered medications on file prior to visit.      RECENT DIAGNOSTIC STUDIES:   Labs: No results found for any visits on 08/08/22.      REVIEW OF SYSTEMS:                                                      10-point review of systems is negative except as mentioned above in HPI.    EXAM:                                                      Physical Exam:   Vitals: BP (!) 81/54 (BP Location: Right arm, Patient Position: Sitting)   Pulse 85   Ht 1.676 m (5' 6\")   Wt 48.8 kg (107 lb 9.6 oz)   LMP  (LMP Unknown)   BMI 17.37 kg/m    BMI= Body mass index is 17.37 kg/m .  GENERAL: NAD.  Near-constant dyskinesias.  HEENT: NC/AT.  CV: RRR. S1, S2.   NECK: No bruits.  PULM: Non-labored breathing.   Exam is ~1 hour after previous dose of Sinemet.  Neurologic:  MENTAL STATUS: Alert, attentive. Speech is fluent. Normal comprehension.  Normal concentration. Adequate fund of knowledge.   CRANIAL NERVES: Discs flat. Visual fields intact to confrontation. Pupils equally, round and reactive to light. Facial sensation and movement normal. EOM full. Hearing intact to conversation. Trapezius strength intact. Tongue midline.  MOTOR: 5/5 in proximal and distal muscle " groups of upper and lower extremities. Tone and bulk normal.   DTRs: Intact and symmetric in biceps, BR, patellae.  Babinski down-going bilaterally.   SENSATION: Normal light touch and pinprick. Intact proprioception at great toes. Vibration: Normal at both ankles.   COORDINATION: Minimal tremor with action otherwise normal finger nose finger.  Knee heel shin normal.  STATION AND GAIT: Romberg negative. Good postural reflexes. Casual gait and tandem are normal.  Step length and arm swing appear normal.  Right hand-dominant.    ASSESSMENT and PLAN:                                                      Assessment:    ICD-10-CM    1. Parkinson's disease (H)  G20    2. Lumbar pain  M54.50 Physical Therapy Referral   3. Encounter for long-term (current) use of medications  Z79.899         Ms. Merida is a pleasant 62-year-old woman here for follow-up regarding Parkinson's disease.  She feels that overall she is doing well from a motor standpoint.  She does continue to have dyskinesias but feels that the current doses of carbidopa/levodopa are appropriate.  She does have some concerns about lower back issues with recent possible minor injuries so I am referring her to physical therapy for this.  We were previously going to have her do some therapy for balance as well.  No changes in medications for the time being.  We will check a BMP for medication monitoring purposes.  I would like to see Marlene burch in clinic in 6 months.  She understands and agrees with the plan.    Plan:  -- Lab: Metabolic panel.  We will notify you of the results  -- Continue carbidopa/levodopa: 2 tablets 5 times daily.  Okay to take an additional 1/2 tablet as-needed for wearing off between doses.   -- Continue the entacapone: 1 tablet 5 times daily with the carbidopa/levodopa.  -- Referral for physical therapy for your lower back.    -- Return to Neurology clinic in 6 months.  Please let us know if any concerns arise in the meantime.    Total Time: 30  minutes were spent with the patient and in chart review/documentation (as described above in Assessment and Plan)/coordinating the care on date of service.    Pat Niño MD  Neurology    Dragon software used in the dictation of this note.

## 2022-08-09 ENCOUNTER — TELEPHONE (OUTPATIENT)
Dept: NEUROLOGY | Facility: CLINIC | Age: 62
End: 2022-08-09

## 2022-08-09 NOTE — TELEPHONE ENCOUNTER
Patient was given results as per Dr. Niño. Patient verbalized understanding and no further questions.     Ben Heard CMA@ on 8/9/2022 at 2:55 PM

## 2022-08-09 NOTE — TELEPHONE ENCOUNTER
----- Message from Pat Niño MD sent at 8/9/2022  2:07 PM CDT -----  Please let Marlene know that her metabolic panel is normal/unremarkable.    Thank you,  Dr. Niño

## 2022-08-11 DIAGNOSIS — G20.A1 PARKINSON'S DISEASE (H): ICD-10-CM

## 2022-08-11 RX ORDER — CARBIDOPA AND LEVODOPA 25; 100 MG/1; MG/1
2 TABLET ORAL
Qty: 330 TABLET | Refills: 5 | Status: SHIPPED | OUTPATIENT
Start: 2022-08-11 | End: 2023-01-30

## 2022-08-11 NOTE — TELEPHONE ENCOUNTER
Refill request for carbidopa-levodopa 25/100.  Last seen 8/8/22. Upcoming appt on 02/02/23.  Medication T'd for review and signature    Brenda Gomez LPN on 8/11/2022 at 11:18 AM

## 2022-09-24 ENCOUNTER — HEALTH MAINTENANCE LETTER (OUTPATIENT)
Age: 62
End: 2022-09-24

## 2022-11-08 ENCOUNTER — HOSPITAL ENCOUNTER (EMERGENCY)
Facility: HOSPITAL | Age: 62
Discharge: HOME | End: 2022-11-08
Admitting: PHYSICIAN ASSISTANT
Payer: COMMERCIAL

## 2022-11-08 VITALS
OXYGEN SATURATION: 97 % | RESPIRATION RATE: 18 BRPM | HEART RATE: 88 BPM | WEIGHT: 110 LBS | TEMPERATURE: 98.8 F | SYSTOLIC BLOOD PRESSURE: 108 MMHG | HEIGHT: 66 IN | DIASTOLIC BLOOD PRESSURE: 57 MMHG | BODY MASS INDEX: 17.68 KG/M2

## 2022-11-08 DIAGNOSIS — S61.412A LACERATION OF LEFT HAND WITHOUT FOREIGN BODY, INITIAL ENCOUNTER: ICD-10-CM

## 2022-11-08 PROCEDURE — 99282 EMERGENCY DEPT VISIT SF MDM: CPT

## 2022-11-08 PROCEDURE — 12001 RPR S/N/AX/GEN/TRNK 2.5CM/<: CPT

## 2022-11-08 PROCEDURE — 12002 RPR S/N/AX/GEN/TRNK2.6-7.5CM: CPT

## 2022-11-08 ASSESSMENT — ENCOUNTER SYMPTOMS
WOUND: 1
WEAKNESS: 0
NUMBNESS: 0

## 2022-11-08 NOTE — ED PROVIDER NOTES
EMERGENCY DEPARTMENT ENCOUNTER      NAME: Marlene Merida  AGE: 62 year old female  YOB: 1960  MRN: [id number]  EVALUATION DATE & TIME: No admission date for patient encounter.    PCP: Ryne Fu    ED PROVIDER: Comfort Rain PA-C      Chief Complaint   Patient presents with     Laceration         FINAL IMPRESSION:  1. Laceration of left hand without foreign body, initial encounter          MEDICAL DECISION MAKING:    Pertinent Labs & Imaging studies reviewed. 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     Vitals reviewed and unremarkable. Patient well appearing and in no acute distress. On exam, 2 cm superficial laceration to dorsum of left hand. No active bleeding. No visualized tendon, bone or muscle. No visualized foreign bodies. Full ROM of all fingers. Distal sensation intact. Brisk capillary refill to all fingers.     Discussed options for closure and risk/benefit of each. Patient would prefer skin adhesive. Wound was irrigated and closed using dermabond. Discussed ongoing wound care and return precautions. Patient discharged home in stable condition.     Medical Decision Making    Supplemental history from: N/A    External Record(s) Reviewed: N/A    Differential Diagnosis: See MDM charting for differential considered.     I performed an independent interpretation of the: N/A    Discussed with radiology regarding test interpretation: N/A    Discussion of management with another provider: See chart documentation, if applicable and See ED Course    The following testing was considered but ultimately not selected: None    I considered prescription management with: N/A    The patient's care impacted: None    Consideration of Admission/Observation: N/A - Patient discharged without consideration for admission    Care significantly affected by  Social Determinants of Health including: N/A    0 minutes of critical care time     ED COURSE:  11:31 AM I met with the patient, obtained history, performed an initial exam, and discussed options and plan for diagnostics and treatment here in the ED.  11:44 AM Patient discharged after being provided with extensive anticipatory guidance and given return precautions, importance of PCP follow-up emphasized.    At the conclusion of the encounter I discussed the results of all of the tests and the disposition. The questions were answered. The patient acknowledged understanding and was agreeable with the care plan.     MEDICATIONS GIVEN IN THE EMERGENCY:  Medications - No data to display    NEW PRESCRIPTIONS STARTED AT TODAY'S ER VISIT  Discharge Medication List as of 11/8/2022 11:40 AM               =================================================================    HPI:    Patient information was obtained from: Patient    Use of Interpretor: N/A      Marlene Merida is a 62 year old female with a pertinent history of parkinson's disease, who presents to this ED by walk-in for evaluation of a laceration.    Patient reports she was slicing cheese at work earlier today when she accidentally sliced the dorsum of her left hand. Patient is not actively bleeding. Denies numbness, tingling or weakness. States she is not anticoagulated and her tetanus is up to date. Denies any other medical complaint at this time.     REVIEW OF SYSTEMS:  Review of Systems   Skin: Positive for wound.   Neurological: Negative for weakness and numbness.   Hematological: Does not bruise/bleed easily.   All other systems reviewed and are negative.         PAST MEDICAL HISTORY:  Past Medical History:   Diagnosis Date     Parkinson's disease (H)        PAST SURGICAL HISTORY:  Past Surgical History:   Procedure Laterality Date     BREAST SURGERY       GYN SURGERY             CURRENT MEDICATIONS:    No current facility-administered medications for this  "encounter.    Current Outpatient Medications:      ASPIRIN PO, 2 tablets if needed, Disp: , Rfl:      carbidopa-levodopa (SINEMET)  MG tablet, Take 2 tablets by mouth 5 times daily And an additional 1/2 tablet as needed for symptoms, Disp: 330 tablet, Rfl: 5     entacapone (COMTAN) 200 MG tablet, Take 1 tablet (200 mg) by mouth 5 times daily Take with each dose of Sinemet., Disp: 450 tablet, Rfl: 3     ibuprofen (ADVIL/MOTRIN) 200 MG tablet, Take 200 mg by mouth as needed for mild pain, Disp: , Rfl:      pramipexole (MIRAPEX) 0.5 MG tablet, Take 1 tablet (0.5 mg) by mouth 3 times daily (Patient not taking: No sig reported), Disp: 90 tablet, Rfl: 6     selegiline 5 MG tablet, Take 1 tablet (5 mg) by mouth 2 times daily (with meals) (Patient not taking: No sig reported), Disp: 180 tablet, Rfl: 3      ALLERGIES:  No Known Allergies    FAMILY HISTORY:  Family History   Problem Relation Age of Onset     Alzheimer Disease Mother      Hypertension Mother      Hypertension Father      Arthritis Father      Diabetes Paternal Grandmother      Diabetes Brother      Breast Cancer No family hx of        SOCIAL HISTORY:   Social History     Socioeconomic History     Marital status:    Tobacco Use     Smoking status: Never     Smokeless tobacco: Never   Substance and Sexual Activity     Alcohol use: Yes     Comment: 1-2 beers a month      Drug use: Never     Sexual activity: Not Currently   Other Topics Concern     Parent/sibling w/ CABG, MI or angioplasty before 65F 55M? No       VITALS:  Patient Vitals for the past 24 hrs:   BP Temp Temp src Pulse Resp SpO2 Height Weight   11/08/22 1055 108/57 98.8  F (37.1  C) Temporal 88 18 97 % 1.676 m (5' 6\") 49.9 kg (110 lb)       PHYSICAL EXAM  General: Appears in no acute distress, awake, alert, interactive.  Eyes: Conjunctivae non-injected. Sclera anicteric.  HENT: Atraumatic.  Neck: Supple.  Respiratory/Chest: Respiration unlabored.  Abdomen: non " distended  Musculoskeletal: Normal extremities. No edema or erythema.  Skin: Normal color. No rash or diaphoresis. 2 cm superficial laceration to dorsum of left hand. No active bleeding. No visualized tendon, bone or muscle. No visualized foreign bodies. Full ROM of all fingers. Distal sensation intact. Brisk capillary refill to all fingers.   Neurologic: Face symmetric, moves all extremities spontaneously. Speech clear.  Psychiatric: Oriented to person, place, and time. Affect appropriate.        PROCEDURES:   PROCEDURE: Laceration Repair   INDICATIONS: Laceration   PROCEDURE PROVIDER: Comfort Rani PA-C   SITE: Dorsum of left hand   TYPE/SIZE: simple, clean and no foreign body visualized  2 cm (total length)   FUNCTIONAL ASSESSMENT: Distal sensation, circulation, motor and tendon function intact   MEDICATION: None   PREPARATION: scrubbing and irrigation with Normal saline and Hibiclens   DEBRIDEMENT: no debridement and wound explored, no foreign body found   CLOSURE:  Superficial layer closed with Dermabond (medical glue)    Total number of sutures/staples placed: 0       I, Hi Mayer, am serving as a scribe to document services personally performed by Comfort Rain PA-C based on my observation and the provider's statements to me. IComfort PA-C attest that Hi Mayer is acting in a scribe capacity, has observed my performance of the services and has documented them in accordance with my direction.    Comfort Rain PA-C  Emergency Medicine  [facility]  11/8/2022       Comfort aRin PA-C  11/16/22 [id number]

## 2022-11-08 NOTE — ED TRIAGE NOTES
amb to triage.  Pt states about 0930 cut L hand with knife at work. Bleeding controlled PTA.  Last tetanus about 5 yrs ago.      Triage Assessment     Row Name 11/08/22 1057       Triage Assessment (Adult)    Airway WDL WDL       Respiratory WDL    Respiratory WDL WDL       Skin Circulation/Temperature WDL    Skin Circulation/Temperature WDL WDL       Cardiac WDL    Cardiac WDL WDL       Peripheral/Neurovascular WDL    Peripheral Neurovascular WDL WDL       Cognitive/Neuro/Behavioral WDL    Cognitive/Neuro/Behavioral WDL WDL

## 2022-11-08 NOTE — ED NOTES
"ED Triage Provider Note  [facility] EMERGENCY DEPARTMENT  Encounter Date: Nov 8, 2022    History:  Chief Complaint   Patient presents with     Laceration     Marlene Merida is a 62 year old female who presents to the ED with simple hand lac.  Left hand.  Dorsum.  Shallow.  Glue versus a few simple interupted.  Tetanus UTD      Exam:  /57   Pulse 88   Temp 98.8  F (37.1  C) (Temporal)   Resp 18   Ht 1.676 m (5' 6\")   Wt 49.9 kg (110 lb)   LMP  (LMP Unknown)   SpO2 97%   BMI 17.75 kg/m    General: No acute distress. Appears stated age.   Cardio: normal rate, extremities well perfused  Resp: Normal work of breathing, grossly normal respiratory rate  Neuro: Alert. Facial movement grossly symmetric. Grossly intact strength.   Simple hand laceration 2 cm    Medical Decision Making:  Patient arriving to the ED with problem as above. A medical screening exam was performed. Initial differential diagnosis includes but not limited to hand lac.    no orders initiated from Triage. The patient is most appropriate to tetanus UTD      Job Burnette MD  11/8/2022 at 10:59 AM     Job Burnette MD  11/08/22 1101    "

## 2022-11-16 ASSESSMENT — ENCOUNTER SYMPTOMS: BRUISES/BLEEDS EASILY: 0

## 2023-01-27 DIAGNOSIS — G20.A1 PARKINSON'S DISEASE (H): ICD-10-CM

## 2023-01-30 RX ORDER — CARBIDOPA AND LEVODOPA 25; 100 MG/1; MG/1
2 TABLET ORAL
Qty: 330 TABLET | Refills: 0 | Status: SHIPPED | OUTPATIENT
Start: 2023-01-30 | End: 2023-02-02

## 2023-01-30 RX ORDER — ENTACAPONE 200 MG/1
200 TABLET ORAL
Qty: 450 TABLET | Refills: 0 | Status: SHIPPED | OUTPATIENT
Start: 2023-01-30 | End: 2023-02-02

## 2023-01-30 NOTE — TELEPHONE ENCOUNTER
Refill request for: carbidopa-levodopa 25-100mg   Directions: Take 2 tablets by mouth 5 times daily And an additional 1/2 tablet as needed for symptoms     Refill request for: entacapone 200mg   Directions: Take 1 tablet (200 mg) by mouth 5 times daily Take with each dose of Sinemet    LOV: 08/22/22  NOV: 02/02/23    30 day supply with 0 refills Medication T'd for review and signature    Brenda Gomez LPN on 1/30/2023 at 11:43 AM

## 2023-01-30 NOTE — TELEPHONE ENCOUNTER
[facility] Call Center    Phone Message    May a detailed message be left on voicemail: yes     Reason for Call: Medication Refill Request    Has the patient contacted the pharmacy for the refill? Yes   Name of medication being requested:     carbidopa-levodopa (SINEMET)  MG tablet  AND  entacapone (COMTAN) 200 MG tablet    Provider who prescribed the medication: Dr. Bridger Niño  Pharmacy: [id number] [place], [place] - [address]  Date medication is needed: ASAP    Pt called into pharmacy a few days agor and has not yet heard back. Pt only has a could pills left. And needs today.    Action Taken: Message routed to:  Other: [facility]    Travel Screening: Not Applicable

## 2023-02-01 NOTE — TELEPHONE ENCOUNTER
[facility] Call Center    Phone Message    May a detailed message be left on voicemail: yes     Reason for Call: Other: Pt reports that pharmacy is advising her that it is too early to fill these medications.  Pt state she was advised by provider that it was okay to occasionally break pills in half and karan additional dosage and now is out of these medications and pharmacy needs ok from clinic to fill early. Pt is completely out of one of the medications and is very anxious about getting it so she has her dose for tomorrow morning.    Please call pharmacy to advise early fill is ok or call pt to discuss options.    Action Taken: Message routed to:  Other: [facility]    Travel Screening: Not Applicable

## 2023-02-02 ENCOUNTER — OFFICE VISIT (OUTPATIENT)
Dept: NEUROLOGY | Facility: CLINIC | Age: 63
End: 2023-02-02
Payer: COMMERCIAL

## 2023-02-02 VITALS
SYSTOLIC BLOOD PRESSURE: 129 MMHG | HEART RATE: 92 BPM | WEIGHT: 109.4 LBS | DIASTOLIC BLOOD PRESSURE: 79 MMHG | BODY MASS INDEX: 17.66 KG/M2

## 2023-02-02 DIAGNOSIS — M54.16 LUMBAR RADICULOPATHY: ICD-10-CM

## 2023-02-02 DIAGNOSIS — G20.A1 PARKINSON'S DISEASE (H): Primary | ICD-10-CM

## 2023-02-02 DIAGNOSIS — Z79.899 ENCOUNTER FOR LONG-TERM (CURRENT) USE OF MEDICATIONS: ICD-10-CM

## 2023-02-02 PROCEDURE — 99214 OFFICE O/P EST MOD 30 MIN: CPT | Performed by: PSYCHIATRY & NEUROLOGY

## 2023-02-02 RX ORDER — ENTACAPONE 200 MG/1
200 TABLET ORAL
Qty: 480 TABLET | Refills: 1 | Status: SHIPPED | OUTPATIENT
Start: 2023-02-02 | End: 2023-07-11

## 2023-02-02 RX ORDER — CARBIDOPA AND LEVODOPA 25; 100 MG/1; MG/1
2 TABLET ORAL
Qty: 330 TABLET | Refills: 3 | Status: SHIPPED | OUTPATIENT
Start: 2023-02-02 | End: 2023-06-14

## 2023-02-02 NOTE — TELEPHONE ENCOUNTER
LDM informing pt that I have made two attempts to reach pharmacy and have been unable to reach anyone.     I asked her to return call if unable to  medications today.     They have filled sinemet and are working on getting override from insurance to fill entacapone. Should be able to fill by tomorrow. Pt has been notified that sinemet rx is ready per pharmacist.     [place] RN, BSN  [facility]

## 2023-02-02 NOTE — LETTER
2/2/2023         RE: Marlene Merida  [address]  [place] [address]        Dear Colleague,    Thank you for referring your patient, Marlene Merida, to the [facility] [place]. Please see a copy of my visit note below.    ESTABLISHED PATIENT NEUROLOGY NOTE    DATE OF VISIT: 2/2/2023  MRN: [id number]  PATIENT NAME: Marlene Merida  YOB: 1960    Chief Complaint   Patient presents with     Parkinson     6 mo follow-up   Medication refill request; took her last med at 4:30am  Carbidopa-levodopa and entacapone out     SUBJECTIVE:                                                      HISTORY OF PRESENT ILLNESS:  Marlene is here for follow up regarding Parkinson's disease.  I inherited the patient from Dr. Alvares.  She was initially diagnosed in 2013 with some right-sided spasticity and history of tremor.  There have been some issues with significant dyskinesias on carbidopa/levodopa so Dr. Alvares decrease her dose in July of 2021 to 1 tablet 5 times daily, along with entacapone.  She had been on selegiline and pramipexole in the past but had side effects with these.  There was recommendation that she see a movement specialist at the [place] [facility], but it does not look like this ever happened.  He also referred her to physical therapy for some right-sided sciatic pain and motor issues.  Subsequently they changed her carbidopa/levodopa dosing to 2 tablets 4 times daily.  When I met her, she was back on 5 times daily dosing.  She told me at that time that 6 hours between doses was too long.  She reported some occasional wearing off.  I again referred Marlene to PT but she did not do this.  When we met 6 months ago she said that she had been overdoing it with caring for her granddaughter in terms of her lumbar spine and cervical spine issues.  She was motivated to do PT at that time.  She was taking 1/2 tablet of the carbidopa/levodopa in between doses for wearing off, 3 times  per week on average.  She felt that overall her motor symptoms were under good control.    Marlene is here alone. She is having more back pain, sore to the touch at times. She takes the 1/2 tablet 2-3 times per week.   She says that for the most part her motor symptoms have been under control as long as she takes her medication on time.  She is swimming and walking. She says that she stiffens up and gets more shaky when she is late on her dosing.  She has put off her physical therapy, but is motivated to try this going forward.   She is working at [employer], on her feet for 8 hours per day.   She says her hair is thinning.   She asks about multi-vitamin.    CURRENT MEDICATIONS:   ASPIRIN PO, 2 tablets if needed  carbidopa-levodopa (SINEMET)  MG tablet, Take 2 tablets by mouth 5 times daily And an additional 1/2 tablet as needed for symptoms  entacapone (COMTAN) 200 MG tablet, Take 1 tablet (200 mg) by mouth 5 times daily Take with each dose of Sinemet.  ibuprofen (ADVIL/MOTRIN) 200 MG tablet, Take 200 mg by mouth as needed for mild pain  pramipexole (MIRAPEX) 0.5 MG tablet, Take 1 tablet (0.5 mg) by mouth 3 times daily (Patient not taking: Reported on 7/15/2021)  selegiline 5 MG tablet, Take 1 tablet (5 mg) by mouth 2 times daily (with meals) (Patient not taking: Reported on 7/15/2021)    No current facility-administered medications on file prior to visit.      RECENT DIAGNOSTIC STUDIES:       REVIEW OF SYSTEMS:                                                      10-point review of systems is negative except as mentioned above in HPI.    EXAM:                                                      Physical Exam:   Vitals: /79   Pulse 92   Wt 49.6 kg (109 lb 6.4 oz)   LMP  (LMP Unknown)   BMI 17.66 kg/m    BMI= Body mass index is 17.66 kg/m .  GENERAL: NAD.  HEENT: NC/AT.  CV: RRR. S1, S2.   NECK: No bruits.  PULM: Non-labored breathing.   Neurologic:  MENTAL STATUS: Alert, attentive. Speech is fluent.  Normal comprehension.  Normal concentration. Adequate fund of knowledge.   CRANIAL NERVES: Discs flat. Visual fields intact to confrontation. Pupils equally, round and reactive to light. Facial sensation and movement normal. EOM full. Hearing intact to conversation. Trapezius strength intact. Tongue midline.  MOTOR: 5/5 in proximal and distal muscle groups of upper and lower extremities. Tone and bulk normal.   DTRs: Intact and symmetric in biceps, BR, patellae.  Babinski down-going bilaterally.   SENSATION: Normal light touch and pinprick. Intact proprioception at great toes. Vibration: Normal at both ankles.   COORDINATION: Minimal tremor with action otherwise normal finger nose finger.  Knee heel shin normal.  STATION AND GAIT: Romberg negative. Good postural reflexes. Casual gait and tandem are normal.  Step length and arm swing appear normal.  Right hand-dominant.    ASSESSMENT and PLAN:                                                      Assessment:    ICD-10-CM    1. Parkinson's disease (H)  G20 Physical Therapy Referral      2. Encounter for long-term (current) use of medications  Z79.899       3. Lumbar radiculopathy  M54.16 Physical Therapy Referral           Ms. Merida is a pleasant 62-year-old woman here for follow-up regarding Parkinson's disease.  She is having trouble getting refills of her carbidopa/levodopa, though it looks like the prescription is written for an adequate amount.  She is also in need of refills of her entacapone.  Orders were placed couple days ago.  We learned that Marlene is taking an extra entacapone with the extra half tab of the carbidopa/levodopa when she needs it so she has run out.  We will fix this from a pharmacy standpoint.  I will also put in a new referral for PT.  I would like to see Marlene back in clinic again in about 6 months.  She understands and agrees with the plan.    Plan:  -- Continue carbidopa/levodopa: 2 tablets 5 times daily.  Okay to take an additional 1/2  tablet as-needed for wearing off between doses.   -- Continue the entacapone: 1 tablet 5 times daily with the carbidopa/levodopa (+ extra if needed).  -- New referral for physical therapy for your lower back.    -- Return to Neurology clinic in 6 months.  Please let us know if any concerns arise in the meantime.    Total Time: 30 minutes were spent with the patient and in chart review/documentation (as described above in Assessment and Plan)/coordinating the care on date of service.    Pat Niño MD  Neurology    Dragon software used in the dictation of this note.                        Again, thank you for allowing me to participate in the care of your patient.        Sincerely,        Pat Niño MD

## 2023-02-02 NOTE — PROGRESS NOTES
ESTABLISHED PATIENT NEUROLOGY NOTE    DATE OF VISIT: 2/2/2023  MRN: [id number]  PATIENT NAME: Marlene Merida  YOB: 1960    Chief Complaint   Patient presents with     Parkinson     6 mo follow-up   Medication refill request; took her last med at 4:30am  Carbidopa-levodopa and entacapone out     SUBJECTIVE:                                                      HISTORY OF PRESENT ILLNESS:  Marlene is here for follow up regarding Parkinson's disease.  I inherited the patient from Dr. Alvares.  She was initially diagnosed in 2013 with some right-sided spasticity and history of tremor.  There have been some issues with significant dyskinesias on carbidopa/levodopa so Dr. Alvares decrease her dose in July of 2021 to 1 tablet 5 times daily, along with entacapone.  She had been on selegiline and pramipexole in the past but had side effects with these.  There was recommendation that she see a movement specialist at the [facility], but it does not look like this ever happened.  He also referred her to physical therapy for some right-sided sciatic pain and motor issues.  Subsequently they changed her carbidopa/levodopa dosing to 2 tablets 4 times daily.  When I met her, she was back on 5 times daily dosing.  She told me at that time that 6 hours between doses was too long.  She reported some occasional wearing off.  I again referred Marlene to PT but she did not do this.  When we met 6 months ago she said that she had been overdoing it with caring for her granddaughter in terms of her lumbar spine and cervical spine issues.  She was motivated to do PT at that time.  She was taking 1/2 tablet of the carbidopa/levodopa in between doses for wearing off, 3 times per week on average.  She felt that overall her motor symptoms were under good control.    Marlene is here alone. She is having more back pain, sore to the touch at times. She takes the 1/2 tablet 2-3 times per week.   She says that for the most part her motor  symptoms have been under control as long as she takes her medication on time.  She is swimming and walking. She says that she stiffens up and gets more shaky when she is late on her dosing.  She has put off her physical therapy, but is motivated to try this going forward.   She is working at [employer], on her feet for 8 hours per day.   She says her hair is thinning.   She asks about multi-vitamin.    CURRENT MEDICATIONS:   ASPIRIN PO, 2 tablets if needed  carbidopa-levodopa (SINEMET)  MG tablet, Take 2 tablets by mouth 5 times daily And an additional 1/2 tablet as needed for symptoms  entacapone (COMTAN) 200 MG tablet, Take 1 tablet (200 mg) by mouth 5 times daily Take with each dose of Sinemet.  ibuprofen (ADVIL/MOTRIN) 200 MG tablet, Take 200 mg by mouth as needed for mild pain  pramipexole (MIRAPEX) 0.5 MG tablet, Take 1 tablet (0.5 mg) by mouth 3 times daily (Patient not taking: Reported on 7/15/2021)  selegiline 5 MG tablet, Take 1 tablet (5 mg) by mouth 2 times daily (with meals) (Patient not taking: Reported on 7/15/2021)    No current facility-administered medications on file prior to visit.      RECENT DIAGNOSTIC STUDIES:       REVIEW OF SYSTEMS:                                                      10-point review of systems is negative except as mentioned above in HPI.    EXAM:                                                      Physical Exam:   Vitals: /79   Pulse 92   Wt 49.6 kg (109 lb 6.4 oz)   LMP  (LMP Unknown)   BMI 17.66 kg/m    BMI= Body mass index is 17.66 kg/m .  GENERAL: NAD.  HEENT: NC/AT.  CV: RRR. S1, S2.   NECK: No bruits.  PULM: Non-labored breathing.   Neurologic:  MENTAL STATUS: Alert, attentive. Speech is fluent. Normal comprehension.  Normal concentration. Adequate fund of knowledge.   CRANIAL NERVES: Discs flat. Visual fields intact to confrontation. Pupils equally, round and reactive to light. Facial sensation and movement normal. EOM full. Hearing intact to  conversation. Trapezius strength intact. Tongue midline.  MOTOR: 5/5 in proximal and distal muscle groups of upper and lower extremities. Tone and bulk normal.   DTRs: Intact and symmetric in biceps, BR, patellae.  Babinski down-going bilaterally.   SENSATION: Normal light touch and pinprick. Intact proprioception at great toes. Vibration: Normal at both ankles.   COORDINATION: Minimal tremor with action otherwise normal finger nose finger.  Knee heel shin normal.  STATION AND GAIT: Romberg negative. Good postural reflexes. Casual gait and tandem are normal.  Step length and arm swing appear normal.  Right hand-dominant.    ASSESSMENT and PLAN:                                                      Assessment:    ICD-10-CM    1. Parkinson's disease (H)  G20 Physical Therapy Referral      2. Encounter for long-term (current) use of medications  Z79.899       3. Lumbar radiculopathy  M54.16 Physical Therapy Referral           Ms. Merida is a pleasant 62-year-old woman here for follow-up regarding Parkinson's disease.  She is having trouble getting refills of her carbidopa/levodopa, though it looks like the prescription is written for an adequate amount.  She is also in need of refills of her entacapone.  Orders were placed couple days ago.  We learned that Marlene is taking an extra entacapone with the extra half tab of the carbidopa/levodopa when she needs it so she has run out.  We will fix this from a pharmacy standpoint.  I will also put in a new referral for PT.  I would like to see Marlene back in clinic again in about 6 months.  She understands and agrees with the plan.    Plan:  -- Continue carbidopa/levodopa: 2 tablets 5 times daily.  Okay to take an additional 1/2 tablet as-needed for wearing off between doses.   -- Continue the entacapone: 1 tablet 5 times daily with the carbidopa/levodopa (+ extra if needed).  -- New referral for physical therapy for your lower back.    -- Return to Neurology clinic in 6 months.   Please let us know if any concerns arise in the meantime.    Total Time: 30 minutes were spent with the patient and in chart review/documentation (as described above in Assessment and Plan)/coordinating the care on date of service.    Pat Niño MD  Neurology    Dragon software used in the dictation of this note.

## 2023-02-02 NOTE — NURSING NOTE
"Marlene Merida is a 62 year old female who presents for:  Chief Complaint   Patient presents with     Parkinson     6 mo follow-up   Medication refill request; took her last med at 4:30am  Carbidopa-levodopa and entacapone out        Initial Vitals:  /79   Pulse 92   Wt 49.6 kg (109 lb 6.4 oz)   LMP  (LMP Unknown)   BMI 17.66 kg/m   Estimated body mass index is 17.66 kg/m  as calculated from the following:    Height as of 11/8/22: 1.676 m (5' 6\").    Weight as of this encounter: 49.6 kg (109 lb 6.4 oz).. Body surface area is 1.52 meters squared. BP completed using cuff size: wrist cuff    Ben Hess  "

## 2023-02-02 NOTE — PATIENT INSTRUCTIONS
Plan:  -- Continue carbidopa/levodopa: 2 tablets 5 times daily.  Okay to take an additional 1/2 tablet as-needed for wearing off between doses.   -- Continue the entacapone: 1 tablet 5 times daily with the carbidopa/levodopa (+ extra if needed).  -- New referral for physical therapy for your lower back.    -- Return to Neurology clinic in 6 months.  Please let us know if any concerns arise in the meantime.

## 2023-03-07 ENCOUNTER — HOSPITAL ENCOUNTER (OUTPATIENT)
Dept: PHYSICAL THERAPY | Facility: REHABILITATION | Age: 63
Discharge: HOME | End: 2023-03-07
Payer: MEDICARE

## 2023-03-07 DIAGNOSIS — G20.A1 PARKINSON'S DISEASE (H): ICD-10-CM

## 2023-03-07 DIAGNOSIS — M54.16 LUMBAR RADICULOPATHY: ICD-10-CM

## 2023-03-07 PROCEDURE — 97535 SELF CARE MNGMENT TRAINING: CPT | Mod: GP | Performed by: PHYSICAL THERAPIST

## 2023-03-07 PROCEDURE — 97110 THERAPEUTIC EXERCISES: CPT | Mod: GP | Performed by: PHYSICAL THERAPIST

## 2023-03-07 PROCEDURE — 97161 PT EVAL LOW COMPLEX 20 MIN: CPT | Mod: GP | Performed by: PHYSICAL THERAPIST

## 2023-03-07 NOTE — PROGRESS NOTES
[facility]    OUTPATIENT PHYSICAL THERAPY ORTHOPEDIC EVALUATION  PLAN OF TREATMENT FOR OUTPATIENT REHABILITATION  (COMPLETE FOR INITIAL CLAIMS ONLY)  Patient's Last Name, First Name, M.I.  YOB: 1960  Magnolia[place]arlene  [facility]    Provider s Name:  [facility]   Medical Record No.  [id number]   Start of Care Date:  03/07/23   Onset Date:  09/07/22   Type:     _X__PT   ___OT   ___SLP Medical Diagnosis:  Lumbar pain (M54.50)     PT Diagnosis:  Chronic back pain with Parkinson sx   Visits from SOC:  1      _________________________________________________________________________________  Plan of Treatment/Functional Goals:  ADL retraining, balance training, bed mobility training, gait training, joint mobilization, manual therapy, motor coordination training, neuromuscular re-education, ROM, strengthening, stretching, transfer training           Goals     Goal Description: Pt will be able to finish a work shift without increase in back pain for improved QOL in 90 days.  Target Date: 06/04/23       Goal Description: Pt will be able bend over for ADLs and work duties without increase in back pain or difficulty for improved quality of movement in 90 days.  Target Date: 06/04/23       Goal Description: Pt will be able to demo WNL gait mechanics with improved LE strength for improved spinal support with weight bearing exercise in 90 days.  Target Date: 06/04/23             Therapy Frequency:  2 times/Week  Predicted Duration of Therapy Intervention:  90 days    Melita Rodriguez PT, DPT, CLT                 I CERTIFY THE NEED FOR THESE SERVICES FURNISHED UNDER        THIS PLAN OF TREATMENT AND WHILE UNDER MY CARE     (Physician co-signature of this document indicates review and certification of the therapy plan).                     Certification Date From:  03/07/23   Certification Date To:  " 06/04/23    Referring Provider:  Dr. Mario Niño    Initial Assessment        See Epic Evaluation Start of Care Date: 03/07/23 03/07/23 0900   General Information   Type of Visit Initial OP Ortho PT Evaluation   Start of Care Date 03/07/23   Referring Physician Dr. Mario Niño   Patient/Family Goals Statement less restriction, more strength, more comfortable mobility   Orders Evaluate and Treat   Date of Order 08/08/22   Certification Required? 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 Pt reports having 5 kids with 2 csections.   Body Part(s)   Body Part(s) Lumbar Spine/SI   Presentation and Etiology   Pertinent history of current problem (include personal factors and/or comorbidities that impact the POC) Pt reports she started to have some back pain and felt like she was the \"tin man\" when she was trying to walk she felt very robotic with her movement. Pt went to MD to assess sx and was told that she had sx that could be related to Parkinson's, restless leg sx and back pain. Pt started medication for his Parkinson's sx around that time and that was about 6 years ago. Pt was doing okay with just working with MD on medication dosage for a while but pt feels like she has started to have more increased back pain maybe about 6 months ago that she can feel when she stands up in bilateral SIJ area. Pt will take ibuprofen and that usually can give really good relief. Pt also tries to eat well and do some leg stretches and some leg and arm stretching and that feels good but pt is looking for more strengthening exercise programming to help with her sx. Pt does notice that when her medications are starting to wear off she can feel that it gets harder to walk and to write - slows " down until she can take her meds again. Pt reports she can also get concerned about her balance when her medications wear off. Pt also can feel some increased back pain when she tries to lift over 25 lbs. Pt can sometimes get a little numbness into her toes when she walkes up in the morning but maybe within 10 minutes with walking around and moving her feet and massaging her feet the tingling/numbness resolves. Pt also reports that she only gets about 3 hours of sleep in a row - has been like that for maybe 30 years. Pt also wants to ride bikes and camp this summer and is motivated to get stronger.   Impairments A. Pain;D. Decreased ROM;E. Decreased flexibility;F. Decreased strength and endurance;G. Impaired balance;H. Impaired gait   Functional Limitations perform activities of daily living;perform required work activities;perform desired leisure / sports activities   Symptom Location B SIJ   How/Where did it occur From insidious onset   Onset date of current episode/exacerbation 09/07/22   Chronicity Chronic   Pain rating (0-10 point scale) Other   Pain rating comment 1-10/10   Pain quality A. Sharp   Frequency of pain/symptoms B. Intermittent   Fall Risk Screen   Fall screen completed by PT   Have you fallen 2 or more times in the past year? No   Have you fallen and had an injury in the past year? No   Is patient a fall risk? No   Abuse Screen (yes response referral indicated)   Feels Unsafe at Home or Work/School no   Feels Threatened by Someone no   Does Anyone Try to Keep You From Having Contact with Others or Doing Things Outside Your Home? no   Physical Signs of Abuse Present no   Functional Scales   Functional Scales Other   Other Scales  PENELOPE 6%   Lumbar Spine/SI Objective Findings   Gait/Locomotion Chorea type movements with lateral path deviations, unequal step length and B trendelenberg   Balance/Proprioception (Single Leg Stance) APTA sit to stand 11 reps in 30 seconds (<13 risk for falls)   Hamstring  Flexibility B significant tightness   Hip Flexor Flexibility B min limited   Flexion ROM Mod limited with fingertips to mid shin with pulling across low back and SIJ   Extension ROM Min limited with good stretch felt low back   Right Side Bending ROM WNL with good stretch   Left Side Bending ROM WNL with good stretch   Hip Flexion (L2) Strength R hip flexor 4/5, L hip flexor 4+/5 - pulling sensation into upper quads   Knee Extension (L3) Strength B quad 5/5   Ankle Dorsiflexion (L4) Strength B 5/5   Great Toe Extension (L5) Strength Hard for pt to maintain toe ext with heel walking   Slump Test Negative B LE   Observation Pt demo's chorea-like movements throughout subjective assessment   Planned Therapy Interventions   Planned Therapy Interventions ADL retraining;balance training;bed mobility training;gait training;joint mobilization;manual therapy;motor coordination training;neuromuscular re-education;ROM;strengthening;stretching;transfer training   Clinical Impression   Criteria for Skilled Therapeutic Interventions Met yes, treatment indicated   PT Diagnosis Chronic back pain with Parkinson sx   Clinical Presentation Stable/Uncomplicated   Clinical Decision Making (Complexity) Low complexity   Therapy Frequency 2 times/Week   Predicted Duration of Therapy Intervention (days/wks) 90 days   Risk & Benefits of therapy have been explained Yes   Patient, Family & other staff in agreement with plan of care Yes   Clinical Impression Comments Pt demo's signs and sx consistent with chronic intermittent low back pain with decreased trunk ROM and trunk and LE weakness and tightness as well as chorea type movements and reported slowness of coordination with Parkison's sx contributing to abnormal gait and decreased balance placing pt at risk for falls. Pt is good candidate for skilled PT services as outlined to address impairments and reach goals.   ORTHO GOALS   PT Ortho Eval Goals 1;2;3   Ortho Goal 1   Goal Description Pt  will be able to finish a work shift without increase in back pain for improved QOL in 90 days.   Target Date 06/04/23   Ortho Goal 2   Goal Description Pt will be able bend over for ADLs and work duties without increase in back pain or difficulty for improved quality of movement in 90 days.   Target Date 06/04/23   Ortho Goal 3   Goal Description Pt will be able to demo WNL gait mechanics with improved LE strength for improved spinal support with weight bearing exercise in 90 days.   Target Date 06/04/23   Total Evaluation Time   PT Eval, Low Complexity Minutes (64369) 20   Therapy Certification   Certification date from 03/07/23   Certification date to 06/04/23   Medical Diagnosis Lumbar pain (M54.50)

## 2023-03-07 NOTE — DISCHARGE INSTRUCTIONS
Sleep strategies    NO TV or music and no bright lights from 11:00 pm to 4:00 am  Try for a low level dim night light so not pitch black in bedroom  Try for white noise or water noise to help with quietness of no tv    Exercise - start with this one - try to do 10-13 reps in a row 2x/day - try to do in front of a mirror so you can watch your knees - try to keep knees apart from each other to make hips stronger

## 2023-03-20 ENCOUNTER — TRANSFERRED RECORDS (OUTPATIENT)
Dept: MULTI SPECIALTY CLINIC | Facility: CLINIC | Age: 63
End: 2023-03-20

## 2023-03-23 ENCOUNTER — HOSPITAL ENCOUNTER (OUTPATIENT)
Dept: PHYSICAL THERAPY | Facility: REHABILITATION | Age: 63
Discharge: HOME | End: 2023-03-23
Payer: MEDICARE

## 2023-03-23 DIAGNOSIS — G20.A1 PARKINSON'S DISEASE (H): Primary | ICD-10-CM

## 2023-03-23 DIAGNOSIS — M54.16 LUMBAR RADICULOPATHY: ICD-10-CM

## 2023-03-23 PROCEDURE — 97535 SELF CARE MNGMENT TRAINING: CPT | Mod: GP | Performed by: PHYSICAL THERAPIST

## 2023-03-23 PROCEDURE — 97116 GAIT TRAINING THERAPY: CPT | Mod: GP | Performed by: PHYSICAL THERAPIST

## 2023-03-23 PROCEDURE — 97110 THERAPEUTIC EXERCISES: CPT | Mod: GP | Performed by: PHYSICAL THERAPIST

## 2023-03-23 NOTE — DISCHARGE INSTRUCTIONS
Sleep strategies  Trying to keep lights off when going to the bathroom in the middle of the night and having water on her night stand to decrease movement during the night and to try not to turn on any electronics.      X10-15 reps each leg 1-2 sets 1-2x/day

## 2023-03-23 NOTE — PROGRESS NOTES
TWO- MINUTE WALK TEST (2mwt) MEANS     MEAN DISTANCE (FEET) METERS       + cm   18-54 600.4 183 m 0.192  cm   55-59 578.7 176 m 38.776 cm   60-64 545.9 166 m 32.936 cm   65-69 509.2 155 m 20.416 cm   70-74 478.7 145 m 95.9576 cm   75-79 462.3 140 m 90.904 cm   80-85 440.6 136 m 29.488 cm         MEN   MEAN DISTANCE (FEET) METERS     + cm   18-54 659.1 200 m 89.368 cm   55-59 626.6 190 m 98.768 cm   60-64 587.6 179 m 10.048 cm   65-69 604.3 184 m 19.064 cm   70-74 565.6 172 m 39.488 cm   75-79 517.1 157 m 61.208 cm   80-85 472.7 144 m 7.896 cm             R.W Janice et al (2014)  [facility] Amber Wadsworth PT, CLT-CORNELIUS

## 2023-04-10 ENCOUNTER — HOSPITAL ENCOUNTER (OUTPATIENT)
Dept: PHYSICAL THERAPY | Facility: REHABILITATION | Age: 63
Discharge: HOME | End: 2023-04-10
Payer: MEDICARE

## 2023-04-10 DIAGNOSIS — M54.16 LUMBAR RADICULOPATHY: ICD-10-CM

## 2023-04-10 DIAGNOSIS — G20.A1 PARKINSON'S DISEASE (H): Primary | ICD-10-CM

## 2023-04-10 PROCEDURE — 97535 SELF CARE MNGMENT TRAINING: CPT | Mod: GP | Performed by: PHYSICAL THERAPIST

## 2023-04-10 PROCEDURE — 97110 THERAPEUTIC EXERCISES: CPT | Mod: GP | Performed by: PHYSICAL THERAPIST

## 2023-04-10 NOTE — DISCHARGE INSTRUCTIONS
KEEP up with sit to stand - watch knees - try to keep them apart on the way up and the way back down to the chair1    Keep up with backwards kick!    ADD sideways kick    X10-15 reps 1-2 sets 1-3x/day - trying to kick sideways and a little backwards - feeling the outside of the hip get worked!

## 2023-05-08 ENCOUNTER — HEALTH MAINTENANCE LETTER (OUTPATIENT)
Age: 63
End: 2023-05-08

## 2023-06-14 DIAGNOSIS — G20.A1 PARKINSON'S DISEASE (H): ICD-10-CM

## 2023-06-14 RX ORDER — CARBIDOPA AND LEVODOPA 25; 100 MG/1; MG/1
2 TABLET ORAL
Qty: 330 TABLET | Refills: 1 | Status: SHIPPED | OUTPATIENT
Start: 2023-06-14 | End: 2023-08-08

## 2023-06-14 NOTE — TELEPHONE ENCOUNTER
Refill request for: carbidopa-levodopa 25-100mg   Directions: Take 2 tablets by mouth 5 times daily And an additional 1/2 tablet as needed for symptoms     LOV: 02/02/23  NOV: 08/08/23    Pt of Dr. Niño. She is out of the office until 6/19/23. Can you refill in her absence?    30 day supply with 1 refills Medication T'd for review and signature    Brenda Gomez LPN on 6/14/2023 at 2:30 PM

## 2023-07-10 ENCOUNTER — TELEPHONE (OUTPATIENT)
Dept: NEUROLOGY | Facility: CLINIC | Age: 63
End: 2023-07-10
Payer: MEDICARE

## 2023-07-10 NOTE — TELEPHONE ENCOUNTER
Refill request for: entacapone 200mg   Pt has enough medication to last through upcoming appt on 8/8/23:    entacapone (COMTAN) 200 MG tablet 480 tablet 1 2/2/2023  No   Sig - Route: Take 1 tablet (200 mg) by mouth 5 times daily Take with each dose of Sinemet. Ok to take additional tablet with as-needed extra carbi/levo dose - Oral   Sent to pharmacy as: Entacapone 200 MG Oral Tablet (COMTAN)   Class: E-Prescribe   Order: [id number]   E-Prescribing Status: Receipt confirmed by pharmacy (2/2/2023  9:07 AM CST     Brenda Gomez LPN on 7/10/2023 at 11:25 AM

## 2023-07-11 DIAGNOSIS — G20.A1 PARKINSON'S DISEASE (H): ICD-10-CM

## 2023-07-11 NOTE — TELEPHONE ENCOUNTER
[facility] Call Center    Phone Message    May a detailed message be left on voicemail: yes     Reason for Call: Medication Question or concern regarding medication   Prescription Clarification  Name of Medication: entacapone (COMTAN) 200 MG tablet  Prescribing Provider: Pat Leyva   Pharmacy: N/A   What on the order needs clarification?     Pt is requesting a call back to get an update on the refill for this medication.     Please call pt back to advise at # [phone].          Action Taken: Message routed to:  Other: [facility]     Travel Screening: Not Applicable

## 2023-07-11 NOTE — TELEPHONE ENCOUNTER
Refill request for: entacapone (COMTAN) 200 MG tablet  Directions: Take 1 tablet (200 mg) by mouth 5 times daily Take with each dose of Sinemet. Ok to take additional tablet with as-needed extra carbi/levo dose    LOV: 2/2/23  NOV: 8/8/23    90 day supply with 0 refills Medication T'd for review and signature  Hayley Light CMA on 7/11/2023 at 3:56 PM

## 2023-07-12 RX ORDER — ENTACAPONE 200 MG/1
200 TABLET ORAL
Qty: 450 TABLET | Refills: 0 | Status: SHIPPED | OUTPATIENT
Start: 2023-07-12 | End: 2023-08-08

## 2023-07-12 NOTE — TELEPHONE ENCOUNTER
Patient informed rx was sent to her pharmacy ([facility]) at 9:33am today  Hayley Light CMA on 7/12/2023 at 2:11 PM

## 2023-07-12 NOTE — TELEPHONE ENCOUNTER
[facility] Call Center    Phone Message    May a detailed message be left on voicemail: yes     Reason for Call:  Patient called would like to know if refill has been sent to her pharmacy yet? Patient will be out by tomorrow    Action Taken: Other: [facility]    Travel Screening: Not Applicable

## 2023-08-08 ENCOUNTER — OFFICE VISIT (OUTPATIENT)
Dept: NEUROLOGY | Facility: CLINIC | Age: 63
End: 2023-08-08
Payer: MEDICARE

## 2023-08-08 ENCOUNTER — LAB (OUTPATIENT)
Dept: LAB | Facility: HOSPITAL | Age: 63
End: 2023-08-08
Payer: MEDICARE

## 2023-08-08 VITALS — SYSTOLIC BLOOD PRESSURE: 99 MMHG | HEART RATE: 77 BPM | DIASTOLIC BLOOD PRESSURE: 60 MMHG

## 2023-08-08 DIAGNOSIS — E61.1 IRON DEFICIENCY: ICD-10-CM

## 2023-08-08 DIAGNOSIS — Z79.899 ENCOUNTER FOR LONG-TERM (CURRENT) USE OF MEDICATIONS: ICD-10-CM

## 2023-08-08 DIAGNOSIS — G25.81 RESTLESS LEGS SYNDROME (RLS): ICD-10-CM

## 2023-08-08 DIAGNOSIS — M79.9 SOFT TISSUE LESION OF FOOT: ICD-10-CM

## 2023-08-08 DIAGNOSIS — G20.A1 PARKINSON'S DISEASE (H): Primary | ICD-10-CM

## 2023-08-08 LAB — FERRITIN SERPL-MCNC: 50 NG/ML (ref 11–328)

## 2023-08-08 PROCEDURE — 99214 OFFICE O/P EST MOD 30 MIN: CPT | Performed by: PSYCHIATRY & NEUROLOGY

## 2023-08-08 PROCEDURE — 82728 ASSAY OF FERRITIN: CPT

## 2023-08-08 PROCEDURE — 36415 COLL VENOUS BLD VENIPUNCTURE: CPT

## 2023-08-08 RX ORDER — ENTACAPONE 200 MG/1
200 TABLET ORAL
Qty: 450 TABLET | Refills: 1 | Status: SHIPPED | OUTPATIENT
Start: 2023-08-08 | End: 2024-02-13

## 2023-08-08 RX ORDER — CARBIDOPA AND LEVODOPA 25; 100 MG/1; MG/1
2 TABLET ORAL
Qty: 330 TABLET | Refills: 6 | Status: SHIPPED | OUTPATIENT
Start: 2023-08-08 | End: 2024-02-13

## 2023-08-08 NOTE — LETTER
8/8/2023         RE: Marlene Merida  [address]  [place] [address]        Dear Colleague,    Thank you for referring your patient, Marlene Merida, to the [facility] [place]. Please see a copy of my visit note below.    ESTABLISHED PATIENT NEUROLOGY NOTE    DATE OF VISIT: 8/8/2023  MRN: [id number]  PATIENT NAME: Marlene Merida  YOB: 1960    Chief Complaint   Patient presents with     Parkinson     6 mo follow-up   Possible wart on her foot  Wondering if medications can cause other medical issues   Sleeping pattern is off  Still having back pain; went for PT, was beneficial but didn't complete the session     SUBJECTIVE:                                                      HISTORY OF PRESENT ILLNESS:  Marlene is here for follow up regarding Parkinson's disease.    History as previously documented by me (2.2.23):   I inherited the patient from Dr. Alvares.  She was initially diagnosed in 2013 with some right-sided spasticity and history of tremor.  There have been some issues with significant dyskinesias on carbidopa/levodopa so Dr. Alvares decrease her dose in July of 2021 to 1 tablet 5 times daily, along with entacapone.  She had been on selegiline and pramipexole in the past but had side effects with these.  There was recommendation that she see a movement specialist at the [facility], but it does not look like this ever happened.  He also referred her to physical therapy for some right-sided sciatic pain and motor issues.  Subsequently they changed her carbidopa/levodopa dosing to 2 tablets 4 times daily.  When I met her, she was back on 5 times daily dosing.  She told me at that time that 6 hours between doses was too long.  She reported some occasional wearing off.  I again referred Marlene to PT but she did not do this.  When we met 6 months ago she said that she had been overdoing it with caring for her granddaughter in terms of her lumbar spine and  cervical spine issues.  She was motivated to do PT at that time.  She was taking 1/2 tablet of the carbidopa/levodopa in between doses for wearing off, 3 times per week on average.  She felt that overall her motor symptoms were under good control.     Marlene is here alone. She is having more back pain, sore to the touch at times. She takes the 1/2 tablet 2-3 times per week.   She says that for the most part her motor symptoms have been under control as long as she takes her medication on time.  She is swimming and walking. She says that she stiffens up and gets more shaky when she is late on her dosing.  She has put off her physical therapy, but is motivated to try this going forward.   She is working at [employer], on her feet for 8 hours per day.   She says her hair is thinning.   She asks about multi-vitamin.    We continued the carbidopa/levodopa and entacapone.  I did tell Marlene that she could take an extra half tablet of the carbidopa/levodopa between doses if she was noticing wearing off, and extra entacapone if needed as well.  I did put in a referral for physical therapy for her lower back issues.    Marlene says that she still has some intermittent back pain. The physical therapy exercises are very helpful. She has been walking again.  She shows me a cyst-type lesion on her toe, digit 3 on the right.  She says she tends to curl her toes with walking.  Her hair has been thinning, which runs in the family.   She asks about fish oil.   She has trouble with sleep, has for 30 years.  She does not take any medications for sleep.  The trouble is with falling asleep.  She asks about some skin changes, related to recent breast surgery.     She typically takes just the 5 doses of her medications typically. Doses at 1AM, 5-6AM, 10AM, 3PM, 8PM.      No recent falls.  She thinks that the current regimen of carbidopa/levodopa has been appropriate and continues to be.  The wearing off between doses is less  significant.      CURRENT MEDICATIONS:   ASPIRIN PO, 2 tablets if needed  ibuprofen (ADVIL/MOTRIN) 200 MG tablet, Take 200 mg by mouth as needed for mild pain    No current facility-administered medications on file prior to visit.      RECENT DIAGNOSTIC STUDIES:   Labs: No results found for any visits on 08/08/23.    REVIEW OF SYSTEMS:                                                      10-point review of systems is negative except as mentioned above in HPI.    EXAM:                                                      Physical Exam:   Vitals: BP 99/60   Pulse 77   LMP  (LMP Unknown)   BMI= There is no height or weight on file to calculate BMI.  GENERAL: NAD. Fidgety, ?dyskinesias.  HEENT: NC/AT.  CV: RRR. S1, S2.   NECK: No bruits.  PULM: Non-labored breathing.   Neurologic:  MENTAL STATUS: Alert, attentive. Speech is fluent. Normal comprehension.  Normal concentration. Adequate fund of knowledge.   CRANIAL NERVES: Visual fields intact to confrontation. Pupils equally, round and reactive to light. Facial sensation and movement normal. EOM full. Hearing intact to conversation. Trapezius strength intact. Tongue midline.  MOTOR: 5/5 in proximal and distal muscle groups of upper and lower extremities. Tone and bulk normal.   DTRs: Intact and symmetric in biceps, BR, patellae.  Babinski down-going bilaterally.   SENSATION: Normal light touch and pinprick. Intact proprioception at great toes. Vibration: Normal at both ankles.   COORDINATION: Minimal tremor with action otherwise normal finger nose finger.  Knee heel shin normal.  STATION AND GAIT: Romberg negative. Good postural reflexes. Casual gait is normal.  Step length and arm swing appear normal.  Right hand-dominant.    ASSESSMENT and PLAN:                                                      Assessment:    ICD-10-CM    1. Parkinson's disease (H)  G20 carbidopa-levodopa (SINEMET)  MG tablet     entacapone (COMTAN) 200 MG tablet      2. Soft tissue lesion  of foot  M79.9 Orthopedic  Referral      3. Restless legs syndrome (RLS)  G25.81       4. Encounter for long-term (current) use of medications  Z79.899       5. Iron deficiency  E61.1 Ferritin    rule out          Ms. Merida is a pleasant 63-year-old woman here for follow-up regarding Parkinson's disease.  I think she may be a little bit over medicated but she is feeling good on the current carbidopa/levodopa regimen so we will not make any changes at this time.  She does not tend to take additional doses these days.  She is having several other issues that are not related to her Parkinson's.  We will check a ferritin level for the restless leg symptoms and I have referred her to podiatry for the foot issue.  I also suggested melatonin for sleep aid.  I encouraged Marlene to keep up with her physical therapy exercises since she finds these quite helpful for her back pain.  We will plan to follow-up again in 6 months.  She expressed understanding and agreement with the plan.    Plan:  -- Continue the current doses of carbidopa/levodopa and entacapone.  -- For sleep, try Melatonin: 5-10mg an hour before bedtime.   -- Let's check your ferritin level for the restless legs.  -- Talk to your surgeon or primary care provider about the changes with your breasts.  -- Podiatry referral for the cyst on your toe.  -- Keep up the good work with your physical therapy exercises.  -- I recommend an updated eye exam and dental exam.   -- Follow up  in Neurology clinic in 6 months.     Total Time: 30 minutes were spent with the patient and in chart review/documentation (as described above in Assessment and Plan)/coordinating the care on date of service.    Pat Niño MD  Neurology    Dragon software used in the dictation of this note.                    Again, thank you for allowing me to participate in the care of your patient.        Sincerely,        Pat Niño MD

## 2023-08-08 NOTE — PATIENT INSTRUCTIONS
Plan:  -- Continue the current doses of carbidopa/levodopa and entacapone.  -- For sleep, try Melatonin: 5-10mg an hour before bedtime.   -- Let's check your ferritin level for the restless legs.  -- Talk to your surgeon or primary care provider about the changes with your breasts.  -- Podiatry referral for the cyst on your toe.  -- Keep up the good work with your physical therapy exercises.  -- I recommend an updated eye exam and dental exam.   -- Follow up  in Neurology clinic in 6 months.

## 2023-08-08 NOTE — NURSING NOTE
"Marlene Merida is a 63 year old female who presents for:  Chief Complaint   Patient presents with    Parkinson     6 mo follow-up   Possible wart on her foot  Wondering if medications can cause other medical issues   Sleeping pattern is off  Still having back pain; went for PT, was beneficial but didn't complete the session        Initial Vitals:  BP 99/60   Pulse 77   LMP  (LMP Unknown)  Estimated body mass index is 17.66 kg/m  as calculated from the following:    Height as of 11/8/22: 1.676 m (5' 6\").    Weight as of 2/2/23: 49.6 kg (109 lb 6.4 oz).. There is no height or weight on file to calculate BSA. BP completed using cuff size: wrist cuff    Ben Hess  "

## 2023-08-08 NOTE — PROGRESS NOTES
ESTABLISHED PATIENT NEUROLOGY NOTE    DATE OF VISIT: 8/8/2023  MRN: [id number]  PATIENT NAME: Marlene Merida  YOB: 1960    Chief Complaint   Patient presents with    Parkinson     6 mo follow-up   Possible wart on her foot  Wondering if medications can cause other medical issues   Sleeping pattern is off  Still having back pain; went for PT, was beneficial but didn't complete the session     SUBJECTIVE:                                                      HISTORY OF PRESENT ILLNESS:  Marlene is here for follow up regarding Parkinson's disease.    History as previously documented by me (2.2.23):   I inherited the patient from Dr. Alvares.  She was initially diagnosed in 2013 with some right-sided spasticity and history of tremor.  There have been some issues with significant dyskinesias on carbidopa/levodopa so Dr. Alvares decrease her dose in July of 2021 to 1 tablet 5 times daily, along with entacapone.  She had been on selegiline and pramipexole in the past but had side effects with these.  There was recommendation that she see a movement specialist at the [facility], but it does not look like this ever happened.  He also referred her to physical therapy for some right-sided sciatic pain and motor issues.  Subsequently they changed her carbidopa/levodopa dosing to 2 tablets 4 times daily.  When I met her, she was back on 5 times daily dosing.  She told me at that time that 6 hours between doses was too long.  She reported some occasional wearing off.  I again referred Marlene to PT but she did not do this.  When we met 6 months ago she said that she had been overdoing it with caring for her granddaughter in terms of her lumbar spine and cervical spine issues.  She was motivated to do PT at that time.  She was taking 1/2 tablet of the carbidopa/levodopa in between doses for wearing off, 3 times per week on average.  She felt that overall her motor symptoms were under good control.     Marlene is here alone.  She is having more back pain, sore to the touch at times. She takes the 1/2 tablet 2-3 times per week.   She says that for the most part her motor symptoms have been under control as long as she takes her medication on time.  She is swimming and walking. She says that she stiffens up and gets more shaky when she is late on her dosing.  She has put off her physical therapy, but is motivated to try this going forward.   She is working at [employer], on her feet for 8 hours per day.   She says her hair is thinning.   She asks about multi-vitamin.    We continued the carbidopa/levodopa and entacapone.  I did tell Marlene that she could take an extra half tablet of the carbidopa/levodopa between doses if she was noticing wearing off, and extra entacapone if needed as well.  I did put in a referral for physical therapy for her lower back issues.    Marlene says that she still has some intermittent back pain. The physical therapy exercises are very helpful. She has been walking again.  She shows me a cyst-type lesion on her toe, digit 3 on the right.  She says she tends to curl her toes with walking.  Her hair has been thinning, which runs in the family.   She asks about fish oil.   She has trouble with sleep, has for 30 years.  She does not take any medications for sleep.  The trouble is with falling asleep.  She asks about some skin changes, related to recent breast surgery.     She typically takes just the 5 doses of her medications typically. Doses at 1AM, 5-6AM, 10AM, 3PM, 8PM.      No recent falls.  She thinks that the current regimen of carbidopa/levodopa has been appropriate and continues to be.  The wearing off between doses is less significant.      CURRENT MEDICATIONS:   ASPIRIN PO, 2 tablets if needed  ibuprofen (ADVIL/MOTRIN) 200 MG tablet, Take 200 mg by mouth as needed for mild pain    No current facility-administered medications on file prior to visit.      RECENT DIAGNOSTIC STUDIES:   Labs: No results found for  any visits on 08/08/23.    REVIEW OF SYSTEMS:                                                      10-point review of systems is negative except as mentioned above in HPI.    EXAM:                                                      Physical Exam:   Vitals: BP 99/60   Pulse 77   LMP  (LMP Unknown)   BMI= There is no height or weight on file to calculate BMI.  GENERAL: NAD. Fidgety, ?dyskinesias.  HEENT: NC/AT.  CV: RRR. S1, S2.   NECK: No bruits.  PULM: Non-labored breathing.   Neurologic:  MENTAL STATUS: Alert, attentive. Speech is fluent. Normal comprehension.  Normal concentration. Adequate fund of knowledge.   CRANIAL NERVES: Visual fields intact to confrontation. Pupils equally, round and reactive to light. Facial sensation and movement normal. EOM full. Hearing intact to conversation. Trapezius strength intact. Tongue midline.  MOTOR: 5/5 in proximal and distal muscle groups of upper and lower extremities. Tone and bulk normal.   DTRs: Intact and symmetric in biceps, BR, patellae.  Babinski down-going bilaterally.   SENSATION: Normal light touch and pinprick. Intact proprioception at great toes. Vibration: Normal at both ankles.   COORDINATION: Minimal tremor with action otherwise normal finger nose finger.  Knee heel shin normal.  STATION AND GAIT: Romberg negative. Good postural reflexes. Casual gait is normal.  Step length and arm swing appear normal.  Right hand-dominant.    ASSESSMENT and PLAN:                                                      Assessment:    ICD-10-CM    1. Parkinson's disease (H)  G20 carbidopa-levodopa (SINEMET)  MG tablet     entacapone (COMTAN) 200 MG tablet      2. Soft tissue lesion of foot  M79.9 Orthopedic  Referral      3. Restless legs syndrome (RLS)  G25.81       4. Encounter for long-term (current) use of medications  Z79.899       5. Iron deficiency  E61.1 Ferritin    rule out          Ms. Merida is a pleasant 63-year-old woman here for follow-up  regarding Parkinson's disease.  I think she may be a little bit over medicated but she is feeling good on the current carbidopa/levodopa regimen so we will not make any changes at this time.  She does not tend to take additional doses these days.  She is having several other issues that are not related to her Parkinson's.  We will check a ferritin level for the restless leg symptoms and I have referred her to podiatry for the foot issue.  I also suggested melatonin for sleep aid.  I encouraged Marlene to keep up with her physical therapy exercises since she finds these quite helpful for her back pain.  We will plan to follow-up again in 6 months.  She expressed understanding and agreement with the plan.    Plan:  -- Continue the current doses of carbidopa/levodopa and entacapone.  -- For sleep, try Melatonin: 5-10mg an hour before bedtime.   -- Let's check your ferritin level for the restless legs.  -- Talk to your surgeon or primary care provider about the changes with your breasts.  -- Podiatry referral for the cyst on your toe.  -- Keep up the good work with your physical therapy exercises.  -- I recommend an updated eye exam and dental exam.   -- Follow up  in Neurology clinic in 6 months.     Total Time: 30 minutes were spent with the patient and in chart review/documentation (as described above in Assessment and Plan)/coordinating the care on date of service.    Pat Niño MD  Neurology    Dragon software used in the dictation of this note.

## 2023-08-08 NOTE — LETTER
August 11, 2023      Marlene Merida  [address]   [place] [address]        Dear MsSelinaBhaktiashley,    We are writing to inform you of your test results.    Your ferritin level looks okay.  No need for additional supplementation     Resulted Orders   Ferritin   Result Value Ref Range    Ferritin 50 11 - 328 ng/mL       If you have any questions or concerns, please call the clinic at the number listed above.       Sincerely,      Pat Niño MD

## 2023-08-11 ENCOUNTER — TELEPHONE (OUTPATIENT)
Dept: NEUROLOGY | Facility: CLINIC | Age: 63
End: 2023-08-11
Payer: MEDICARE

## 2023-08-11 NOTE — TELEPHONE ENCOUNTER
----- Message from Pat Niño MD sent at 8/11/2023  1:35 PM CDT -----  Please let Marlene know that the ferritin level looks okay.  No need for additional supplementation.    Thank you,  Dr. Niño

## 2023-08-11 NOTE — RESULT ENCOUNTER NOTE
Please let Marlene know that the ferritin level looks okay.  No need for additional supplementation.    Thank you,  Dr. Niño

## 2023-09-06 ENCOUNTER — TELEPHONE (OUTPATIENT)
Dept: NEUROLOGY | Facility: CLINIC | Age: 63
End: 2023-09-06
Payer: MEDICARE

## 2023-09-06 NOTE — TELEPHONE ENCOUNTER
Patient informed there are 6 refills on file. She spoke with [facility] and they confirmed they do have the refills  Hayley Light CMA on 9/6/2023 at 10:33 AM

## 2023-09-06 NOTE — TELEPHONE ENCOUNTER
[facility] Call Center    Phone Message    May a detailed message be left on voicemail: yes     Reason for Call: Medication Refill Request    Has the patient contacted the pharmacy for the refill? Yes   Name of medication being requested: carbidopa-levodopa (SINEMET)  MG    Provider who prescribed the medication: Dr. Niño  Pharmacy: [id number] [place] [address]  Date medication is needed: ASAP. Pt has one pill for tonight and then will be out       Action Taken: Other: Neuro    Travel Screening: Not Applicable

## 2023-12-17 ENCOUNTER — HEALTH MAINTENANCE LETTER (OUTPATIENT)
Age: 63
End: 2023-12-17

## 2024-02-08 ENCOUNTER — LAB (OUTPATIENT)
Dept: LAB | Facility: HOSPITAL | Age: 64
End: 2024-02-08
Payer: MEDICARE

## 2024-02-08 ENCOUNTER — OFFICE VISIT (OUTPATIENT)
Dept: NEUROLOGY | Facility: CLINIC | Age: 64
End: 2024-02-08
Payer: MEDICARE

## 2024-02-08 VITALS
HEIGHT: 66 IN | DIASTOLIC BLOOD PRESSURE: 66 MMHG | BODY MASS INDEX: 17.66 KG/M2 | HEART RATE: 81 BPM | SYSTOLIC BLOOD PRESSURE: 104 MMHG

## 2024-02-08 DIAGNOSIS — G20.B2 PARKINSON'S DISEASE WITH DYSKINESIA AND FLUCTUATING MANIFESTATIONS (H): Primary | ICD-10-CM

## 2024-02-08 DIAGNOSIS — G20.B2 PARKINSON'S DISEASE WITH DYSKINESIA AND FLUCTUATING MANIFESTATIONS (H): ICD-10-CM

## 2024-02-08 DIAGNOSIS — R63.4 WEIGHT LOSS: ICD-10-CM

## 2024-02-08 DIAGNOSIS — L65.9 HAIR LOSS: ICD-10-CM

## 2024-02-08 LAB
ALBUMIN SERPL BCG-MCNC: 4.1 G/DL (ref 3.5–5.2)
ALBUMIN UR-MCNC: NEGATIVE MG/DL
ALP SERPL-CCNC: 92 U/L (ref 40–150)
ALT SERPL W P-5'-P-CCNC: 9 U/L (ref 0–50)
ANION GAP SERPL CALCULATED.3IONS-SCNC: 7 MMOL/L (ref 7–15)
APPEARANCE UR: CLEAR
AST SERPL W P-5'-P-CCNC: 28 U/L (ref 0–45)
BACTERIA #/AREA URNS HPF: ABNORMAL /HPF
BILIRUB SERPL-MCNC: 0.3 MG/DL
BILIRUB UR QL STRIP: NEGATIVE
BUN SERPL-MCNC: 17.9 MG/DL (ref 8–23)
CALCIUM SERPL-MCNC: 8.8 MG/DL (ref 8.8–10.2)
CHLORIDE SERPL-SCNC: 108 MMOL/L (ref 98–107)
COLOR UR AUTO: ABNORMAL
CREAT SERPL-MCNC: 0.55 MG/DL (ref 0.51–0.95)
DEPRECATED HCO3 PLAS-SCNC: 28 MMOL/L (ref 22–29)
EGFRCR SERPLBLD CKD-EPI 2021: >90 ML/MIN/1.73M2
ERYTHROCYTE [DISTWIDTH] IN BLOOD BY AUTOMATED COUNT: 12.8 % (ref 10–15)
GLUCOSE SERPL-MCNC: 105 MG/DL (ref 70–99)
GLUCOSE UR STRIP-MCNC: NEGATIVE MG/DL
HCT VFR BLD AUTO: 42 % (ref 35–47)
HGB BLD-MCNC: 13.6 G/DL (ref 11.7–15.7)
HGB UR QL STRIP: NEGATIVE
KETONES UR STRIP-MCNC: ABNORMAL MG/DL
LEUKOCYTE ESTERASE UR QL STRIP: ABNORMAL
MCH RBC QN AUTO: 31.9 PG (ref 26.5–33)
MCHC RBC AUTO-ENTMCNC: 32.4 G/DL (ref 31.5–36.5)
MCV RBC AUTO: 99 FL (ref 78–100)
MUCOUS THREADS #/AREA URNS LPF: PRESENT /LPF
NITRATE UR QL: NEGATIVE
PH UR STRIP: 5.5 [PH] (ref 5–7)
PLATELET # BLD AUTO: 387 10E3/UL (ref 150–450)
POTASSIUM SERPL-SCNC: 3.9 MMOL/L (ref 3.4–5.3)
PROT SERPL-MCNC: 7.3 G/DL (ref 6.4–8.3)
RBC # BLD AUTO: 4.26 10E6/UL (ref 3.8–5.2)
RBC URINE: 1 /HPF
SODIUM SERPL-SCNC: 143 MMOL/L (ref 135–145)
SP GR UR STRIP: 1.03 (ref 1–1.03)
SQUAMOUS EPITHELIAL: 3 /HPF
TSH SERPL DL<=0.005 MIU/L-ACNC: 1.41 UIU/ML (ref 0.3–4.2)
UROBILINOGEN UR STRIP-MCNC: <2 MG/DL
VIT B12 SERPL-MCNC: 426 PG/ML (ref 232–1245)
VIT D+METAB SERPL-MCNC: 28 NG/ML (ref 20–50)
WBC # BLD AUTO: 7.4 10E3/UL (ref 4–11)
WBC URINE: 9 /HPF

## 2024-02-08 PROCEDURE — 82306 VITAMIN D 25 HYDROXY: CPT

## 2024-02-08 PROCEDURE — 84443 ASSAY THYROID STIM HORMONE: CPT

## 2024-02-08 PROCEDURE — 81001 URINALYSIS AUTO W/SCOPE: CPT

## 2024-02-08 PROCEDURE — 99214 OFFICE O/P EST MOD 30 MIN: CPT | Performed by: PSYCHIATRY & NEUROLOGY

## 2024-02-08 PROCEDURE — 85027 COMPLETE CBC AUTOMATED: CPT

## 2024-02-08 PROCEDURE — 87086 URINE CULTURE/COLONY COUNT: CPT

## 2024-02-08 PROCEDURE — 36415 COLL VENOUS BLD VENIPUNCTURE: CPT

## 2024-02-08 PROCEDURE — 82607 VITAMIN B-12: CPT

## 2024-02-08 PROCEDURE — 80053 COMPREHEN METABOLIC PANEL: CPT

## 2024-02-08 ASSESSMENT — PATIENT HEALTH QUESTIONNAIRE - PHQ9: SUM OF ALL RESPONSES TO PHQ QUESTIONS 1-9: 12

## 2024-02-08 NOTE — LETTER
2/8/2024         RE: Marlene Merida  [address]  [facility] [facility] [place] [address]        Dear Colleague,    Thank you for referring your patient, Marlene Merida, to the [facility] NEUROLOGY CLINIC [place]. Please see a copy of my visit note below.    ESTABLISHED PATIENT NEUROLOGY NOTE    DATE OF VISIT: 2/8/2024  MRN: [id number]  PATIENT NAME: Marlene Merida  YOB: 1960    Chief Complaint   Patient presents with     Parkinson     Patient reports not sleeping at night. Medication questions as she is having hair loss.     SUBJECTIVE:                                                      HISTORY OF PRESENT ILLNESS:  Marlene is here for follow up regarding Parkinson's disease.  She is and carbidopa/levodopa and entacapone.  I recommended melatonin for sleep when we met 6 months ago.  Ferritin level was adequate at 50.    Marlene says that she is doing well from a motor standpoint but then as we are talking she and her daughter revealed that she is having a lot of on/off periods.  She is quite fidgety and flails a lot per her daughter.  Other times her tremor breakthrough.  Marlene says she has a hard time trying to schedule all of her doses of carbidopa/levodopa around eating and just taking care of other things.  She cannot keep her weight up, has noticed some hair loss.    Marlene admits that she has not done physical therapy as recommended by me.  She is scheduled the appointments but does not follow through.  She says that her cyst on her foot is also getting a lot worse and admits she did not get into see podiatry for this.  She had 1 slip and fall on the ice but otherwise has not had any falls.    CURRENT MEDICATIONS:   ASPIRIN PO, 2 tablets if needed  carbidopa-levodopa (SINEMET)  MG tablet, Take 2 tablets by mouth 5 times daily And an additional 1/2 tablet as needed for symptoms  entacapone (COMTAN) 200 MG tablet, Take 1 tablet (200 mg) by mouth 5 times daily Take with each dose of Sinemet. Ok  "to take additional tablet with as-needed extra carbi/levo dose  ibuprofen (ADVIL/MOTRIN) 200 MG tablet, Take 200 mg by mouth as needed for mild pain    No current facility-administered medications on file prior to visit.      RECENT DIAGNOSTIC STUDIES:   Labs: No results found for any visits on 02/08/24.    REVIEW OF SYSTEMS:                                                      10-point review of systems is negative except as mentioned above in HPI.    EXAM:                                                      Physical Exam:   Vitals: /66   Pulse 81   Ht 1.676 m (5' 6\")   LMP  (LMP Unknown)   BMI 17.66 kg/m    BMI= Body mass index is 17.66 kg/m .  GENERAL: NAD.  HEENT: NC/AT.  PULM: Non-labored breathing.   Focused Neurologic:  MENTAL STATUS: Alert, attentive. Speech is fluent. Normal comprehension.  Normal concentration. Adequate fund of knowledge.   CRANIAL NERVES: Visual fields intact to confrontation. Pupils equally, round and reactive to light. Facial sensation and movement normal. EOM full. Hearing intact to conversation. Trapezius strength intact. Tongue midline.  MOTOR: 5/5 in proximal and distal muscle groups of upper and lower extremities with brief testing. Tone and bulk normal.   SENSATION: Normal light touch throughout.  COORDINATION: Minimal tremor with action otherwise normal finger nose finger.    STATION AND GAIT: =Casual gait is normal.  Step length and arm swing appear normal.  Right hand-dominant.    ASSESSMENT and PLAN:                                                      Assessment:    ICD-10-CM    1. Parkinson's disease with dyskinesia and fluctuating manifestations  G20.B2 UA Macroscopic with reflex to Microscopic and Culture     Orthopedic  Referral     TSH with free T4 reflex     CBC with platelets     Comprehensive metabolic panel     Vitamin B12     Vitamin D Deficiency     Med Therapy Management Referral      2. Weight loss  R63.4       3. Hair loss  L65.9     "       Ms. Merida is a pleasant 63-year-old woman here for follow-up regarding Parkinson's disease.  She continues to have several other physical concerns that I am not sure are related to her Parkinson's.  One thing that is clear from a neurologic standpoint though is she seems to be having too much in terms of both on and off periods with the current carbidopa/levodopa regimen.  I suggested we do a trial of a different formulation such as Rytary.  I think that at that point we can get her off of the entacapone as well.  I will order some basic labs and ask the neurology pharmacist to reach out to her to help us make the transition.  We will plan to follow-up again in about 6 months.  Marlene and her daughter expressed understanding and agreement with the plan    Plan:  -- Continue current dosing of carbidopa/levodopa and entacapone for now.  -- I am going to order a different formulation called Rytary for you to take 2 or 3 times per day.  I would like to get the opinion of the neurology pharmacist first though, so I will ask them her to reach out to you.  -- New referral for podiatry is in.  -- Labs for weight loss/hair loss and monitoring purposes.  We will notify you of the results.  -- Hold off on physical therapy for now, until your body gets used to the new medication.  Return to neurology clinic in 6 months.  We will  -- Keep in touch in the meantime to keep an eye on your progress.    Total Time: 35 minutes were spent with the patient and in chart review/documentation (as described above in Assessment and Plan)/coordinating the care on date of service.    Pta Niño MD  Neurology    Dragon software used in the dictation of this note.                    Again, thank you for allowing me to participate in the care of your patient.        Sincerely,        Pat Niño MD

## 2024-02-08 NOTE — NURSING NOTE
Chief Complaint   Patient presents with    Parkinson     Patient reports not sleeping at night. Medication questions as she is having hair loss.     Michelle Mcmahan on 2/8/2024 at 10:43 AM

## 2024-02-08 NOTE — PROGRESS NOTES
ESTABLISHED PATIENT NEUROLOGY NOTE    DATE OF VISIT: 2/8/2024  MRN: [id number]  PATIENT NAME: Marlene Merida  YOB: 1960    Chief Complaint   Patient presents with    Parkinson     Patient reports not sleeping at night. Medication questions as she is having hair loss.     SUBJECTIVE:                                                      HISTORY OF PRESENT ILLNESS:  Marlene is here for follow up regarding Parkinson's disease.  She is and carbidopa/levodopa and entacapone.  I recommended melatonin for sleep when we met 6 months ago.  Ferritin level was adequate at 50.    Marlene says that she is doing well from a motor standpoint but then as we are talking she and her daughter revealed that she is having a lot of on/off periods.  She is quite fidgety and flails a lot per her daughter.  Other times her tremor breakthrough.  Marelne says she has a hard time trying to schedule all of her doses of carbidopa/levodopa around eating and just taking care of other things.  She cannot keep her weight up, has noticed some hair loss.    Marlene admits that she has not done physical therapy as recommended by me.  She is scheduled the appointments but does not follow through.  She says that her cyst on her foot is also getting a lot worse and admits she did not get into see podiatry for this.  She had 1 slip and fall on the ice but otherwise has not had any falls.    CURRENT MEDICATIONS:   ASPIRIN PO, 2 tablets if needed  carbidopa-levodopa (SINEMET)  MG tablet, Take 2 tablets by mouth 5 times daily And an additional 1/2 tablet as needed for symptoms  entacapone (COMTAN) 200 MG tablet, Take 1 tablet (200 mg) by mouth 5 times daily Take with each dose of Sinemet. Ok to take additional tablet with as-needed extra carbi/levo dose  ibuprofen (ADVIL/MOTRIN) 200 MG tablet, Take 200 mg by mouth as needed for mild pain    No current facility-administered medications on file prior to visit.      RECENT DIAGNOSTIC STUDIES:   Labs: No  "results found for any visits on 02/08/24.    REVIEW OF SYSTEMS:                                                      10-point review of systems is negative except as mentioned above in HPI.    EXAM:                                                      Physical Exam:   Vitals: /66   Pulse 81   Ht 1.676 m (5' 6\")   LMP  (LMP Unknown)   BMI 17.66 kg/m    BMI= Body mass index is 17.66 kg/m .  GENERAL: NAD.  HEENT: NC/AT.  PULM: Non-labored breathing.   Focused Neurologic:  MENTAL STATUS: Alert, attentive. Speech is fluent. Normal comprehension.  Normal concentration. Adequate fund of knowledge.   CRANIAL NERVES: Visual fields intact to confrontation. Pupils equally, round and reactive to light. Facial sensation and movement normal. EOM full. Hearing intact to conversation. Trapezius strength intact. Tongue midline.  MOTOR: 5/5 in proximal and distal muscle groups of upper and lower extremities with brief testing. Tone and bulk normal.   SENSATION: Normal light touch throughout.  COORDINATION: Minimal tremor with action otherwise normal finger nose finger.    STATION AND GAIT: =Casual gait is normal.  Step length and arm swing appear normal.  Right hand-dominant.    ASSESSMENT and PLAN:                                                      Assessment:    ICD-10-CM    1. Parkinson's disease with dyskinesia and fluctuating manifestations  G20.B2 UA Macroscopic with reflex to Microscopic and Culture     Orthopedic  Referral     TSH with free T4 reflex     CBC with platelets     Comprehensive metabolic panel     Vitamin B12     Vitamin D Deficiency     Med Therapy Management Referral      2. Weight loss  R63.4       3. Hair loss  L65.9           Ms. Merida is a pleasant 63-year-old woman here for follow-up regarding Parkinson's disease.  She continues to have several other physical concerns that I am not sure are related to her Parkinson's.  One thing that is clear from a neurologic standpoint though is she " seems to be having too much in terms of both on and off periods with the current carbidopa/levodopa regimen.  I suggested we do a trial of a different formulation such as Rytary.  I think that at that point we can get her off of the entacapone as well.  I will order some basic labs and ask the neurology pharmacist to reach out to her to help us make the transition.  We will plan to follow-up again in about 6 months.  Marlene and her daughter expressed understanding and agreement with the plan    Plan:  -- Continue current dosing of carbidopa/levodopa and entacapone for now.  -- I am going to order a different formulation called Rytary for you to take 2 or 3 times per day.  I would like to get the opinion of the neurology pharmacist first though, so I will ask them her to reach out to you.  -- New referral for podiatry is in.  -- Labs for weight loss/hair loss and monitoring purposes.  We will notify you of the results.  -- Hold off on physical therapy for now, until your body gets used to the new medication.  Return to neurology clinic in 6 months.  We will  -- Keep in touch in the meantime to keep an eye on your progress.    Total Time: 35 minutes were spent with the patient and in chart review/documentation (as described above in Assessment and Plan)/coordinating the care on date of service.    Pat Niño MD  Neurology    Dragon software used in the dictation of this note.

## 2024-02-08 NOTE — PATIENT INSTRUCTIONS
Plan:  -- Continue current dosing of carbidopa/levodopa and entacapone for now.  -- I am going to order a different formulation called Rytary for you to take 2 or 3 times per day.  I would like to get the opinion of the neurology pharmacist first though, so I will ask them her to reach out to you.  -- New referral for podiatry is in.  -- Labs for weight loss/hair loss and monitoring purposes.  We will notify you of the results.  -- Hold off on physical therapy for now, until your body gets used to the new medication.  Return to neurology clinic in 6 months.  We will  -- Keep in touch in the meantime to keep an eye on your progress.

## 2024-02-09 LAB — BACTERIA UR CULT: NORMAL

## 2024-02-12 NOTE — PROGRESS NOTES
Medication Therapy Management (MTM) Encounter    ASSESSMENT:                            Medication Adherence/Access: May benefit from reducing pill burden per patient preference.     Parkinson's Disease:  Patient would benefit from stopping Sinemet and Entacapone and switching to Rytary as recommended by neurologist. Prescription for Rytary sent to patient's preferred pharmacy. Education provided to patient on Rytary including administration and the need to separate medication from high-protein meals/food. All patient questions answered. Patient to contact pharmacist if issues with affordability of Rytary.     Pain:   May benefit from replacing daily Advil use with diclofenac gel as needed.      PLAN:                            Medication list updated   We discussed that using Diclofenac (Voltaren) gel may be helpful for your back when you wake up in the morning to limit the amount of Advil you are taking. You may be able to stop the Aspirin as well if the Diclofenac gel is effective to help reduce the number of pills you take.  Please continue to take carbidopa/levodopa 25/100mg and entacapone as prescribed until you get the new medication. Once you get the Rytary, you will need to STOP carbidopa/levodopa 25/100mg and entacapone 200mg  Remember to not eat protein heavy foods/meals about 1-2 hours before and after taking the medication to allow it to work as well as it can.   Do not hesitate to reach out with any questions or concerns via [employer] in the meantime     Medication 5 AM (when you wake up) 1pm 8pm   Rytary 48.75/195mg x3 x3 x3     Follow-up: 2/21 @ 11am via phone call     SUBJECTIVE/OBJECTIVE:                          Marlene Merida is a 63 year old female called for an initial visit. She was referred to me from Dr. Niño.      Reason for visit: Initial MTM; carbidopa/levodopa assistance.    Allergies/ADRs: Reviewed in chart  Past Medical History: Reviewed in chart  Tobacco: She reports that she has never  smoked. She has never used smokeless tobacco.  Alcohol: 1-3 beverages / month    Medication Adherence/Access: Patient reports she is very good at remembering to take her medications. She uses a pillbox which she sets up herself. Overall, would like to limit the number of pills she takes on a daily basis.     Parkinson's Disease:   Medication 8AM 12PM 4PM 8PM 12AM   Carbidopa/levodopa 25/100mg x2 x2 x2 x2 x2   Carbidopa/levodopa 25/100mg (1/2 tablet as needed)        Entacapone 200mg x1 x1 x1 x1 x1     She tends to go to bed around 11pm and will wake up at 5am. She takes her first dose of medications at 8am. Between 9am -11am and again between 9pm -11pm is when she is having the most on/off periods and can feel the medication wearing off. She is eating about 4 small meals a day; does eat around her 8am dose (peanut butter toast and orange juice) and her 8pm dose (sandwich; popcorn; soup and cracker; eggs; peanut butter). Her daughter has noticed she is also more shaky between doses. Once she takes her next dose, it takes about 20 mins or so before she is feeling good again. Very infrequent use of the 1/2 tablet as needed. When she takes the extra 1/2 tablets, she has some stomach upset and feels like she is very full so she tries to not take the extra dose. Notes that she will feel the medication wearing off and this is can make her anxious. She will sometimes break down and cry as she is so frustrated with having these on/off periods. Reports that about a month ago she stopped working to help care for her mother. When she was working, she noticed she overall felt really good and was less sore/stiff and was having less on/off periods. She is planning on resuming work and has some interviews tomorrow.     Pain:   - Advil 400mg twice daily as needed. Tends to use this daily for her achy back, specifically when she wakes up in the morning.   - Aspirin 81mg 2 tablets as needed. Usually uses this is the afternoon for  aches and stiffness. Does not use it every day.   Notes she does not toss and turn at night but rather sleeps in the same position which is why she gets so achy and stiff.     Today's Vitals: LMP  (LMP Unknown)   ----------------    I spent 47 minutes with this patient today. All changes were made via verbal approval with Dr. Niño. A copy of the visit note was provided to the patient's provider(s).    A summary of these recommendations was sent via [employer].    Brooklyn Marroquin, Pharm.D., MPH  Medication Therapy Management Pharmacist   [facility] Neurology Clinic    Telemedicine Visit Details  Type of service:  Telephone visit  Start Time: 11:00 AM  End Time: 11:47 AM     Medication Therapy Recommendations  Parkinson's disease    Current Medication: carbidopa-levodopa (SINEMET)  MG tablet (Discontinued)   Rationale: More effective medication available - Ineffective medication - Effectiveness   Recommendation: Change Medication   Status: Accepted per Provider

## 2024-02-13 ENCOUNTER — VIRTUAL VISIT (OUTPATIENT)
Dept: NEUROLOGY | Facility: CLINIC | Age: 64
End: 2024-02-13
Attending: PSYCHIATRY & NEUROLOGY
Payer: MEDICARE

## 2024-02-13 DIAGNOSIS — G20.A1 PARKINSON'S DISEASE (H): ICD-10-CM

## 2024-02-13 RX ORDER — ENTACAPONE 200 MG/1
200 TABLET ORAL
Qty: 450 TABLET | Refills: 3 | Status: SHIPPED | OUTPATIENT
Start: 2024-02-13 | End: 2024-02-13

## 2024-02-13 RX ORDER — CARBIDOPA AND LEVODOPA 25; 100 MG/1; MG/1
2 TABLET ORAL
Qty: 315 TABLET | Refills: 1 | Status: SHIPPED | OUTPATIENT
Start: 2024-02-13 | End: 2024-02-13

## 2024-02-13 RX ORDER — LEVODOPA AND CARBIDOPA 195; 48.75 MG/1; MG/1
1 CAPSULE, EXTENDED RELEASE ORAL 3 TIMES DAILY
Qty: 90 CAPSULE | Refills: 2 | Status: SHIPPED | OUTPATIENT
Start: 2024-02-13 | End: 2024-02-28

## 2024-02-13 NOTE — Clinical Note
2/13/2024       RE: Marlene Merida  [address]  [place] [address]     Dear Colleague,    Thank you for referring your patient, Marlene Merida, to the [facility] CLIN[facility] [place] [facility]. Please see a copy of my visit note below.    Medication Therapy Management (MTM) Encounter    ASSESSMENT:                            Medication Adherence/Access: {adherencechoices:775833}    ***:  ***      PLAN:                              Follow-up: {followuptest2:177087}    SUBJECTIVE/OBJECTIVE:                          Marlene Merida is a 63 year old female called for an initial visit. She was referred to me from Dr. Niño. {mtmvisitdetails:918517}     Reason for visit: Initial MTM; carbidopa/levodopa assistance.    Allergies/ADRs: Reviewed in chart  Past Medical History: Reviewed in chart  Tobacco: She reports that she has never smoked. She has never used smokeless tobacco.  Alcohol: {ALCOHOL CONSUMPTION HX:786553}  {Social and Goals:811113}    Medication Adherence/Access: {fumedadherence:828113}    Parkinson's Disease:   - Carbidopa/levodopa 25/100mg 2 tablets five times daily and 1/2 tablet as needed  - Entacapone 200mg five times daily with each dose of carbidopa/levodopa     *** Per Dr. Niño, plan is to stop entacapone and transition to Rytary   Stop entacapone slowly by 1/2 dose each week.       Insomnia:   - Melatonin 5mg at bedtime as needed     Pain:   - Advil 200mg as needed     More on/off periods and has noticed weight and hair loss.     Today's Vitals: LMP  (LMP Unknown)   ----------------  {LIZBETH?:256958}    I spent {mtm total time 3:001052} with this patient today. { :903174}. A copy of the visit note was provided to the patient's provider(s).    A summary of these recommendations was sent via [employer].    Brooklyn Marroquin, Pharm.D., MPH  Medication Therapy Management Pharmacist   [facility] Neurology  Clinic    Telemedicine Visit Details  Type of service:  Telephone visit  Start Time: {video/phone visit start time:152948}  End Time: {video/phone visit end time:152948}     Medication Therapy Recommendations  No medication therapy recommendations to display       Medication Therapy Management (MTM) Encounter    ASSESSMENT:                            Medication Adherence/Access: {adherencechoices:144654}    ***:  ***      PLAN:                              Follow-up: {followuptest2:954366}    SUBJECTIVE/OBJECTIVE:                          Marlene Merida is a 63 year old female called for an initial visit. She was referred to me from Dr. Niño.      Reason for visit: Initial MTM; carbidopa/levodopa assistance.    Allergies/ADRs: Reviewed in chart  Past Medical History: Reviewed in chart  Tobacco: She reports that she has never smoked. She has never used smokeless tobacco.  Alcohol: 1-3 beverages / month    Medication Adherence/Access: Patient reports she is very good at remembering to take her medications. She uses a pillbox which she sets up herself.     Parkinson's Disease:   - Carbidopa/levodopa 25/100mg 2 tablets five times daily and 1/2 tablet as needed  - Entacapone 200mg five times daily with each dose of carbidopa/levodopa     *** Per Dr. Niño, plan is to stop entacapone and transition to Rytary   Stop entacapone slowly by 1/2 dose each week.       Insomnia:   - Melatonin 5mg at bedtime as needed     Pain:   - Advil 200mg as needed     More on/off periods and has noticed weight and hair loss.     Today's Vitals: LMP  (LMP Unknown)   ----------------  {LIZBETH?:589654}    I spent {mtm total time 3:834870} with this patient today. { :206610}. A copy of the visit note was provided to the patient's provider(s).    A summary of these recommendations was sent via [employer].    Brooklyn Marroquin, Pharm.D., MPH  Medication Therapy Management Pharmacist   [facility] Neurology Clinic    Telemedicine Visit Details  Type of  service:  Telephone visit  Start Time: 11:00 AM  End Time: {video/phone visit end time:434011}     Medication Therapy Recommendations  No medication therapy recommendations to display         Again, thank you for allowing me to participate in the care of your patient.      Sincerely,    Brooklyn Marroquin [facility]

## 2024-02-13 NOTE — PATIENT INSTRUCTIONS
"Recommendations from today's MTM visit:                                                    MTM (medication therapy management) is a service provided by a clinical pharmacist designed to help you get the most of out of your medicines.      Medication list updated   We discussed that using Diclofenac (Voltaren) gel may be helpful for your back when you wake up in the morning to limit the amount of Advil you are taking. You may be able to stop the Aspirin as well if the Diclofenac gel is effective to help reduce the number of pills you take.  Please continue to take carbidopa/levodopa 25/100mg and entacapone as prescribed until you get the new medication. Once you get the Rytary, you will need to STOP carbidopa/levodopa 25/100mg and entacapone 200mg  Remember to not eat protein heavy foods/meals about 1-2 hours before and after taking the medication to allow it to work as well as it can.   Do not hesitate to reach out with any questions or concerns via [employer] in the meantime     Medication 5 AM (when you wake up) 1pm 8pm   Rytary 48.75/195mg x3 x3 x3     Follow-up: 2/21 @ 11am via phone call     It was great speaking with you today.  I value your experience and would be very thankful for your time in providing feedback in our clinic survey. In the next few days, you may receive an email or text message from [employer] with a link to a survey related to your  clinical pharmacist.\"     To schedule another MTM appointment, please call the clinic directly or you may call the MTM scheduling line at [phone].    My Clinical Pharmacist's contact information:                                                      Please feel free to contact me with any questions or concerns you have.      Brooklyn Marroquin, Pharm.D., MPH  Medication Therapy Management Pharmacist   [facility] Neurology Clinic   "

## 2024-02-13 NOTE — TELEPHONE ENCOUNTER
Refill request for: entacapone (COMTAN) 200 MG tablet    Directions: Take 1 tablet (200 mg) by mouth 5 times daily Take with each dose of Sinemet. Ok to take additional tablet with as-needed extra carbi/levo dos     Refill request for: carbidopa-levodopa (SINEMET)  MG tablet    Directions: Take 2 tablets by mouth 5 times daily And an additional 1/2 tablet as needed for symptoms     LOV: 2/8/24  NOV: 9/5/24    Dr. Niño- per your last OV:  -- I am going to order a different formulation called Rytary for you to take 2 or 3 times per day.  I would like to get the opinion of the neurology pharmacist first though, so I will ask them her to reach out to you.     90 day supply with 3 refills Medication T'd for review and signature  Hayley Light CMA on 2/13/2024 at 8:47 AM  [facility]

## 2024-02-13 NOTE — Clinical Note
Hi! I saw Marlene today and she is so nice. I did prescribe Rytary per your last note and gave education around this. I will be following up with her next week to check in.   Mor

## 2024-02-14 ENCOUNTER — TELEPHONE (OUTPATIENT)
Dept: NEUROLOGY | Facility: CLINIC | Age: 64
End: 2024-02-14
Payer: MEDICARE

## 2024-02-14 DIAGNOSIS — G20.A1 PARKINSON'S DISEASE (H): Primary | ICD-10-CM

## 2024-02-14 RX ORDER — ENTACAPONE 200 MG/1
TABLET ORAL
Qty: 450 TABLET | Refills: 6 | Status: SHIPPED | OUTPATIENT
Start: 2024-02-14

## 2024-02-14 RX ORDER — CARBIDOPA AND LEVODOPA 25; 100 MG/1; MG/1
TABLET ORAL
Qty: 330 TABLET | Refills: 6 | Status: SHIPPED | OUTPATIENT
Start: 2024-02-14 | End: 2024-09-24 | Stop reason: ALTCHOICE

## 2024-02-14 NOTE — TELEPHONE ENCOUNTER
Called and spoke with patient, who states Rytary is too expensive, and pharmacy is working with patient's insurance to determine options.     Patient in the meantime is in need of medications that previous were in place (Sinemet and Entacapone).  carbidopa-levodopa (SINEMET)  MG tablet - 330 tablet  Sig - Route: Take 2 tablets by mouth 5 times daily And an additional 1/2 tablet as needed for symptoms - Oral  entacapone (COMTAN) 200 MG tablet 450 tablet  Sig - Route: Take 1 tablet (200 mg) by mouth 5 times daily Take with each dose of Sinemet. Ok to take additional tablet with as-needed extra carbi/levo dose - Oral        Writer is resenting Rx's until outcome of Rytary is determined.    Signed Prescriptions:                        Disp   Refills    carbidopa-levodopa (SINEMET)  MG tab*330 ta*6        Sig: Take 2 tablets by mouth 5 times daily And an           additional 1/2 tablet as needed for symptoms  Authorizing Provider: MICHELET GONZALEZ  Ordering User: CODEY DYER    entacapone (COMTAN) 200 MG tablet          450 ta*6        Sig: Take 1 tablet (200 mg) by mouth 5 times daily Take           with each dose of Sinemet. Ok to take additional           tablet with as-needed extra carbi/levo dose  Authorizing Provider: MICHELET GONZALEZ  Ordering User: CODEY DYER    Rx sent to [facility] pharmacy. Patient contacted and aware.    Codey Dyer, RN, BSN  [facility] Neurology

## 2024-02-14 NOTE — TELEPHONE ENCOUNTER
Health Call Center    Phone Message    May a detailed message be left on voicemail: yes     Reason for Call: Medication Question or concern regarding medication   Prescription Clarification  Name of Medication: carbidopa-levodopa (RYTARY) 48. MG CPCR per CR capsule   Prescribing Provider: Pat Leyva    Pharmacy: N/A   What on the order needs clarification?     Pt is requesting a call back for a update regarding this medication. Pt is going to be out after today and she states she is requesting the less expensive generic form.     Please review and call pt to advise at # [phone].      Action Taken: Message routed to:  Other: [facility]     Travel Screening: Not Applicable

## 2024-02-14 NOTE — TELEPHONE ENCOUNTER
This is a duplicate, addressing in Te from the same date, 2/14.    Follow-up with that TE.    Codey Veras RN, BSN  [facility] Neurology

## 2024-02-14 NOTE — TELEPHONE ENCOUNTER
Called and left message for patient detailing that entacapone was discontinued along with sinemet, and replaced by Rytary (per instructions from MT appointment 2/13).    Requested call back as needed related to medication questions.    Codey Veras RN, BSN  [facility] Neurology

## 2024-02-14 NOTE — TELEPHONE ENCOUNTER
[facility] Call Center    Phone Message    May a detailed message be left on voicemail: yes     Reason for Call: Medication Refill Request    Has the patient contacted the pharmacy for the refill? Yes   Name of medication being requested:   entacapone (COMTAN) 200 MG tablet    Provider who prescribed the medication:   Pat Leyva    Pharmacy:   [id number] [place] [address]       Action Taken: Other: Neurology    Travel Screening: Not Applicable

## 2024-02-14 NOTE — TELEPHONE ENCOUNTER
[facility] Call Center    Phone Message    May a detailed message be left on voicemail: yes     Reason for Call: Medication Question or concern regarding medication   Prescription Clarification  Name of Medication: carbidopa-levodopa (RYTARY) 48. MG CPCR per CR capsule   Prescribing Provider:       What on the order needs clarification?     Patient called states that she is running low on carbidopa-levodopa (RYTARY) 48. MG CPCR per CR capsule with only 3 caps left. Patient is asking for a refill until hew new medication is approved.    Action Taken: Other: [facility] neurolog[facility]    Travel Screening: Not Applicable

## 2024-02-20 NOTE — TELEPHONE ENCOUNTER
[facility] Call Center    Phone Message    May a detailed message be left on voicemail: yes     Reason for Call: Patient called to check on the status of her Rytary request, please call back to patient to discuss    Action Taken: Other: [facility]    Travel Screening: Not Applicable

## 2024-02-21 NOTE — TELEPHONE ENCOUNTER
Called and left detailed message for patient, advising that they discuss Rytary more at MTM appointment today with Brooklyn Marroquin [facility], who should be able to look at options for getting this medication potentially through assistance programs.    Advised to call our clinic with further questions/concerns.    Codey Veras RN, BSN  [facility] Neurology

## 2024-02-27 NOTE — PROGRESS NOTES
Medication Therapy Management (MTM) Encounter    ASSESSMENT:                            Medication Adherence/Access: May benefit from reducing pill burden per patient preference.     Parkinson's Disease:  Patient would benefit from stopping Sinemet and Entacapone and switching to Rytary as previously discussed. Unfortunately, patient has not yet been able to get Rytary for an unknown reason. Would benefit from having Rytary sent to [facility] to assist in medication access, patient agreed and prescription sent. Patient to contact pharmacist if issues with affordability of Rytary. Education provided to the patient on the difference between Sinemet and Rytary. All patient questions answered.     PLAN:                            Once you receive Rytary, please stop taking both Sinemet and Entacapone and start the new medication. You will be taking Rytary 3 tablets three times daily.   Please reach out to me if you have any questions or concerns when you get the medication or affording the medication.     Medication 5 AM (when you wake up) 1pm 8pm   Rytary 48.75/195mg x3 x3 x3     Follow-up: 4/26 @ 10am via phone call     SUBJECTIVE/OBJECTIVE:                          Marlene eMrida is a 63 year old female called for a follow up visit. She was referred to me from Dr. Niño.      Reason for visit: Follow Up    Allergies/ADRs: Reviewed in chart  Past Medical History: Reviewed in chart  Tobacco: She reports that she has never smoked. She has never used smokeless tobacco.  Alcohol: 1-3 beverages / month    Medication Adherence/Access: Patient reports she is very good at remembering to take her medications. She uses a pillbox which she sets up herself. Overall, would like to limit the number of pills she takes on a daily basis.     Parkinson's Disease:   Medication 8AM 12PM 4PM 8PM 12AM   Carbidopa/levodopa 25/100mg x2 x2 x2 x2 x2   Carbidopa/levodopa 25/100mg (1/2 tablet as needed)        Entacapone 200mg x1  x1 x1 x1 x1     Patient reports she is still having wearing off periods and not much has changed since last MTM visit. She still has not received Rytary and is not sure why that is. She is open to having the prescription sent to [facility].     Today's Vitals: LMP  (LMP Unknown)   ----------------    I spent 15 minutes with this patient today. All changes were made via verbal approval with Dr. Niño. A copy of the visit note was provided to the patient's provider(s).    A summary of these recommendations was sent via [employer].    Brooklyn Marroquin, Pharm.D., MPH  Medication Therapy Management Pharmacist   [facility]    Telemedicine Visit Details  Type of service:  Telephone visit  Start Time: 12:35 PM  End Time: 12:50 PM     Medication Therapy Recommendations  Parkinson's disease    Current Medication: carbidopa-levodopa (RYTARY) 48. MG CPCR per CR capsule   Rationale: Medication product not available - Adherence - Adherence   Status: Resolved Med Access Issue   Note: change pharmacy for further assistance with coverage

## 2024-02-28 ENCOUNTER — VIRTUAL VISIT (OUTPATIENT)
Dept: NEUROLOGY | Facility: CLINIC | Age: 64
End: 2024-02-28
Attending: PSYCHIATRY & NEUROLOGY
Payer: MEDICARE

## 2024-02-28 DIAGNOSIS — G20.A1 PARKINSON'S DISEASE (H): ICD-10-CM

## 2024-02-28 RX ORDER — LEVODOPA AND CARBIDOPA 195; 48.75 MG/1; MG/1
1 CAPSULE, EXTENDED RELEASE ORAL 3 TIMES DAILY
Qty: 90 CAPSULE | Refills: 2 | Status: SHIPPED | OUTPATIENT
Start: 2024-02-28 | End: 2024-03-05

## 2024-02-28 NOTE — LETTER
2/28/2024       RE: Marlene Merida  [address]  [place] [id number]     Dear Colleague,    Thank you for referring your patient, Marlene Merida, to the [facility] [place] at [facility]. Please see a copy of my visit note below.    +1Medication Therapy Management (MTM) Encounter    ASSESSMENT:                            Medication Adherence/Access: May benefit from reducing pill burden per patient preference.     Parkinson's Disease:  ***  Patient would benefit from stopping Sinemet and Entacapone and switching to Rytary as recommended by neurologist. Prescription for Rytary sent to patient's preferred pharmacy. Education provided to patient on Rytary including administration and the need to separate medication from high-protein meals/food. All patient questions answered. Patient to contact pharmacist if issues with affordability of Rytary.     Pain:   ***  May benefit from replacing daily Advil use with diclofenac gel as needed.      PLAN:                            Medication list updated   We discussed that using Diclofenac (Voltaren) gel may be helpful for your back when you wake up in the morning to limit the amount of Advil you are taking. You may be able to stop the Aspirin as well if the Diclofenac gel is effective to help reduce the number of pills you take.  Please continue to take carbidopa/levodopa 25/100mg and entacapone as prescribed until you get the new medication. Once you get the Rytary, you will need to STOP carbidopa/levodopa 25/100mg and entacapone 200mg  Remember to not eat protein heavy foods/meals about 1-2 hours before and after taking the medication to allow it to work as well as it can.   Do not hesitate to reach out with any questions or concerns via [employer] in the meantime     Medication 5 AM (when you wake up) 1pm 8pm   Rytary 48.75/195mg x3 x3 x3     Follow-up: 4/26 @ 10am via phone call      SUBJECTIVE/OBJECTIVE:                          Marlene Merida is a 63 year old female called for a follow up visit. She was referred to me from Dr. Niño.      Reason for visit: Follow Up    Allergies/ADRs: Reviewed in chart  Past Medical History: Reviewed in chart  Tobacco: She reports that she has never smoked. She has never used smokeless tobacco.  Alcohol: 1-3 beverages / month    Medication Adherence/Access: Patient reports she is very good at remembering to take her medications. She uses a pillbox which she sets up herself. Overall, would like to limit the number of pills she takes on a daily basis.     Parkinson's Disease:   Medication 8AM 12PM 4PM 8PM 12AM   Carbidopa/levodopa 25/100mg x2 x2 x2 x2 x2   Carbidopa/levodopa 25/100mg (1/2 tablet as needed)        Entacapone 200mg x1 x1 x1 x1 x1     She tends to go to bed around 11pm and will wake up at 5am. She takes her first dose of medications at 8am. Between 9am -11am and again between 9pm -11pm is when she is having the most on/off periods and can feel the medication wearing off. She is eating about 4 small meals a day; does eat around her 8am dose (peanut butter toast and orange juice) and her 8pm dose (sandwich; popcorn; soup and cracker; eggs; peanut butter). Her daughter has noticed she is also more shaky between doses. Once she takes her next dose, it takes about 20 mins or so before she is feeling good again. Very infrequent use of the 1/2 tablet as needed. When she takes the extra 1/2 tablets, she has some stomach upset and feels like she is very full so she tries to not take the extra dose. Notes that she will feel the medication wearing off and this is can make her anxious. She will sometimes break down and cry as she is so frustrated with having these on/off periods. Reports that about a month ago she stopped working to help care for her mother. When she was working, she noticed she overall felt really good and was less sore/stiff and was  having less on/off periods. She is planning on resuming work and has some interviews tomorrow.     Today's Vitals: LMP  (LMP Unknown)   ----------------    I spent 15 minutes with this patient today. All changes were made via verbal approval with Dr. Niño. A copy of the visit note was provided to the patient's provider(s).    A summary of these recommendations was sent via [email or web address].    Brooklyn Marroquin, Pharm.D., MPH  Medication Therapy Management Pharmacist   [facility]    Telemedicine Visit Details  Type of service:  Telephone visit  Start Time: 12:35 PM  End Time: 12:50 PM     Medication Therapy Recommendations  No medication therapy recommendations to display         Medication Therapy Management (MTM) Encounter    ASSESSMENT:                            Medication Adherence/Access: May benefit from reducing pill burden per patient preference.     Parkinson's Disease:  Patient would benefit from stopping Sinemet and Entacapone and switching to Rytary as previously discussed. Unfortunately, patient has not yet been able to get Rytary for an unknown reason. Would benefit from having Rytary sent to [facility] to assist in medication access, patient agreed and prescription sent. Patient to contact pharmacist if issues with affordability of Rytary. Education provided to the patient on the difference between Sinemet and Rytary. All patient questions answered.     PLAN:                            Once you receive Rytary, please stop taking both Sinemet and Entacapone and start the new medication. You will be taking Rytary 3 tablets three times daily.   Please reach out to me if you have any questions or concerns when you get the medication or affording the medication.     Medication 5 AM (when you wake up) 1pm 8pm   Rytary 48.75/195mg x3 x3 x3     Follow-up: 4/26 @ 10am via phone call     SUBJECTIVE/OBJECTIVE:                          Marlene Merida is a 63 year old female called for a  follow up visit. She was referred to me from Dr. Niño.      Reason for visit: Follow Up    Allergies/ADRs: Reviewed in chart  Past Medical History: Reviewed in chart  Tobacco: She reports that she has never smoked. She has never used smokeless tobacco.  Alcohol: 1-3 beverages / month    Medication Adherence/Access: Patient reports she is very good at remembering to take her medications. She uses a pillbox which she sets up herself. Overall, would like to limit the number of pills she takes on a daily basis.     Parkinson's Disease:   Medication 8AM 12PM 4PM 8PM 12AM   Carbidopa/levodopa 25/100mg x2 x2 x2 x2 x2   Carbidopa/levodopa 25/100mg (1/2 tablet as needed)        Entacapone 200mg x1 x1 x1 x1 x1     She tends to go to bed around 11pm and will wake up at 5am. She takes her first dose of medications at 8am. Between 9am -11am and again between 9pm -11pm is when she is having the most on/off periods and can feel the medication wearing off. She is eating about 4 small meals a day; does eat around her 8am dose (peanut butter toast and orange juice) and her 8pm dose (sandwich; popcorn; soup and cracker; eggs; peanut butter). Her daughter has noticed she is also more shaky between doses. Once she takes her next dose, it takes about 20 mins or so before she is feeling good again. Very infrequent use of the 1/2 tablet as needed. When she takes the extra 1/2 tablets, she has some stomach upset and feels like she is very full so she tries to not take the extra dose. Notes that she will feel the medication wearing off and this is can make her anxious. She will sometimes break down and cry as she is so frustrated with having these on/off periods. Reports that about a month ago she stopped working to help care for her mother. When she was working, she noticed she overall felt really good and was less sore/stiff and was having less on/off periods. She is planning on resuming work and has some interviews tomorrow.      Today's Vitals: LMP  (LMP Unknown)   ----------------    I spent 15 minutes with this patient today. All changes were made via verbal approval with Dr. Niño. A copy of the visit note was provided to the patient's provider(s).    A summary of these recommendations was sent via [employer].    Brooklyn Marroquin, Pharm.D., MPH  Medication Therapy Management Pharmacist   [facility]    Telemedicine Visit Details  Type of service:  Telephone visit  Start Time: 12:35 PM  End Time: 12:50 PM     Medication Therapy Recommendations  No medication therapy recommendations to display            Again, thank you for allowing me to participate in the care of your patient.      Sincerely,    Brooklyn Marroquin, [facility]

## 2024-02-28 NOTE — PATIENT INSTRUCTIONS
"Recommendations from today's MTM visit:                                                       Once you receive Rytary, please stop taking both Sinemet and Entacapone and start the new medication. You will be taking Rytary 3 tablets three times daily.   Please reach out to me if you have any questions or concerns when you get the medication or affording the medication.     Medication 5 AM (when you wake up) 1pm 8pm   Rytary 48.75/195mg x3 x3 x3     Follow-up: 4/26 @ 10am via phone call     It was great speaking with you today.  I value your experience and would be very thankful for your time in providing feedback in our clinic survey. In the next few days, you may receive an email or text message from [employer] [facility] with a link to a survey related to your  clinical pharmacist.\"     To schedule another MTM appointment, please call the clinic directly or you may call the MTM scheduling line at [phone].    My Clinical Pharmacist's contact information:                                                      Please feel free to contact me with any questions or concerns you have.      Brooklyn Marroquin, Pharm.D., MPH  Medication Therapy Management Pharmacist   [facility]  Direct Voicemail: [phone]    "

## 2024-02-29 ENCOUNTER — HOSPITAL ENCOUNTER (EMERGENCY)
Facility: HOSPITAL | Age: 64
Discharge: HOME | End: 2024-02-29
Payer: MEDICARE

## 2024-02-29 ENCOUNTER — APPOINTMENT (OUTPATIENT)
Dept: RADIOLOGY | Facility: HOSPITAL | Age: 64
End: 2024-02-29
Payer: MEDICARE

## 2024-02-29 VITALS
BODY MASS INDEX: 18.48 KG/M2 | HEART RATE: 79 BPM | TEMPERATURE: 98.7 F | OXYGEN SATURATION: 99 % | DIASTOLIC BLOOD PRESSURE: 71 MMHG | WEIGHT: 115 LBS | HEIGHT: 66 IN | SYSTOLIC BLOOD PRESSURE: 128 MMHG | RESPIRATION RATE: 16 BRPM

## 2024-02-29 DIAGNOSIS — M25.562 LEFT KNEE PAIN: ICD-10-CM

## 2024-02-29 DIAGNOSIS — G20.A1 PARKINSON'S DISEASE (H): ICD-10-CM

## 2024-02-29 PROCEDURE — 73560 X-RAY EXAM OF KNEE 1 OR 2: CPT | Mod: LT

## 2024-02-29 PROCEDURE — 250N000013 HC RX MED GY IP 250 OP 250 PS 637

## 2024-02-29 PROCEDURE — 99283 EMERGENCY DEPT VISIT LOW MDM: CPT

## 2024-02-29 RX ORDER — OXYCODONE HYDROCHLORIDE 5 MG/1
5 TABLET ORAL EVERY 6 HOURS PRN
Qty: 6 TABLET | Refills: 0 | Status: SHIPPED | OUTPATIENT
Start: 2024-02-29 | End: 2024-02-29

## 2024-02-29 RX ORDER — OXYCODONE HYDROCHLORIDE 5 MG/1
5 TABLET ORAL ONCE
Status: COMPLETED | OUTPATIENT
Start: 2024-02-29 | End: 2024-02-29

## 2024-02-29 RX ADMIN — OXYCODONE HYDROCHLORIDE 5 MG: 5 TABLET ORAL at 19:43

## 2024-02-29 ASSESSMENT — ACTIVITIES OF DAILY LIVING (ADL): ADLS_ACUITY_SCORE: 35

## 2024-02-29 ASSESSMENT — COLUMBIA-SUICIDE SEVERITY RATING SCALE - C-SSRS
2. HAVE YOU ACTUALLY HAD ANY THOUGHTS OF KILLING YOURSELF IN THE PAST MONTH?: NO
1. IN THE PAST MONTH, HAVE YOU WISHED YOU WERE DEAD OR WISHED YOU COULD GO TO SLEEP AND NOT WAKE UP?: NO
6. HAVE YOU EVER DONE ANYTHING, STARTED TO DO ANYTHING, OR PREPARED TO DO ANYTHING TO END YOUR LIFE?: NO

## 2024-03-01 NOTE — DISCHARGE INSTRUCTIONS
You were seen in the ER for left knee pain.    Rest, ice/heat, compression with ACE wrap, elevate your extremity, increase activity as tolerates. You may use topical Icy Hot or Lidoderm as needed. You may use ibuprofen for swelling/pain and Tylenol as needed for pain. You may take oxycodone as needed for severe pain - do NOT drive on this medication as it can cause drowsiness. Additionally, please take a stool softener as this medication can cause constipation. This medication can be addicting, please limit your use and discard any remaining tablets.     Ibuprofen/Naproxen Discharge Instructions:  You may take ibuprofen for pain control.  The maximum dose of (ibuprofen is 3200 mg ) in a 24-hour period.    Take this medication with food to prevent stomach irritation.  With long-term use this medication can irritate the stomach causing pain and lead to development of a stomach ulcer.  If you notice stomach pain or vomiting of coffee-ground colored vomit or blood, please be seen by a healthcare provider.  Attempt to use this medication for the shortest time possible.      Tylenol (Acetaminophen) Discharge Instructions:  You may take 2 tablets of regular strength, over-the-counter, Tylenol (acetaminophen) every 4-6 hours as needed for pain.  Take no more than 4000 mg of Tylenol in a 24-hour period.      Avoid taking more than 1 acetaminophen-containing product at a time and be aware that many over-the-counter medications contain a combination of acetaminophen and other products.  If you are taking Tylenol in addition to a combination product please keep track of your daily acetaminophen dose to make sure you do not exceed the recommended 4000 mg.  Taking too much acetaminophen can cause permanent damage to your liver.    Follow-up with your primary care provider and/or orthopedics for reevaluation and possible additional imaging, joint steroid injection, and/or physical therapy referral.     Return to the emergency  department for any new or worsening symptoms including increased pain, redness/warmth/drainage/swelling, fever/chills, new weakness, numbness/tingling, decreased range of motion, cold extremity, or any other concerning symptoms.     Take Care!  - Anne Marie Quintana PA-C

## 2024-03-01 NOTE — ED NOTES
Road tested pt with walker and then after with no walker; pt had no problem walking with and without the walker; pt is discharged.

## 2024-03-01 NOTE — ED PROVIDER NOTES
EMERGENCY DEPARTMENT ENCOUNTER      NAME: Marlene Merida  AGE: 63 year old female  YOB: 1960  MRN: [id number]  EVALUATION DATE & TIME: No admission date for patient encounter.    PCP: Ryne Fu    ED PROVIDER: Anne Marie Quintana PA-C      Chief Complaint   Patient presents with    Knee Pain         FINAL IMPRESSION:  1. Left knee pain    2. Parkinson's disease          ED COURSE & MEDICAL DECISION MAKIN:13 PM Met with patient for initial interview. Plan for care discussed.  8:30 PM Reevaluated and updated patient. I discussed the likely plan for discharge with the patient as long as XR negative, and patient is agreeable.   8:44 PM Reevaluated and updated patient. I discussed the plan for discharge with the patient, and patient is agreeable. We discussed supportive cares at home and reasons for return to the ER including new or worsening symptoms. All questions and concerns addressed. Patient to be discharged by RN.    63 year old female with a history of Parkinson's disease presents to the Emergency Department for evaluation of non-traumatic left knee pain. Upon exam, patient is cachectic appearing, afebrile, hemodynamically stable, and in no acute distress. Global knee tenderness to palpation, most significant in lateral joint line. No obvious effusion or bony deformity. Neurovascularly intact distally. Full ROM with minimal reproducible pain with full flexion. No overlying evidence of cellulitis. Not consistent with septic joint as no overlying skin changes or fevers, and full ROM. No calf swelling or tenderness to suggest DVT. Differential diagnosis includes but not limited to fracture, patellofemoral syndrome, osteoarthritic flare, sprain/strain, meniscal vs ligamentous injury. Based on patient's presenting symptoms, imaging was ordered. XR with minimal tricompartmental osteoarthrosis. No fracture, effusion or calcified intra-articular body.     Patient took 2 aspirin just prior to  arrival with minimal pain relief. Patient was treated with oxycodone upon arrival with only moderate improvement in pain, but patient declining additional analgesia upon reevaluation. Patient was made aware of the above findings. Plan to discharge patient home with symptomatic management (RICE), strict return precautions, and close follow up with their PCP and/or ortho for reevaluation and ongoing management - referral for PCP ordered today. ACE wrap applied to left knee per patient's request and patient passed ambulation trial with walker in the ED, patient's neighbor states she has a walker at home. The patient was stable and well appearing upon discharge. The patient was advised to return to the ER if any new or worsening symptoms develop. The patient verbalizes understanding and agrees with the plan.     Medical Decision Making  Obtained supplemental history:Supplemental history obtained?: Documented in chart and Family Member/Significant Other  Reviewed external records: External records reviewed?: Documented in chart  Care impacted by chronic illness:Other: Parkinson's disease  Care significantly affected by social determinants of health:N/A  Did you consider but not order tests?: Work up considered but not performed and documented in chart, if applicable  Did you interpret images independently?: Independent interpretation of ECG and images noted in documentation, when applicable.  Consultation discussion with other provider:Did you involve another provider (consultant, MH, pharmacy, etc.)?: No  Discharge. No recommendations on prescription strength medication(s). See documentation for any additional details.    MEDICATIONS GIVEN IN THE EMERGENCY:  Medications   oxyCODONE (ROXICODONE) tablet 5 mg (5 mg Oral $Given 2/29/24 1943)       NEW PRESCRIPTIONS STARTED AT TODAY'S ER VISIT  New Prescriptions    No medications on file           =================================================================    HPI    Patient information was obtained from: patient and patient's neighbor    Use of : N/A       Marlene Merida is a 63 year old female with a pertinent history of Parkinson's disease who presents to this ED for evaluation of left knee pain.  Patient is accompanied by her neighbor who reports she developed left knee pain approximately 28 hours ago.  Patient reports she was cleaning the kitchen when her left knee buckled on her.  She reports global pain to the left knee that is exacerbated with ambulation.  She reports the pain is most significant on the lateral aspect.  She denies any falls or trauma.  She denies any weakness/numbness or tingling.  She has been taking aspirin, icing, and taking ibuprofen with minimal pain relief.  She is accompanied by her neighbor who reports her pain is more severe than she is describing at this time.  He reports that she has a difficult time walking secondary to pain.  She does not use any walking devices at home.  She does have a history of Parkinson's disease and chronic pain.  She is in the process of starting a new medication prescribed by her neurologist, but unsure what this medication is.  She denies any skin changes, calf tenderness or swelling, history of DVT/PE, fevers or chills, oral estrogen, tobacco use, bleeding/clotting disorders.     REVIEW OF SYSTEMS   Review of Systems see HPI    PAST MEDICAL HISTORY:  Past Medical History:   Diagnosis Date    Parkinson's disease        PAST SURGICAL HISTORY:  Past Surgical History:   Procedure Laterality Date    BREAST SURGERY      GYN SURGERY             CURRENT MEDICATIONS:    ASPIRIN PO  carbidopa-levodopa (RYTARY) 48. MG CPCR per CR capsule  carbidopa-levodopa (SINEMET)  MG tablet  entacapone (COMTAN) 200 MG tablet  ibuprofen (ADVIL/MOTRIN) 200 MG tablet        ALLERGIES:  No Known Allergies    FAMILY HISTORY:  Family History  "  Problem Relation Age of Onset    Alzheimer Disease Mother     Hypertension Mother     Hypertension Father     Arthritis Father     Diabetes Paternal Grandmother     Diabetes Brother     Breast Cancer No family hx of        SOCIAL HISTORY:   Social History     Socioeconomic History    Marital status:    Tobacco Use    Smoking status: Never    Smokeless tobacco: Never   Substance and Sexual Activity    Alcohol use: Yes     Comment: 1-2 beers a month     Drug use: Never    Sexual activity: Not Currently   Other Topics Concern    Parent/sibling w/ CABG, MI or angioplasty before 65F 55M? No       VITALS:  /72   Pulse 84   Temp 98.7  F (37.1  C) (Oral)   Resp 18   Ht 1.676 m (5' 6\")   Wt 52.2 kg (115 lb)   LMP  (LMP Unknown)   SpO2 100%   BMI 18.56 kg/m      PHYSICAL EXAM    Constitutional:  Alert, in no acute distress. Cooperative. Cachectic.   EYES: Conjunctivae clear.  HENT:  Atraumatic, normocephalic.  Respiratory:  Respirations even, unlabored, in no acute respiratory distress.  Cardiovascular:  Regular rate, good peripheral perfusion.  GI: Soft, flat, non-distended.  Musculoskeletal: Left lower extremity: global tenderness to palpation of knee, most significant in lateral joint line. No overlying erythema, warmth, purulence, fluctuance, edema, ecchymosis, crepitus, or obvious bony deformity. No obvious joint laxity. Full ROM, mild reproducible pain with full flexion. Neurovascularly intact distally. Cap refill <2 seconds. 2+ DP and PT pulses bilaterally. 5/5 strength. Compartments soft and no calf tenderness to palpation.   Integument: Warm, Dry.   Neurologic:  Alert & oriented. No focal deficits noted. Resting tremor.   Psych: Normal mood and affect.      LAB:  All pertinent labs reviewed and interpreted.  Results for orders placed or performed during the hospital encounter of 02/29/24   XR Knee Left 1/2 Views    Impression    IMPRESSION: Minimal tricompartmental osteoarthrosis. No fracture, " effusion or calcified intra-articular body.       RADIOLOGY:  Reviewed all pertinent imaging. Please see official radiology report.  XR Knee Left 1/2 Views   Preliminary Result   IMPRESSION: Minimal tricompartmental osteoarthrosis. No fracture, effusion or calcified intra-articular body.        Anne Marie Quintana PA-C  [facility] EMERGENCY DEPARTMENT  [address] [place] [id number]  [phone]      Anne Marie Quintana PA-C  02/29/24 [id number]

## 2024-03-01 NOTE — ED TRIAGE NOTES
Searing left knee pain started yesterday. No known injury. Walks a lot. Father had bad knees. Took ASA at 6pm which did help.

## 2024-03-04 ENCOUNTER — TELEPHONE (OUTPATIENT)
Dept: NEUROLOGY | Facility: CLINIC | Age: 64
End: 2024-03-04
Payer: MEDICARE

## 2024-03-04 DIAGNOSIS — G20.A1 PARKINSON'S DISEASE (H): ICD-10-CM

## 2024-03-04 NOTE — TELEPHONE ENCOUNTER
Health Call Center    Phone Message    May a detailed message be left on voicemail: yes     Reason for Call: Medication Question or concern regarding medication   Prescription Clarification  Name of Medication: Rytary  Prescribing Provider: Dr. Niño   Pharmacy: [facility]    What on the order needs clarification? Patient is calling to notify Dr. Niño that she has been waiting on her Rx and didn't know the status of what was decided with her medication. Patient is calling  Specialty Pharmacy to check status and cost.     Action Taken: Other: Neurology    Travel Screening: Not Applicable

## 2024-03-05 RX ORDER — LEVODOPA AND CARBIDOPA 195; 48.75 MG/1; MG/1
3 CAPSULE, EXTENDED RELEASE ORAL 3 TIMES DAILY
Qty: 270 CAPSULE | Refills: 2 | Status: SHIPPED | OUTPATIENT
Start: 2024-03-05 | End: 2024-06-03

## 2024-03-05 NOTE — TELEPHONE ENCOUNTER
Covering for Brooklyn Marroquin. Reviewed Rytary prescription and it appears it may have been ordered for a dosage that is too low. Updating  prescription per recommendations from Brooklyn HUTCHINS on 2/28/24.    Called [place] pharmacy about 5 hours later. They are still processing the prescription but indicated they would call me back later this afternoon or tomorrow with an update.    Angela Ceballos, Pharm.D.  Medication Therapy Management Pharmacist  [facility]

## 2024-03-06 NOTE — TELEPHONE ENCOUNTER
Spoke with a tech at [facility] mail order pharmacy. Was informed the Rytary went through insurance for cost of $4.60 for #270 or 1 month supply. [facility] will call the patient to set up the first order.    Marcela StroudD.  Medication Therapy Management Pharmacist  [facility]

## 2024-03-06 NOTE — TELEPHONE ENCOUNTER
[facility]    Phone Message    May a detailed message be left on voicemail: yes     Reason for Call: Medication Question or concern regarding medication   Prescription Clarification  Name of Medication: Rytary  Prescribing Provider: Pat Leyva   Pharmacy:    What on the order needs clarification?     Pt is requesting a call back to see if there is anything that can be done regarding the cost of this medication or a different medication that is cheaper.   Please call back to advise at # [phone].      Action Taken: Message routed to:  Other: [facility]     Travel Screening: Not Applicable

## 2024-03-07 NOTE — TELEPHONE ENCOUNTER
[facility] pharmacy tech had already reached out to patient and pt is super happy with the result. No concerns at the moment.       Ben 3/7/2024 2:53 PM

## 2024-03-08 ENCOUNTER — OFFICE VISIT (OUTPATIENT)
Dept: FAMILY MEDICINE | Facility: CLINIC | Age: 64
End: 2024-03-08
Payer: MEDICARE

## 2024-03-08 VITALS
SYSTOLIC BLOOD PRESSURE: 132 MMHG | HEART RATE: 87 BPM | BODY MASS INDEX: 18.44 KG/M2 | HEIGHT: 64 IN | RESPIRATION RATE: 12 BRPM | TEMPERATURE: 98 F | WEIGHT: 108 LBS | DIASTOLIC BLOOD PRESSURE: 75 MMHG | OXYGEN SATURATION: 98 %

## 2024-03-08 DIAGNOSIS — Z12.11 SCREEN FOR COLON CANCER: ICD-10-CM

## 2024-03-08 DIAGNOSIS — R26.9 ABNORMALITY OF GAIT: ICD-10-CM

## 2024-03-08 DIAGNOSIS — Z12.31 ENCOUNTER FOR SCREENING MAMMOGRAM FOR BREAST CANCER: ICD-10-CM

## 2024-03-08 DIAGNOSIS — Z13.9 ENCOUNTER FOR SCREENING INVOLVING SOCIAL DETERMINANTS OF HEALTH (SDOH): ICD-10-CM

## 2024-03-08 DIAGNOSIS — M25.362 KNEE GIVES OUT, LEFT: Primary | ICD-10-CM

## 2024-03-08 DIAGNOSIS — Z78.0 MENOPAUSE: ICD-10-CM

## 2024-03-08 DIAGNOSIS — G20.A1 PARKINSON'S DISEASE, UNSPECIFIED WHETHER DYSKINESIA PRESENT, UNSPECIFIED WHETHER MANIFESTATIONS FLUCTUATE (H): ICD-10-CM

## 2024-03-08 PROCEDURE — 99214 OFFICE O/P EST MOD 30 MIN: CPT | Performed by: FAMILY MEDICINE

## 2024-03-08 RX ORDER — DIAZEPAM 2 MG
2 TABLET ORAL ONCE
Qty: 1 TABLET | Refills: 0 | Status: SHIPPED | OUTPATIENT
Start: 2024-03-08 | End: 2024-03-08

## 2024-03-08 NOTE — COMMUNITY RESOURCES LIST (ENGLISH)
03/08/2024   [facility]  N/A  For questions about this resource list or additional care needs, please contact your primary care clinic or care manager.  Phone: [phone]   Email: N/A   Address: [address] [place] [id number]   Hours: N/A        Financial Stability       Rent and mortgage payment assistance  1  [place] [facility] Distance: 0.38 miles      [address] [place] [address]  Language: English, Mongolian  Hours: Mon 9:30 AM - 11:30 AM , Tue 1:30 PM - 7:30 PM , Thu 9:00 AM - 7:00 PM , Fri 9:30 AM - 11:30 AM   Phone: (951) 431-6485 Email: info@[employer].[place] Website: http://[employer].org/     2  [facility] [place] Distance: 5.52 miles      In-Person, Phone/[address] [address]  Language: English, Mongolian  Hours: Mon - Thu 9:00 AM - 4:00 PM  Fees: Free   Phone: (316) 434-2218 Email: center@[facility] Website: https://www.[facility]s.[facility]/community-resource-[place]/          Food and Nutrition       Food pantry  3  [place] [facility] - Food Shelf Distance: 0.38 miles      [facility]   [address] [place] [address]  Language: English, Mongolian  Hours: Mon 9:30 AM - 11:30 AM , Wed 9:30 AM - 11:30 AM , Thu 1:30 PM - 6:30 PM , Fri 9:30 AM - 11:30 AM  Fees: Free   Phone: (405) 185-7531 Email: info@[employer] Website: http://[facility].org/     4  [place] Food Shelf Distance: 1.58 miles      In-Person, Pickup   [address] [place] [address]  Language: English  Hours: Mon - Thu 9:00 AM - 10:00 AM  Fees: Free   Phone: (229) 761-8624 Email: communication@[employer].org Website: http://www.[facility].org     SNAP application assistance  5  [facility] Services - Economic Support [place] [facility] Distance: 1.7 miles      In-Person, Phone/Virtual   [address] [address] [address]  Language: English  Hours: Mon - Fri 8:00 AM - 4:30  PM  Fees: Free   Phone: (785) 321-8525 Email: yolande@co.[facility]. Website: https://www.[facility]/787/Economic-Support     [address] [place] [address] Services - Economic Support [facility] Distance: 9.98 miles      In-Person, Phone/[place] [address]  Language: English  Hours: Mon - Fri 8:00 AM - 4:30 PM  Fees: Free   Phone: (650) 946-3935 Email: louisalfredo@co.[facility] Website: https://www.co.[facility]/787/Economic-Support     Soup kitchen or free meals  7  [facility] - General acute hospital - Thursday Night Community Meal Distance: 5.52 miles      In-Person   [address] [place] [address]  Language: English, Stateless  Hours: Thu 6:00 PM - 7:00 PM  Fees: Free   Phone: (405) 226-3032 Email: center@[facility] [place] Website: https://www.[facility] [place]/community-resource-center/     8  [facility] - Homeless Outreach Services Team - Food Assistance Distance: 5.53 miles      Pickup   [address] [place] [address]  Language: English  Hours: Mon - Thu 9:30 AM - 3:30 PM  Fees: Free   Phone: (471) 284-9350 Email: office@[facility] [place] Website: https://www.[facility] [place]/community-resource-center/          Important Numbers & Websites       Emergency Services   911  [facility] Services   311  Poison Control   (477) 765-2112  Suicide Prevention Lifeline   (687) 227-9066 (TALK)  Child Abuse Hotline   (470) 168-2251 (4-A-Child)  Sexual Assault Hotline   (835) 113-4829 (HOPE)  National Runaway Safeline   (227) 318-2124 (RUNAWAY)  All-Options Talkline   (654) 149-6374  Substance Abuse Referral   (307) 744-7656 (HELP)

## 2024-03-08 NOTE — PROGRESS NOTES
Assessment & Plan   Problem List Items Addressed This Visit       Abnormality of gait    Parkinson's disease     Under care of neurology. Notes reviewed. Plan to continue specialty care. Discussed strategies for discussing with family.         Relevant Medications    diazepam (VALIUM) 2 MG tablet    Knee gives out, left - Primary     X-rays reviewed and minimal tricompartmental arthritis, with knee giving out with twisting motions and significantly worried for a meniscal injury.  Will get MRI for further evaluation.         Relevant Medications    diazepam (VALIUM) 2 MG tablet    Other Relevant Orders    MR Knee Left w/o Contrast     Other Visit Diagnoses       Encounter for screening involving social determinants of health (SDoH)        Relevant Orders    Primary Care - Care Coordination Referral    Screen for colon cancer        Relevant Orders    Colonoscopy Screening  Referral    Encounter for screening mammogram for breast cancer        Relevant Orders    *MA Screening Digital Bilateral    Menopause        Relevant Orders    DEXA HIP/PELVIS/SPINE - Future            MED REC REQUIRED  Post Medication Reconciliation Status: discharge medications reconciled, continue medications without change  Regular exercise  See Patient Instructions      Lisa Rosario is a 63 year old, presenting for the following health issues:  ER F/U (2/29/24; [facility], Vale's; 1. Left Knee Pain; 2. Parkinson's Disease)        3/8/2024     7:02 AM   Additional Questions   Roomed by AYE Mccullough   Accompanied by Self         3/8/2024     7:02 AM   Patient Reported Additional Medications   Patient reports taking the following new medications N/A     Via the Health Maintenance questionnaire, the patient has reported the following services have been completed -Mammogram, this information has been sent to the abstraction team.  Patient presents for follow-up emergency room visit.  Patient does have underlying Parkinson's which has been  very controlled through neurology.  However her primary care provider did retire a few years ago she has not been getting routine care.  Overall, the pregnancy she is felt relatively healthy.  Abdomen soft she does have some back stiffness.  Magnesium in the greatest portion of difficulty.  Normally they do fine but over the past few months her left knee has been more bothersome.  Especially when she is walking and she has to do a twisting motion and she gets a sharp pain in the knee.  Most recently that sharp pain with twisting actually has causing her to fall.  She was able to get herself and not fall to the floor.  It was witnessed by family and thus they were taken to the emergency room.  Patient admits that it has happened once or twice prior to this.  Each time it has been while standing at the counter and twisting in which the left knee will give out.  She is also able to catch herself and has never fallen.    Also of note with her Parkinson's, she admitted that she has not told 2 of her 5 children.  In May she states she almost feels understand the diagnosis.  I did offer that if she would like to tell her children but would like to have company for that discussion and when going to help schedule an appointment clinic and she can bring her children with and we can tell them together and that way I can be available to answer any questions they may have.    We also discussed that if she would like to stay with me as her primary care she is more than welcome.  Since her other previous provider has retired, I will definitely take over as her primary care but she can either keep me as that or if she like to find another, then I will remain as her primary until that relationship has been established.    Additionally, going through TSH screening patient has has difficulties with transportation, as well as some financial concerns around food.  She admits she has not used the local food shelf.  Discussed with patient  "that we do have resources which can possibly aid and help get her in contact with some of the other local resources to help with these areas.  Patient is interested in having this discussions.            Objective    /75 (BP Location: Left arm, Patient Position: Sitting, Cuff Size: Adult Large)   Pulse 87   Temp 98  F (36.7  C) (Oral)   Resp 12   Ht 1.632 m (5' 4.25\")   Wt 49 kg (108 lb)   LMP  (LMP Unknown)   SpO2 98%   Breastfeeding No   BMI 18.39 kg/m    Body mass index is 18.39 kg/m .  Physical Exam  Vitals and nursing note reviewed.   Constitutional:       General: She is not in acute distress.     Appearance: Normal appearance. She is not ill-appearing.   HENT:      Head: Normocephalic and atraumatic.   Eyes:      Extraocular Movements: Extraocular movements intact.      Conjunctiva/sclera: Conjunctivae normal.   Pulmonary:      Effort: Pulmonary effort is normal.   Musculoskeletal:      Comments: Left knee with tenderness palpation of the bilateral joint lines.  Positive Apley, negative drawer and Lachman.  No redness or deformity.  Negative patellar apprehension.   Neurological:      Mental Status: She is alert and oriented to person, place, and time.      Motor: No weakness.      Gait: Gait normal.      Deep Tendon Reflexes:      Reflex Scores:       Patellar reflexes are 2+ on the right side and 2+ on the left side.       Achilles reflexes are 2+ on the right side and 2+ on the left side.  Psychiatric:         Attention and Perception: Attention normal.         Mood and Affect: Mood normal.         Speech: Speech normal.         Thought Content: Thought content normal.              Signed Electronically by: Jeffry Warner DO    "

## 2024-03-08 NOTE — ASSESSMENT & PLAN NOTE
X-rays reviewed and minimal tricompartmental arthritis, with knee giving out with twisting motions and significantly worried for a meniscal injury.  Will get MRI for further evaluation.

## 2024-03-08 NOTE — ASSESSMENT & PLAN NOTE
Under care of neurology. Notes reviewed. Plan to continue specialty care. Discussed strategies for discussing with family.

## 2024-03-12 ENCOUNTER — PATIENT OUTREACH (OUTPATIENT)
Dept: CARE COORDINATION | Facility: CLINIC | Age: 64
End: 2024-03-12
Payer: MEDICARE

## 2024-03-12 NOTE — PROGRESS NOTES
Clinic Care Coordination Contact  Community Health Worker Initial Outreach    CHW Initial Information Gathering:  Referral Source: PCP  Preferred Hospital: [facility]  [phone]  Preferred Urgent Care: [facility], [phone]  No PCP office visit in Past Year: No       Patient accepts CC: No, due to the patient stating that at this time there are no concerns or questions for CCC to assist with. Patient will be sent Care Coordination introduction letter for future reference.     TERI Bishop  [phone]  Connected Care Resource [facility]

## 2024-03-12 NOTE — LETTER
[facility] CARE COORDINATION  [address]  [place] [address]    March 12, 2024    Marlene Merida  [address]   [place] [address]      Dear Marlene,    I am a clinic community health worker who works with Jeffry Warner DO with the [facility]. I wanted to thank you for spending the time to talk with me.  Below is a description of clinic care coordination and how we can further assist you.       The clinic care coordination team is made up of a registered nurse, , financial resource worker and community health worker who understand the health care system. The goal of clinic care coordination is to help you manage your health and improve access to the health care system. Our team works alongside your provider to assist you in determining your health and social needs. We can help you obtain health care and community resources, providing you with necessary information and education. We can work with you through any barriers and develop a care plan that helps coordinate and strengthen the communication between you and your care team.  Our services are voluntary and are offered without charge to you personally.    Please feel free to contact Nidhi at [phone] with any questions or concerns regarding care coordination and what we can offer.      We are focused on providing you with the highest-quality healthcare experience possible.    Sincerely,     TERI Bishop  Connected Care Resource Center  [facility]

## 2024-03-18 NOTE — TELEPHONE ENCOUNTER
"Called and spoke with patient advising them to continue with medication, Rytary for at least a few weeks to determine its effectiveness. Patient states understanding (\"just wanted to check in with the team\").    Codey Veras RN, BSN  [facility]      "

## 2024-03-18 NOTE — TELEPHONE ENCOUNTER
[facility]    Phone Message    May a detailed message be left on voicemail: yes     Reason for Call:     Patient called to speak to care team ,patient wants to know if she should continue using medications as it has only been 3 days since she started? Patient feels that it is not working yet and so she is unsure if she should continue it or not. Please advise.       Action Taken: Other: [facility]    Travel Screening: Not Applicable

## 2024-05-10 ENCOUNTER — TELEPHONE (OUTPATIENT)
Dept: FAMILY MEDICINE | Facility: CLINIC | Age: 64
End: 2024-05-10
Payer: MEDICARE

## 2024-05-10 ENCOUNTER — TELEPHONE (OUTPATIENT)
Dept: NEUROLOGY | Facility: CLINIC | Age: 64
End: 2024-05-10
Payer: MEDICARE

## 2024-05-10 NOTE — CONFIDENTIAL NOTE
PharmD Outbound Call:     Reason for call: missed MTM appointment    Call attempt: no answer x2, left VM for patient to call and sent [employer] message     Brooklyn Marroquin, Pharm.D., MPH  Medication Therapy Management Pharmacist   [facility]

## 2024-05-10 NOTE — TELEPHONE ENCOUNTER
Patient Quality Outreach    Patient is due for the following:   Breast Cancer Screening - Mammogram    Next Steps:   No follow up needed at this time.  Discussed/ordered at 3/8/24 PCP visit.    Type of outreach:    Chart review performed, no outreach needed.      Questions for provider review:    None           Joseph Mccullough Jr., MA

## 2024-06-03 ENCOUNTER — TELEPHONE (OUTPATIENT)
Dept: NEUROLOGY | Facility: CLINIC | Age: 64
End: 2024-06-03
Payer: MEDICARE

## 2024-06-03 DIAGNOSIS — G20.A1 PARKINSON'S DISEASE (H): ICD-10-CM

## 2024-06-03 RX ORDER — LEVODOPA AND CARBIDOPA 195; 48.75 MG/1; MG/1
3 CAPSULE, EXTENDED RELEASE ORAL 3 TIMES DAILY
Qty: 270 CAPSULE | Refills: 2 | Status: SHIPPED | OUTPATIENT
Start: 2024-06-03 | End: 2024-08-23

## 2024-06-03 NOTE — TELEPHONE ENCOUNTER
Health Call Center    Phone Message    May a detailed message be left on voicemail: yes     Reason for Call: Medication Refill Request    Has the patient contacted the pharmacy for the refill? Yes   Name of medication being requested: carbidopa-levodopa (RYTARY) 48. MG CPCR per CR capsule  Provider who prescribed the medication: Pat Leyva  Pharmacy: [facility] [address] [facility]  Date medication is needed: Has 2 and a half tablets left.     Pt is requesting a refill for the medication listed above. Please call pt with questions or concerns at # [phone]    Action Taken: Message routed to:  Other: [facility]    Travel Screening: Not Applicable     Date of Service:

## 2024-06-03 NOTE — TELEPHONE ENCOUNTER
Signed Prescriptions:                        Disp   Refills    carbidopa-levodopa (RYTARY) 48. MG C*270 ca*2        Sig: Take 3 capsules by mouth 3 times daily  Authorizing Provider: MICHELET GONZALEZ  Ordering User: BERNARDINO VERDIN RN reviewed refill request and chart notes. 90 day supply sent to preferred pharmacy.     Bernardino Verdin RN, BSN  [facility]

## 2024-07-14 ENCOUNTER — HEALTH MAINTENANCE LETTER (OUTPATIENT)
Age: 64
End: 2024-07-14

## 2024-08-06 ENCOUNTER — TELEPHONE (OUTPATIENT)
Dept: NEUROLOGY | Facility: CLINIC | Age: 64
End: 2024-08-06
Payer: COMMERCIAL

## 2024-08-06 DIAGNOSIS — G20.B2 PARKINSON'S DISEASE WITH DYSKINESIA AND FLUCTUATING MANIFESTATIONS (H): Primary | ICD-10-CM

## 2024-08-06 NOTE — TELEPHONE ENCOUNTER
Health Call Center    Phone Message    May a detailed message be left on voicemail: yes     Reason for Call: Patient called with a medications question regarding carbidopa-levodopa (RYTARY) 48. MG CPCR per CR capsule, patient is inquiring if Rytary causes hallucinations? If so, what can she do to help with this? Can does be adjusted? Please call back at [phone]    Action Taken: Other: [facility]    Travel Screening: Not Applicable     Date of Service:

## 2024-08-07 NOTE — TELEPHONE ENCOUNTER
[facility]    Phone Message    May a detailed message be left on voicemail: yes     Reason for Call:  Patient is calling back to speak to care team regarding possible hallucination when taking Rytary, please call back to discuss at [phone]     Action Taken: Other: [facility]    Travel Screening: Not Applicable     Date of Service:

## 2024-08-08 NOTE — TELEPHONE ENCOUNTER
Called pt back.  She has noticed visual hallucinations almost nightly for the last 4-6 weeks.      She reports after falling asleep, she wakes up around 11PM - 12 AM and she sees her children in the room. (she wakes up frequently at baseline)    Pt said she has tried talking to them or getting up to touch them.  Pt said she quickly is aware that it is not real.     Pt has been on Rytary 2 capsules x 3 times daily since March.  (6AM, 2 PM and 10 PM).     RN reassured pt that hallucinations can be side effect of the Rytary, so will see if provider/MTM recommend making any adjustments.     While on phone pt also wondering if Dr. Niño would be okay with her trying OTC supplement called neuriva to help with cognition.  Advised that we usually only recommend supplements back by research, but will have provider review and advise.     Pt verbalized understanding and acceptance of the plan. They had no further questions at this time.     Pam FLOR RN, BSN  [facility]

## 2024-08-19 ENCOUNTER — MYC MEDICAL ADVICE (OUTPATIENT)
Dept: FAMILY MEDICINE | Facility: CLINIC | Age: 64
End: 2024-08-19
Payer: COMMERCIAL

## 2024-08-19 ENCOUNTER — TELEPHONE (OUTPATIENT)
Dept: FAMILY MEDICINE | Facility: CLINIC | Age: 64
End: 2024-08-19
Payer: COMMERCIAL

## 2024-08-19 NOTE — TELEPHONE ENCOUNTER
Patient Quality Outreach    Patient is due for the following:   Colon Cancer Screening    Next Steps:   Colon Cancer Screening    Type of outreach:    Sent [employer] message.      Questions for provider review:    None           Joseph Mccullough Jr., MA  Chart routed to Care Team.

## 2024-08-21 ENCOUNTER — TELEPHONE (OUTPATIENT)
Dept: NEUROLOGY | Facility: CLINIC | Age: 64
End: 2024-08-21
Payer: COMMERCIAL

## 2024-08-21 NOTE — TELEPHONE ENCOUNTER
Relayed message to patient to contact PCP. Pt will call her primary clinic to address the antibiotic and Establish care with new PCP since her previous one left. No further question.     Ben 8/21/2024 10:55 AM

## 2024-08-21 NOTE — TELEPHONE ENCOUNTER
[facility]    Phone Message    May a detailed message be left on voicemail: yes     Reason for Call: Other: Pt is requesting an antibiotic prior to upcoming dental appointment. Pt advised by dental clinic that she would need an antibiotic. Pt unsure what provider to call and ask for this medication.     Please reach out to further advise    [id number] - [place], [place] - [address]         Action Taken: Other: Neurology    Travel Screening: Not Applicable

## 2024-08-22 NOTE — TELEPHONE ENCOUNTER
Please inquire whether Marlene would like to start Nuplazid.  We can send her information about the medication to review, if she would like.    J

## 2024-08-22 NOTE — TELEPHONE ENCOUNTER
Nuplazid dose would be 34mg daily.  And yes this would be in addition to her current treatment plan.    ANITA

## 2024-08-22 NOTE — TELEPHONE ENCOUNTER
Would Nuplazid be in addition to her current medication plan?     What would dosing/scheduling looks like?     I would want pt to have this information to make a decision.    Thanks!     Los Nance RN, BSN  [facility]

## 2024-08-22 NOTE — TELEPHONE ENCOUNTER
LM asking pt to call back, would like to discuss nuplazid with her further. This is being recommended as an add on to plan of care because she has been having more frequent visual hallucinations.     Recommended Nuplazid dose is 34mg daily.     Los Nance RN, BSN  [facility]

## 2024-08-23 DIAGNOSIS — G20.A1 PARKINSON'S DISEASE (H): ICD-10-CM

## 2024-08-23 RX ORDER — LEVODOPA AND CARBIDOPA 195; 48.75 MG/1; MG/1
3 CAPSULE, EXTENDED RELEASE ORAL 3 TIMES DAILY
Qty: 270 CAPSULE | Refills: 0 | Status: SHIPPED | OUTPATIENT
Start: 2024-08-23 | End: 2024-09-24

## 2024-08-23 NOTE — TELEPHONE ENCOUNTER
Pending Prescriptions:                       Disp   Refills    carbidopa-levodopa (RYTARY) 48. MG *270 ca*5            Sig: Take 3 capsules by mouth 3 times daily.    Last OV 2/8/24  Next OV 9/24/24    Last filled 6/3/24 270 capsule x 2 refills.     Will send in one time fill to last until appt.     Pam FLOR RN, BSN  [facility]

## 2024-08-23 NOTE — TELEPHONE ENCOUNTER
[facility]    Phone Message    May a detailed message be left on voicemail: yes     Reason for Call: Other: Pt called back to give the okay to start with the medication mentioned (Nuplazid 34). Pt would like to make sure that its safe to take with current health conditions.       [id number] - [place], [place] - [address]     Please reach out to further advise      Action Taken: Other: Neurology    Travel Screening: Not Applicable

## 2024-08-23 NOTE — TELEPHONE ENCOUNTER
[facility] Call [facility]    Phone Message    May a detailed message be left on voicemail: yes     Reason for Call:  patient is returning a  call to nurse, please call back [phone]     Action Taken: Other: [facility]    Travel Screening: Not Applicable     Date of Service:

## 2024-08-23 NOTE — TELEPHONE ENCOUNTER
Called pt and discussed adding on Nuplazid 34 mg daily to help with visual hallucinations.  Reviewed use and common side effects.     Pt said that her hallucinations have improved, and aren't happening every night, but sometimes they are pretty bad. It makes her nervous to sleep at night.      She would like to review the medication on her own first before starting.     She will call back clinic later today with a decision.     Pam FLOR RN, BSN  [facility]

## 2024-08-26 ENCOUNTER — TELEPHONE (OUTPATIENT)
Dept: NEUROLOGY | Facility: CLINIC | Age: 64
End: 2024-08-26
Payer: COMMERCIAL

## 2024-08-26 NOTE — TELEPHONE ENCOUNTER
"Called and spoke with patient giving assurances to safety of starting the medication, Nuplazid.    Per response from [facility] pharmacist.  \"No medication interactions with Nuplazid and the meds listed in her chart.  Thanks!  Brooklyn Marroquin, Pharm.D., MPH  Medication Therapy Management Pharmacist  [facility]\"        Patient states understanding and appreciates the call back, they will  medication at pharmacy.    Codey Veras RN, BSN  [facility]    "

## 2024-08-26 NOTE — TELEPHONE ENCOUNTER
PA Initiation    Medication: NUPLAZID 34 MG PO CAPS  Insurance Company: [employer] ([facility]) - Phone [phone] Fax [phone]  Pharmacy Filling the Rx: [facility] - [place], [place] - [address]  Filling Pharmacy Phone:    Filling Pharmacy Fax:    Start Date: 8/26/2024        Thank you,    Irina Rueda [facility]  Specialty Pharmacy Clinic Liaison - [facility] [address]  [address] [place] [address]  Ph: [phone] Fax: [phone]  [email or web address]

## 2024-09-03 NOTE — TELEPHONE ENCOUNTER
Prior Authorization Approval    Medication: NUPLAZID 34 MG PO CAPS  Authorization Effective Date: 8/26/2024  Authorization Expiration Date: 12/31/2024  Approved Dose/Quantity: 30 days  Reference #:     Insurance Company: [facility] ([facility]) - Phone [phone] Fax [phone]  Expected CoPay: $    CoPay Card Available:      Financial Assistance Needed:   Which Pharmacy is filling the prescription: [facility] - [place], [place] - [address] [place] [address]  Pharmacy Notified: Yes  Patient Notified: Yes        Thank you,    Irina Rueda [facility]  Specialty Pharmacy Clinic Liaison - [facility] [address]  [address] [place] [address]  Ph: [phone] Fax: [phone]  [email or web address]

## 2024-09-17 NOTE — TELEPHONE ENCOUNTER
Patient Quality Outreach    Patient is due for the following:   Colon Cancer Screening    Next Steps:   Colon Cancer Screening     Type of outreach:    Phone, left message for patient/parent to call back.    Next Steps:  Reach out within 90 days via Phone.    Max number of attempts reached: Yes. Will try again in 90 days if patient still on fail list.    Questions for provider review:    None           Joseph Mccullough Jr., MA

## 2024-09-24 ENCOUNTER — OFFICE VISIT (OUTPATIENT)
Dept: NEUROLOGY | Facility: CLINIC | Age: 64
End: 2024-09-24
Payer: COMMERCIAL

## 2024-09-24 VITALS
DIASTOLIC BLOOD PRESSURE: 72 MMHG | BODY MASS INDEX: 19.21 KG/M2 | HEART RATE: 63 BPM | WEIGHT: 112.8 LBS | SYSTOLIC BLOOD PRESSURE: 117 MMHG

## 2024-09-24 DIAGNOSIS — Z79.899 ENCOUNTER FOR LONG-TERM (CURRENT) USE OF MEDICATIONS: ICD-10-CM

## 2024-09-24 DIAGNOSIS — M54.16 LUMBAR RADICULOPATHY: ICD-10-CM

## 2024-09-24 DIAGNOSIS — G20.B2 PARKINSON'S DISEASE WITH DYSKINESIA AND FLUCTUATING MANIFESTATIONS (H): Primary | ICD-10-CM

## 2024-09-24 PROCEDURE — 99213 OFFICE O/P EST LOW 20 MIN: CPT | Performed by: PSYCHIATRY & NEUROLOGY

## 2024-09-24 RX ORDER — LEVODOPA AND CARBIDOPA 195; 48.75 MG/1; MG/1
3 CAPSULE, EXTENDED RELEASE ORAL 3 TIMES DAILY
Qty: 270 CAPSULE | Refills: 5 | Status: SHIPPED | OUTPATIENT
Start: 2024-09-24

## 2024-09-24 NOTE — PATIENT INSTRUCTIONS
Plan:  Continue the current Rytary dosing (carbidopa/levodopa): 3 capsules 3 times daily.  Try adding some vitamin E and C for skin/nail/hair health.  Lumbar spine MRI and referral to physical therapy.   Return to Neurology in 6 months. Please let us know if ant concerns arise in the meantime.

## 2024-09-24 NOTE — PROGRESS NOTES
ESTABLISHED PATIENT NEUROLOGY NOTE    DATE OF VISIT: 9/24/2024  MRN: [id number]  PATIENT NAME: Marlene Merida  YOB: 1960    Chief Complaint   Patient presents with    Parkinson     Good and bad days. Anxiety and stress will trigger it.      SUBJECTIVE:                                                      HISTORY OF PRESENT ILLNESS:  Marlene is here for follow up regarding Parkinson's disease.    History as previously documented by me (2.8.24):  She is and carbidopa/levodopa and entacapone.  I recommended melatonin for sleep when we met 6 months ago.  Ferritin level was adequate at 50.     Marlene says that she is doing well from a motor standpoint but then as we are talking she and her daughter revealed that she is having a lot of on/off periods.  She is quite fidgety and flails a lot per her daughter.  Other times her tremor breakthrough.  Marlene says she has a hard time trying to schedule all of her doses of carbidopa/levodopa around eating and just taking care of other things.  She cannot keep her weight up, has noticed some hair loss.     Marlene admits that she has not done physical therapy as recommended by me.  She is scheduled the appointments but does not follow through.  She says that her cyst on her foot is also getting a lot worse and admits she did not get into see podiatry for this.  She had 1 slip and fall on the ice but otherwise has not had any falls.     We decided to switch her carbidopa/levodopa formulation to Rytary and also get the opinion of the neurology pharmacist.  In the meantime she has also been started on Nuplazid.  I referred her to podiatry but I do not see any notes in the chart.                                  She says she has not been taking the Nuplazid. She says it was mainly for nightmares. She has been doing well (mind over matter).  She says that sometimes she forgets her mid-day dose.  This can occur on days when she is feeling good.  She typically takes her medication at  7 AM, 3 PM and 11 PM.  She is happy with the current formulation.  She has some Left lower back pain that comes and goes. She now feels like she is ready to do some physical therapy.  She wonders what is causing the pain.  No recent back injury.  She will be starting a new job at [employer] as a  and wants to be doing a little bit better so that she is able to handle the job from a physical standpoint.    Marlene tells me she is also having some issues with hair thinning still.  I did some labs after her previous visit because of this.  CBC, TSH, B12 and vitamin D were all unremarkable.  I did recommend she take some additional vitamin D.    CURRENT MEDICATIONS:   Current Outpatient Medications   Medication Sig Dispense Refill    ASPIRIN PO Take 162 mg by mouth daily as needed for moderate pain 2 tablets if needed      carbidopa-levodopa (RYTARY) 48. MG CPCR per CR capsule Take 3 capsules by mouth 3 times daily. 270 capsule 5    entacapone (COMTAN) 200 MG tablet Take 1 tablet (200 mg) by mouth 5 times daily Take with each dose of Sinemet. Ok to take additional tablet with as-needed extra carbi/levo dose (Patient not taking: Reported on 9/24/2024) 450 tablet 6    ibuprofen (ADVIL/MOTRIN) 200 MG tablet Take 200 mg by mouth as needed for mild pain (Patient not taking: Reported on 9/24/2024)      pimavanserin tartrate (NUPLAZID) 34 MG capsule Take 1 capsule (34 mg) by mouth daily. (Patient not taking: Reported on 9/24/2024) 30 capsule 3     No current facility-administered medications for this visit.       RECENT DIAGNOSTIC STUDIES:   Labs: No results found for any visits on 09/24/24.    REVIEW OF SYSTEMS:                                                      10-point review of systems is negative except as mentioned above in HPI.    EXAM:                                                      Physical Exam:   Vitals: /72   Pulse 63   Wt 51.2 kg (112 lb 12.8 oz)   LMP  (LMP Unknown)   BMI 19.21  kg/m    BMI= Body mass index is 19.21 kg/m .  GENERAL: NAD. Some dyskinesias.  HEENT: NC/AT.  CV: RRR. S1, S2.   NECK: No bruits.  PULM: Non-labored breathing.   Focused Neurologic:  MENTAL STATUS: Alert, attentive. Speech is fluent. Normal comprehension.  Normal concentration. Adequate fund of knowledge.   CRANIAL NERVES: Visual fields intact to confrontation. Pupils equally, round and reactive to light. Facial sensation and movement normal. EOM full. Hearing intact to conversation. Trapezius strength intact. Tongue midline.  MOTOR: 5/5 in proximal and distal muscle groups of upper and lower extremities with brief testing. Tone and bulk normal.   SENSATION: Normal light touch throughout.  COORDINATION: Normal finger nose finger.    STATION AND GAIT: Casual gait is normal other than some dyskinesias in the hands with walking.  Right hand-dominant.    ASSESSMENT and PLAN:                                                      Assessment:    ICD-10-CM    1. Parkinson's disease with dyskinesia and fluctuating manifestations (H)  G20.B2 carbidopa-levodopa (RYTARY) 48. MG CPCR per CR capsule     Physical Therapy  Referral      2. Encounter for long-term (current) use of medications  Z79.899       3. Lumbar radiculopathy  M54.16 MR Lumbar Spine w/o Contrast     Physical Therapy  Referral          Ms. Merida is a pleasant 64-year-old woman here for follow-up regarding Parkinson's.  She may be slightly overmedicated, with notable dyskinesias on exam today but Marlene is comfortable with the current Rytary regimen so we will not make any changes at this time.  For the lower back issue I am ordering a lumbar MRI and referring her to physical therapy.  We will plan to follow-up again in 6 months.  She expressed understanding and agreement with the plan.    Plan:  Continue the current Rytary dosing (carbidopa/levodopa): 3 capsules 3 times daily.  Try adding some vitamin E and C for skin/nail/hair  health.  Lumbar spine MRI and referral to physical therapy.   Return to Neurology in 6 months. Please let us know if ant concerns arise in the meantime.     Total Time: 25 minutes were spent with the patient and in chart review/documentation (as described above in Assessment and Plan)/coordinating the care on date of service.    Pat Niño MD  Neurology    Dragon software used in the dictation of this note.

## 2024-09-24 NOTE — NURSING NOTE
"Marlene Merida is a 64 year old female who presents for:  Chief Complaint   Patient presents with    Parkinson     Good and bad days. Anxiety and stress will trigger it.         Initial Vitals:  /72   Pulse 63   Wt 51.2 kg (112 lb 12.8 oz)   LMP  (LMP Unknown)   BMI 19.21 kg/m   Estimated body mass index is 19.21 kg/m  as calculated from the following:    Height as of 3/8/24: 1.632 m (5' 4.25\").    Weight as of this encounter: 51.2 kg (112 lb 12.8 oz).. Body surface area is 1.52 meters squared. BP completed using cuff size: wrist cuff    Ben LUPE Hess  "

## 2024-09-24 NOTE — LETTER
9/24/2024      Marlene Merida  [address]  [place] [address]      Dear Colleague,    Thank you for referring your patient, Marlene Merida, to the [facility] [place]. Please see a copy of my visit note below.    ESTABLISHED PATIENT NEUROLOGY NOTE    DATE OF VISIT: 9/24/2024  MRN: [id number]  PATIENT NAME: Marlene Merida  YOB: 1960    Chief Complaint   Patient presents with     Parkinson     Good and bad days. Anxiety and stress will trigger it.      SUBJECTIVE:                                                      HISTORY OF PRESENT ILLNESS:  Marlene is here for follow up regarding Parkinson's disease.    History as previously documented by me (2.8.24):  She is and carbidopa/levodopa and entacapone.  I recommended melatonin for sleep when we met 6 months ago.  Ferritin level was adequate at 50.     Marlene says that she is doing well from a motor standpoint but then as we are talking she and her daughter revealed that she is having a lot of on/off periods.  She is quite fidgety and flails a lot per her daughter.  Other times her tremor breakthrough.  Marlene says she has a hard time trying to schedule all of her doses of carbidopa/levodopa around eating and just taking care of other things.  She cannot keep her weight up, has noticed some hair loss.     Marlene admits that she has not done physical therapy as recommended by me.  She is scheduled the appointments but does not follow through.  She says that her cyst on her foot is also getting a lot worse and admits she did not get into see podiatry for this.  She had 1 slip and fall on the ice but otherwise has not had any falls.     We decided to switch her carbidopa/levodopa formulation to Rytary and also get the opinion of the neurology pharmacist.  In the meantime she has also been started on Nuplazid.  I referred her to podiatry but I do not see any notes in the chart.                                  She says she has not  been taking the Nuplazid. She says it was mainly for nightmares. She has been doing well (mind over matter).  She says that sometimes she forgets her mid-day dose.  This can occur on days when she is feeling good.  She typically takes her medication at 7 AM, 3 PM and 11 PM.  She is happy with the current formulation.  She has some Left lower back pain that comes and goes. She now feels like she is ready to do some physical therapy.  She wonders what is causing the pain.  No recent back injury.  She will be starting a new job at [employer] as a  and wants to be doing a little bit better so that she is able to handle the job from a physical standpoint.    Marlene tells me she is also having some issues with hair thinning still.  I did some labs after her previous visit because of this.  CBC, TSH, B12 and vitamin D were all unremarkable.  I did recommend she take some additional vitamin D.    CURRENT MEDICATIONS:   Current Outpatient Medications   Medication Sig Dispense Refill     ASPIRIN PO Take 162 mg by mouth daily as needed for moderate pain 2 tablets if needed       carbidopa-levodopa (RYTARY) 48. MG CPCR per CR capsule Take 3 capsules by mouth 3 times daily. 270 capsule 5     entacapone (COMTAN) 200 MG tablet Take 1 tablet (200 mg) by mouth 5 times daily Take with each dose of Sinemet. Ok to take additional tablet with as-needed extra carbi/levo dose (Patient not taking: Reported on 9/24/2024) 450 tablet 6     ibuprofen (ADVIL/MOTRIN) 200 MG tablet Take 200 mg by mouth as needed for mild pain (Patient not taking: Reported on 9/24/2024)       pimavanserin tartrate (NUPLAZID) 34 MG capsule Take 1 capsule (34 mg) by mouth daily. (Patient not taking: Reported on 9/24/2024) 30 capsule 3     No current facility-administered medications for this visit.       RECENT DIAGNOSTIC STUDIES:   Labs: No results found for any visits on 09/24/24.    REVIEW OF SYSTEMS:                                                       10-point review of systems is negative except as mentioned above in HPI.    EXAM:                                                      Physical Exam:   Vitals: /72   Pulse 63   Wt 51.2 kg (112 lb 12.8 oz)   LMP  (LMP Unknown)   BMI 19.21 kg/m    BMI= Body mass index is 19.21 kg/m .  GENERAL: NAD. Some dyskinesias.  HEENT: NC/AT.  CV: RRR. S1, S2.   NECK: No bruits.  PULM: Non-labored breathing.   Focused Neurologic:  MENTAL STATUS: Alert, attentive. Speech is fluent. Normal comprehension.  Normal concentration. Adequate fund of knowledge.   CRANIAL NERVES: Visual fields intact to confrontation. Pupils equally, round and reactive to light. Facial sensation and movement normal. EOM full. Hearing intact to conversation. Trapezius strength intact. Tongue midline.  MOTOR: 5/5 in proximal and distal muscle groups of upper and lower extremities with brief testing. Tone and bulk normal.   SENSATION: Normal light touch throughout.  COORDINATION: Normal finger nose finger.    STATION AND GAIT: Casual gait is normal other than some dyskinesias in the hands with walking.  Right hand-dominant.    ASSESSMENT and PLAN:                                                      Assessment:    ICD-10-CM    1. Parkinson's disease with dyskinesia and fluctuating manifestations (H)  G20.B2 carbidopa-levodopa (RYTARY) 48. MG CPCR per CR capsule     Physical Therapy  Referral      2. Encounter for long-term (current) use of medications  Z79.899       3. Lumbar radiculopathy  M54.16 MR Lumbar Spine w/o Contrast     Physical Therapy  Referral          Ms. Merida is a pleasant 64-year-old woman here for follow-up regarding Parkinson's.  She may be slightly overmedicated, with notable dyskinesias on exam today but Marlene is comfortable with the current Rytary regimen so we will not make any changes at this time.  For the lower back issue I am ordering a lumbar MRI and referring her to physical therapy.  We  will plan to follow-up again in 6 months.  She expressed understanding and agreement with the plan.    Plan:  Continue the current Rytary dosing (carbidopa/levodopa): 3 capsules 3 times daily.  Try adding some vitamin E and C for skin/nail/hair health.  Lumbar spine MRI and referral to physical therapy.   Return to Neurology in 6 months. Please let us know if ant concerns arise in the meantime.     Total Time: 25 minutes were spent with the patient and in chart review/documentation (as described above in Assessment and Plan)/coordinating the care on date of service.    Pat Niño MD  Neurology    Dragon software used in the dictation of this note.                    Again, thank you for allowing me to participate in the care of your patient.        Sincerely,        Pat Niño MD

## 2024-09-26 ENCOUNTER — TELEPHONE (OUTPATIENT)
Dept: NEUROLOGY | Facility: CLINIC | Age: 64
End: 2024-09-26
Payer: COMMERCIAL

## 2024-09-26 NOTE — TELEPHONE ENCOUNTER
Retail Pharmacy Prior Authorization Team   Phone: [phone]    Prior Authorization Retail Medication Request    Medication/Dose: carbidopa-levodopa (RYTARY) 48. MG CPCR per CR capsule  Diagnosis and ICD code (if different than what is on RX):    New/renewal/insurance change PA/secondary ins. PA:  Previously Tried and Failed:    Rationale:      Insurance   Primary:   Insurance ID:      Secondary (if applicable):  Insurance ID:      Pharmacy Information (if different than what is on RX)  Name:    Phone:    Fax:    Clinic Information  Preferred routing pool for dept communication:

## 2024-09-26 NOTE — TELEPHONE ENCOUNTER
Retail Pharmacy Prior Authorization Team   Phone: [phone]    PA Initiation    Medication: RYTARY 48. MG PO CPCR  Insurance Company: [employer] ([facility]) - Phone [phone] Fax [phone]  Pharmacy Filling the Rx: [facility][id number] [place], [place] - [facility] [address]  Filling Pharmacy Phone: [phone]  Filling Pharmacy Fax:    Start Date: 9/26/2024

## 2024-09-26 NOTE — TELEPHONE ENCOUNTER
Prior Authorization Approval    Medication: RYTARY 48. MG PO CPCR  Authorization Effective Date: 9/26/2024  Authorization Expiration Date: 12/31/2024  Insurance Company: Syd[employer] ([facility]) - Phone [phone] Fax [phone]  Which Pharmacy is filling the prescription: [id number] [address] [place] [address]  Pharmacy Notified: YES  Patient Notified: YES (faxed approval letter to pharmacy and notified patient via [employer] message)

## 2024-12-10 ENCOUNTER — HOSPITAL ENCOUNTER (EMERGENCY)
Facility: HOSPITAL | Age: 64
Discharge: HOME | End: 2024-12-10
Attending: EMERGENCY MEDICINE | Admitting: EMERGENCY MEDICINE
Payer: COMMERCIAL

## 2024-12-10 ENCOUNTER — APPOINTMENT (OUTPATIENT)
Dept: MRI IMAGING | Facility: HOSPITAL | Age: 64
End: 2024-12-10
Attending: EMERGENCY MEDICINE
Payer: COMMERCIAL

## 2024-12-10 VITALS
TEMPERATURE: 98.4 F | OXYGEN SATURATION: 98 % | WEIGHT: 118 LBS | RESPIRATION RATE: 18 BRPM | HEART RATE: 90 BPM | SYSTOLIC BLOOD PRESSURE: 129 MMHG | DIASTOLIC BLOOD PRESSURE: 72 MMHG | BODY MASS INDEX: 18.96 KG/M2 | HEIGHT: 66 IN

## 2024-12-10 DIAGNOSIS — R44.1 VISUAL HALLUCINATIONS: ICD-10-CM

## 2024-12-10 LAB
ALBUMIN UR-MCNC: 20 MG/DL
ANION GAP SERPL CALCULATED.3IONS-SCNC: 10 MMOL/L (ref 7–15)
APPEARANCE UR: ABNORMAL
BILIRUB UR QL STRIP: NEGATIVE
BUN SERPL-MCNC: 22.4 MG/DL (ref 8–23)
CALCIUM SERPL-MCNC: 8.7 MG/DL (ref 8.8–10.4)
CAOX CRY #/AREA URNS HPF: ABNORMAL /HPF
CHLORIDE SERPL-SCNC: 106 MMOL/L (ref 98–107)
COLOR UR AUTO: YELLOW
CREAT SERPL-MCNC: 0.53 MG/DL (ref 0.51–0.95)
EGFRCR SERPLBLD CKD-EPI 2021: >90 ML/MIN/1.73M2
ERYTHROCYTE [DISTWIDTH] IN BLOOD BY AUTOMATED COUNT: 13.6 % (ref 10–15)
GLUCOSE SERPL-MCNC: 148 MG/DL (ref 70–99)
GLUCOSE UR STRIP-MCNC: 100 MG/DL
HCO3 SERPL-SCNC: 26 MMOL/L (ref 22–29)
HCT VFR BLD AUTO: 42.6 % (ref 35–47)
HGB BLD-MCNC: 13.8 G/DL (ref 11.7–15.7)
HGB UR QL STRIP: NEGATIVE
HYALINE CASTS: 3 /LPF
KETONES UR STRIP-MCNC: 20 MG/DL
LEUKOCYTE ESTERASE UR QL STRIP: ABNORMAL
MCH RBC QN AUTO: 31.5 PG (ref 26.5–33)
MCHC RBC AUTO-ENTMCNC: 32.4 G/DL (ref 31.5–36.5)
MCV RBC AUTO: 97 FL (ref 78–100)
MUCOUS THREADS #/AREA URNS LPF: PRESENT /LPF
NITRATE UR QL: NEGATIVE
PH UR STRIP: 5.5 [PH] (ref 5–7)
PLATELET # BLD AUTO: 306 10E3/UL (ref 150–450)
POTASSIUM SERPL-SCNC: 3.9 MMOL/L (ref 3.4–5.3)
RBC # BLD AUTO: 4.38 10E6/UL (ref 3.8–5.2)
RBC URINE: 0 /HPF
SODIUM SERPL-SCNC: 142 MMOL/L (ref 135–145)
SP GR UR STRIP: 1.03 (ref 1–1.03)
SQUAMOUS EPITHELIAL: 2 /HPF
UROBILINOGEN UR STRIP-MCNC: 2 MG/DL
WBC # BLD AUTO: 8 10E3/UL (ref 4–11)
WBC URINE: 13 /HPF

## 2024-12-10 PROCEDURE — 70553 MRI BRAIN STEM W/O & W/DYE: CPT

## 2024-12-10 PROCEDURE — 255N000002 HC RX 255 OP 636: Performed by: EMERGENCY MEDICINE

## 2024-12-10 PROCEDURE — A9585 GADOBUTROL INJECTION: HCPCS | Performed by: EMERGENCY MEDICINE

## 2024-12-10 PROCEDURE — 36415 COLL VENOUS BLD VENIPUNCTURE: CPT | Performed by: EMERGENCY MEDICINE

## 2024-12-10 PROCEDURE — 81001 URINALYSIS AUTO W/SCOPE: CPT | Performed by: EMERGENCY MEDICINE

## 2024-12-10 PROCEDURE — 87086 URINE CULTURE/COLONY COUNT: CPT | Performed by: EMERGENCY MEDICINE

## 2024-12-10 PROCEDURE — 85014 HEMATOCRIT: CPT | Performed by: EMERGENCY MEDICINE

## 2024-12-10 PROCEDURE — 80048 BASIC METABOLIC PNL TOTAL CA: CPT | Performed by: EMERGENCY MEDICINE

## 2024-12-10 PROCEDURE — 99285 EMERGENCY DEPT VISIT HI MDM: CPT | Mod: 25

## 2024-12-10 RX ORDER — GADOBUTROL 604.72 MG/ML
5 INJECTION INTRAVENOUS ONCE
Status: COMPLETED | OUTPATIENT
Start: 2024-12-10 | End: 2024-12-10

## 2024-12-10 RX ORDER — QUETIAPINE FUMARATE 25 MG/1
25 TABLET, FILM COATED ORAL
Qty: 30 TABLET | Refills: 0 | Status: SHIPPED | OUTPATIENT
Start: 2024-12-10 | End: 2025-01-09

## 2024-12-10 RX ADMIN — GADOBUTROL 5 ML: 604.72 INJECTION INTRAVENOUS at 10:38

## 2024-12-10 ASSESSMENT — ACTIVITIES OF DAILY LIVING (ADL)
ADLS_ACUITY_SCORE: 41
ADLS_ACUITY_SCORE: 41

## 2024-12-10 NOTE — ED PROVIDER NOTES
EMERGENCY DEPARTMENT ENCOUnter      NAME: Marlene Merida  AGE: 64 year old female  YOB: 1960  MRN: [id number]  EVALUATION DATE & TIME: 12/10/2024  9:06 AM    PCP: Jeffry Warner    ED PROVIDER: Alvin Fisher DO      Chief Complaint   Patient presents with    Hallucinations         FINAL IMPRESSION:  1. Visual hallucinations          ED COURSE & MEDICAL DECISION MAKIN:12 AM I met the patient and performed my initial evaluation. We discussed the plan of care.  11:08 AM I paged Neurology.  11:32 AM I spoke with Dr. Alvares from Neurology.  11:50 AM I spoke with the patient about the plan for discharge. They were agreeable.    The patient presented to the emergency department today complaining about hallucinations.  These have been occurring over the past few months and occur almost exclusively in the evening.  She also has a history of Parkinson's.  She denies any other symptoms.  Laboratory testing along with MRI of the brain has been obtained today which are unremarkable.  I discussed her symptoms with neurology.  They feel that hallucinations are likely related to her Parkinson's and recommend starting the patient on low-dose Seroquel.  The patient is comfortable with this plan.  She will be following up with her neurologist on an outpatient basis.  She has been advised to return sooner for any worsening symptoms or other concerns.        Medical Decision Making  Obtained supplemental history:Supplemental history obtained?: No  Reviewed external records: External records reviewed?: Outpatient Record: [facility] [place] 2024  Care impacted by chronic illness:Other: Parkinson's  Did you consider but not order tests?: Work up considered but not performed and documented in chart, if applicable  Did you interpret images independently?: Independent interpretation of ECG and images noted in documentation, when applicable.  Consultation discussion with other  provider:Did you involve another provider (consultant, , pharmacy, etc.)?: I discussed the care with another health care provider, see documentation for details.  Discharge. I prescribed additional prescription strength medication(s) as charted. See documentation for any additional details.    MIPS: Not Applicable      At the conclusion of the encounter I discussed the results of all of the tests and the disposition. The questions were answered. The patient or family acknowledged understanding and was agreeable with the care plan.         MEDICATIONS GIVEN IN THE EMERGENCY:  Medications   gadobutrol (GADAVIST) injection 5 mL (5 mLs Intravenous $Given 12/10/24 1038)       NEW PRESCRIPTIONS STARTED AT TODAY'S ER VISIT  Discharge Medication List as of 12/10/2024 11:50 AM        START taking these medications    Details   QUEtiapine (SEROQUEL) 25 MG tablet Take 1 tablet (25 mg) by mouth daily (with dinner)., Disp-30 tablet, R-0, E-Prescribe                =================================================================    HPI        Marlene Merida is a 64 year old female with a pertinent history of Parkinson's who presents to the emergency department today due to concerns about hallucinations.  She states that she has been suffering from visual hallucinations over the past few months.  These occur almost exclusively at night.  She states that she sees people attacking her with frogs.  She denies any physical symptoms associated with this.  She has no current symptoms.  That she has not had any medication changes over the past few months.  No other current complaints.    Per chart review [facility] [place] 09/24/2024 Parkinson's with dyskinesia and fluctuation manifestations. Patient is comfortable with current Rytary regimen despite dyskinesias on exam. Ordering lumbar MRI for lower back issue and referring to physical therapy. Follow up in 6 months.        PAST MEDICAL HISTORY:  Past Medical  History:   Diagnosis Date    Parkinson's disease (H)        PAST SURGICAL HISTORY:  Past Surgical History:   Procedure Laterality Date    BREAST SURGERY      GYN SURGERY             CURRENT MEDICATIONS:    QUEtiapine (SEROQUEL) 25 MG tablet  ASPIRIN PO  carbidopa-levodopa (RYTARY) 48. MG CPCR per CR capsule  entacapone (COMTAN) 200 MG tablet  ibuprofen (ADVIL/MOTRIN) 200 MG tablet  pimavanserin tartrate (NUPLAZID) 34 MG capsule        ALLERGIES:  No Known Allergies    FAMILY HISTORY:  Family History   Problem Relation Age of Onset    Alzheimer Disease Mother     Hypertension Mother     Hypertension Father     Arthritis Father     Diabetes Paternal Grandmother     Diabetes Brother     Breast Cancer No family hx of        SOCIAL HISTORY:   Social History     Socioeconomic History    Marital status:    Tobacco Use    Smoking status: Never     Passive exposure: Never    Smokeless tobacco: Never   Vaping Use    Vaping status: Never Used   Substance and Sexual Activity    Alcohol use: Yes     Comment: 1-2 beers a month     Drug use: Never    Sexual activity: Not Currently   Other Topics Concern    Parent/sibling w/ CABG, MI or angioplasty before 65F 55M? No     Social Drivers of Health     Financial Resource Strain: Low Risk  (3/8/2024)    Financial Resource Strain     Within the past 12 months, have you or your family members you live with been unable to get utilities (heat, electricity) when it was really needed?: No   Food Insecurity: High Risk (3/8/2024)    Food Insecurity     Within the past 12 months, did you worry that your food would run out before you got money to buy more?: No     Within the past 12 months, did the food you bought just not last and you didn t have money to get more?: Yes   Transportation Needs: Low Risk  (3/8/2024)    Transportation Needs     Within the past 12 months, has lack of transportation kept you from medical appointments, getting your medicines, non-medical meetings or  appointments, work, or from getting things that you need?: No   Interpersonal Safety: Low Risk  (3/8/2024)    Interpersonal Safety     Do you feel physically and emotionally safe where you currently live?: Yes     Within the past 12 months, have you been hit, slapped, kicked or otherwise physically hurt by someone?: No     Within the past 12 months, have you been humiliated or emotionally abused in other ways by your partner or ex-partner?: No   Housing Stability: High Risk (3/8/2024)    Housing Stability     Do you have housing? : Yes     Are you worried about losing your housing?: Yes       VITALS:  No data found.      PHYSICAL EXAM    Constitutional:  Well developed, Well nourished,  HENT:  Normocephalic, Atraumatic, Oropharynx moist, Nose normal.   Eyes:  EOMI, Conjunctiva normal, No discharge.   Respiratory:  Normal breath sounds, No respiratory distress, No wheezing, No chest tenderness.   Cardiovascular:  Normal heart rate, Normal rhythm, No murmurs  GI:  Soft, No tenderness, No guarding, No CVA tenderness.   Musculoskeletal:  No tenderness to palpation or major deformities noted.   Extremities: No lower extremity edema.  Neurologic:  Alert & oriented x 3, No focal deficits noted.   Psychiatric:  Affect normal, Judgment normal, Mood normal.        LAB:  All pertinent labs reviewed and interpreted.  Results for orders placed or performed during the hospital encounter of 12/10/24              CBC with platelets   Result Value Ref Range    WBC Count 8.0 4.0 - 11.0 10e3/uL    RBC Count 4.38 3.80 - 5.20 10e6/uL    Hemoglobin 13.8 11.7 - 15.7 g/dL    Hematocrit 42.6 35.0 - 47.0 %    MCV 97 78 - 100 fL    MCH 31.5 26.5 - 33.0 pg    MCHC 32.4 31.5 - 36.5 g/dL    RDW 13.6 10.0 - 15.0 %    Platelet Count 306 150 - 450 10e3/uL   Basic metabolic panel   Result Value Ref Range    Sodium 142 135 - 145 mmol/L    Potassium 3.9 3.4 - 5.3 mmol/L    Chloride 106 98 - 107 mmol/L    Carbon Dioxide (CO2) 26 22 - 29 mmol/L    Anion  Gap 10 7 - 15 mmol/L    Urea Nitrogen 22.4 8.0 - 23.0 mg/dL    Creatinine 0.53 0.51 - 0.95 mg/dL    GFR Estimate >90 >60 mL/min/1.73m2    Calcium 8.7 (L) 8.8 - 10.4 mg/dL    Glucose 148 (H) 70 - 99 mg/dL   UA with Microscopic reflex to Culture    Specimen: Urine, Clean Catch   Result Value Ref Range    Color Urine Yellow Colorless, Straw, Light Yellow, Yellow    Appearance Urine Turbid (A) Clear    Glucose Urine 100 (A) Negative mg/dL    Bilirubin Urine Negative Negative    Ketones Urine 20 (A) Negative mg/dL    Specific Gravity Urine 1.030 1.003 - 1.035    Blood Urine Negative Negative    pH Urine 5.5 5.0 - 7.0    Protein Albumin Urine 20 (A) Negative mg/dL    Urobilinogen Urine 2.0 (A) <2.0 mg/dL    Nitrite Urine Negative Negative    Leukocyte Esterase Urine 500 Amaya/uL (A) Negative    Mucus Urine Present (A) None Seen /LPF    Calcium Oxalate Crystals Urine Few (A) None Seen /HPF    RBC Urine 0 <=2 /HPF    WBC Urine 13 (H) <=5 /HPF    Squamous Epithelials Urine 2 (H) <=1 /HPF    Hyaline Casts Urine 3 (H) <=2 /LPF   Urine Culture    Specimen: Urine, Clean Catch   Result Value Ref Range    Culture <10,000 CFU/mL Mixture of Urogenital Barbra        RADIOLOGY:  I have independently reviewed and interpreted the above imaging, pending the final radiology read.  MR Brain w/o & w Contrast   Final Result   IMPRESSION:   1.  No acute intracranial process.   2.  Generalized brain atrophy and presumed microvascular ischemic changes as detailed above.            I, Robin Ellis, am serving as a scribe to document services personally performed by Dr. Fisher based on my observation and the provider's statements to me. I, Alvin Fisher, DO attest that Robin Ellis is acting in a scribe capacity, has observed my performance of the services and has documented them in accordance with my direction.    Alvin Fisher, DO  Emergency Medicine  [facility] EMERGENCY DEPARTMENT  [address]  [address]  [phone]  Dept: [phone]     Alvin Fisher,   12/11/24 1457

## 2024-12-10 NOTE — ED TRIAGE NOTES
Patient arrives with [facility] - picked up from her apartment for hallucinations. Is on medication for Parkinson's and doctor changed her meds to long acting- that's when visual hallucinations started. Today she thought frogs were attacking her and went to her neighbors apartment for help     Triage Assessment (Adult)       Row Name 12/10/24 0910          Triage Assessment    Airway WDL WDL        Respiratory WDL    Respiratory WDL WDL        Skin Circulation/Temperature WDL    Skin Circulation/Temperature WDL WDL        Cardiac WDL    Cardiac WDL WDL        Peripheral/Neurovascular WDL    Peripheral Neurovascular WDL WDL        Cognitive/Neuro/Behavioral WDL    Cognitive/Neuro/Behavioral WDL X;mood/behavior  hallucinations     Level of Consciousness alert     Arousal Level opens eyes spontaneously     Orientation oriented x 4     Speech clear     Mood/Behavior calm;cooperative        Bhanu Coma Scale    Best Eye Response 4-->(E4) spontaneous     Best Motor Response 6-->(M6) obeys commands     Best Verbal Response 5-->(V5) oriented     [place] Coma Scale Score 15

## 2024-12-10 NOTE — DISCHARGE INSTRUCTIONS
Your symptoms today are likely related to your Parkinson's.  Take the prescribed medication as directed and follow-up closely with your neurologist.  Return to the emergency department for any worsening symptoms or other concerns.

## 2024-12-10 NOTE — ED NOTES
Bed: [facility]  Expected date:   Expected time:   Means of arrival: Ambulance  Comments:  Lewis: Hallucinations, 64F

## 2024-12-11 LAB — BACTERIA UR CULT: NORMAL

## 2025-01-03 ENCOUNTER — TELEPHONE (OUTPATIENT)
Dept: FAMILY MEDICINE | Facility: CLINIC | Age: 65
End: 2025-01-03

## 2025-01-03 ENCOUNTER — MYC MEDICAL ADVICE (OUTPATIENT)
Dept: FAMILY MEDICINE | Facility: CLINIC | Age: 65
End: 2025-01-03

## 2025-01-03 NOTE — TELEPHONE ENCOUNTER
Patient Quality Outreach    Patient is due for the following:   Annual Flu Vaccine    Action(s) Taken:   Schedule a nurse only visit for   Annual Flu Vaccine    Type of outreach:    Sent [employer] message.    Questions for provider review:    None           Joseph Mccullough Jr., MA  Chart routed to Care Team.

## 2025-01-08 ENCOUNTER — OFFICE VISIT (OUTPATIENT)
Dept: FAMILY MEDICINE | Facility: CLINIC | Age: 65
End: 2025-01-08
Payer: COMMERCIAL

## 2025-01-08 VITALS
WEIGHT: 112.19 LBS | HEIGHT: 66 IN | BODY MASS INDEX: 18.03 KG/M2 | HEART RATE: 85 BPM | DIASTOLIC BLOOD PRESSURE: 78 MMHG | TEMPERATURE: 98.6 F | RESPIRATION RATE: 12 BRPM | SYSTOLIC BLOOD PRESSURE: 139 MMHG | OXYGEN SATURATION: 98 %

## 2025-01-08 DIAGNOSIS — Z00.00 HEALTH CARE MAINTENANCE: ICD-10-CM

## 2025-01-08 DIAGNOSIS — R63.6 UNDERWEIGHT: ICD-10-CM

## 2025-01-08 DIAGNOSIS — Z99.89 WALKER AS AMBULATION AID: ICD-10-CM

## 2025-01-08 DIAGNOSIS — G20.A1 PARKINSON'S DISEASE, UNSPECIFIED WHETHER DYSKINESIA PRESENT, UNSPECIFIED WHETHER MANIFESTATIONS FLUCTUATE (H): Primary | ICD-10-CM

## 2025-01-08 DIAGNOSIS — R26.9 ABNORMALITY OF GAIT: ICD-10-CM

## 2025-01-08 PROCEDURE — 99214 OFFICE O/P EST MOD 30 MIN: CPT | Performed by: FAMILY MEDICINE

## 2025-01-08 PROCEDURE — G2211 COMPLEX E/M VISIT ADD ON: HCPCS | Performed by: FAMILY MEDICINE

## 2025-01-08 RX ORDER — ASCORBIC ACID 500 MG
500 TABLET ORAL DAILY
COMMUNITY
Start: 2025-01-03

## 2025-01-08 RX ORDER — OMEGA-3S/DHA/EPA/FISH OIL/D3 300MG-1000
400 CAPSULE ORAL DAILY
COMMUNITY
Start: 2025-01-02

## 2025-01-08 RX ORDER — TRAZODONE HYDROCHLORIDE 100 MG/1
100 TABLET ORAL AT BEDTIME
COMMUNITY
Start: 2025-01-02 | End: 2026-01-02

## 2025-01-08 NOTE — ASSESSMENT & PLAN NOTE
Discussed the importance of keeping weight in the normal range, will have care coordination assess if she needs help with cooking etc.

## 2025-01-08 NOTE — PROGRESS NOTES
Assessment & Plan  Underweight  Discussed the importance of keeping weight in the normal range, will have care coordination assess if she needs help with cooking etc.          Parkinson's disease, unspecified whether dyskinesia present, unspecified whether manifestations fluctuate (H)  Managed by neurology - Neurology Dr. Bridger Niño  But doesn't have pcp. Needs to establish care.     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 The psychiatric inpatient setting provided close nursing supervision and access to multiple treatment modalities and programming (group therapy, OT, one-to-one therapy.) Patient support systems such as family, , and other care providers were contacted as appropriate for collateral information and treatment planning.    1. Medication Trials and Changes: Rytary was dropped to 2 capsules PO TID and psychosis slowly resolved without worsening of physical symptoms of parkinsons. Trazodone and Melatonin were initiated for insomnia and titrated to efficacy. The pt denied side effects.   2. Legal Status: 72 hour hold.   3. Group Attendance: minimal  4. Level of cooperation and Medication adherence: cooperative  5. Change in psychiatric symptoms: Pt's hallucinations resolved. Her thought process became more linear and logical over the first few days of her stay. She developed insight into her abnormal behavior prior to admission. She was behaviorally controlled on the unit. Her sleep improved. Appetite and ADLs are appropriate.   6. Discharge planning and coordination of care: SW coordinated care with family. Pt to discharge to her daughter's house today. Daughter has taken away the pt's keys and car. We will set up a med box with pt's meds for 1 week prior to dc. Pt will only receive limited supply of  Rytary d/t stock issues and her prescription being too soon to fill (can refill on 1/13/25). Pt will follow up with PCP and Neurologist through [facility]     Orders:    Primary Care - Care Coordination Referral; Future    Walker as ambulation aid  Using a walker because she feels weak.          Abnormality of gait  She has parkinsons gait.          Health care maintenance  Needs establish care visit which she says she will schedule with me asap.                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  Follow Up ([facility] 12/26/24 and [facility] 01/02/25. Patient states feels 100% better but does have good and bad days.) and Medication Refill        1/8/2025    10:06 AM   Additional Questions   Roomed by sac   Accompanied by Daughter-Marilin         1/8/2025    10:06 AM   Patient Reported Additional Medications   Patient reports taking the following new medications no     Via the Health Maintenance questionnaire, the patient has reported the following services have been completed -Mammogram: keon 2023-03-20, this information has been sent to the abstraction team.    Hospital Follow-up Visit:    Hospital/Nursing Home/IP Rehab Facility:  [facility]  Date of Admission: 12/26/24  Date of Discharge: 01/02/25  Reason(s) for Admission: Psycosis  Was the patient in the ICU or did the patient experience delirium during hospitalization?  Yes       Do you have any other stressors you would like to discuss with your provider? No - but she does have parkinsons    Problems taking medications regularly:  YES-wasn't taking meds regularly when she was feeling good and was not taking at right time.  Medication changes since discharge: None  Problems adhering to non-medication therapy:  Has not heard from med nurse to help with setting up medications.    Summary of hospitalization:  [facility] hospital discharge  "summary reviewed  Diagnostic Tests/Treatments reviewed.  Follow up needed: needs to establish pcp.   Other Healthcare Providers Involved in Patient s Care:         Neurology Dr. Bridger Niño  Update since discharge: improved.    Plan of care communicated with patient and family         Objective    /78 (BP Location: Left arm, Patient Position: Sitting, Cuff Size: Adult Regular)   Pulse 85   Temp 98.6  F (37  C) (Oral)   Resp 12   Ht 1.676 m (5' 6\")   Wt 50.9 kg (112 lb 3 oz)   LMP  (LMP Unknown)   SpO2 98%   BMI 18.11 kg/m    Body mass index is 18.11 kg/m .  Physical Exam  Constitutional:       Appearance: Normal appearance.   HENT:      Head: Normocephalic and atraumatic.      Right Ear: Tympanic membrane, ear canal and external ear normal.      Left Ear: Tympanic membrane, ear canal and external ear normal.      Nose: Nose normal.      Mouth/Throat:      Mouth: Mucous membranes are moist.      Pharynx: Oropharynx is clear.   Eyes:      Conjunctiva/sclera: Conjunctivae normal.      Pupils: Pupils are equal, round, and reactive to light.   Cardiovascular:      Rate and Rhythm: Normal rate and regular rhythm.      Heart sounds: Normal heart sounds.   Pulmonary:      Effort: Pulmonary effort is normal.      Breath sounds: Normal breath sounds.   Abdominal:      General: Bowel sounds are normal.      Palpations: Abdomen is soft.   Musculoskeletal:         General: Normal range of motion.      Cervical back: Normal range of motion and neck supple.   Neurological:      General: No focal deficit present.      Mental Status: She is alert and oriented to person, place, and time.                Signed Electronically by: Анна Jennings MD    "

## 2025-01-08 NOTE — ASSESSMENT & PLAN NOTE
Managed by neurology - Neurology Dr. Bridger Niño  But doesn't have pcp. Needs to establish care.     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 The psychiatric inpatient setting provided close nursing supervision and access to multiple treatment modalities and programming (group therapy, OT, one-to-one therapy.) Patient support systems such as family, , and other care providers were contacted as appropriate for collateral information and treatment planning.    1. Medication Trials and Changes: Rytary was dropped to 2 capsules PO TID and psychosis slowly resolved without worsening of physical symptoms of parkinsons. Trazodone and Melatonin were initiated for insomnia and titrated to efficacy. The pt denied side effects.   2. Legal Status: 72 hour hold.   3. Group Attendance: minimal  4. Level of cooperation and Medication adherence: cooperative  5. Change in psychiatric symptoms: Pt's hallucinations resolved. Her thought process became more linear and logical over the first few days of her stay. She developed insight into her abnormal behavior prior to admission. She was behaviorally controlled on the unit. Her sleep improved. Appetite and ADLs are appropriate.   6. Discharge planning and coordination of care: SW coordinated care with family. Pt to discharge to her daughter's house today. Daughter has taken away the pt's keys and car. We will set up a med box with pt's meds for 1 week prior to dc. Pt will only receive limited supply of Rytary d/t stock issues and her prescription being too soon to fill (can refill on 1/13/25). Pt will follow up with PCP and Neurologist through [facility]     Orders:    Primary Care - Care Coordination Referral; Future

## 2025-01-12 ENCOUNTER — HOSPITAL ENCOUNTER (EMERGENCY)
Facility: HOSPITAL | Age: 65
Discharge: HOME | End: 2025-01-12
Attending: EMERGENCY MEDICINE | Admitting: EMERGENCY MEDICINE
Payer: COMMERCIAL

## 2025-01-12 ENCOUNTER — NURSE TRIAGE (OUTPATIENT)
Dept: NURSING | Facility: CLINIC | Age: 65
End: 2025-01-12
Payer: COMMERCIAL

## 2025-01-12 ENCOUNTER — APPOINTMENT (OUTPATIENT)
Dept: RADIOLOGY | Facility: HOSPITAL | Age: 65
End: 2025-01-12
Payer: COMMERCIAL

## 2025-01-12 VITALS
TEMPERATURE: 98.4 F | DIASTOLIC BLOOD PRESSURE: 71 MMHG | HEART RATE: 92 BPM | OXYGEN SATURATION: 96 % | WEIGHT: 108 LBS | HEIGHT: 66 IN | BODY MASS INDEX: 17.36 KG/M2 | RESPIRATION RATE: 16 BRPM | SYSTOLIC BLOOD PRESSURE: 150 MMHG

## 2025-01-12 DIAGNOSIS — M25.552 HIP PAIN, LEFT: ICD-10-CM

## 2025-01-12 DIAGNOSIS — M54.42 ACUTE LOW BACK PAIN WITH LEFT-SIDED SCIATICA, UNSPECIFIED BACK PAIN LATERALITY: ICD-10-CM

## 2025-01-12 PROCEDURE — 99284 EMERGENCY DEPT VISIT MOD MDM: CPT | Mod: 25

## 2025-01-12 PROCEDURE — 96372 THER/PROPH/DIAG INJ SC/IM: CPT

## 2025-01-12 PROCEDURE — 250N000011 HC RX IP 250 OP 636

## 2025-01-12 PROCEDURE — 73502 X-RAY EXAM HIP UNI 2-3 VIEWS: CPT

## 2025-01-12 RX ORDER — PREDNISONE 20 MG/1
TABLET ORAL
Qty: 10 TABLET | Refills: 0 | Status: SHIPPED | OUTPATIENT
Start: 2025-01-12 | End: 2025-01-13

## 2025-01-12 RX ORDER — KETOROLAC TROMETHAMINE 15 MG/ML
15 INJECTION, SOLUTION INTRAMUSCULAR; INTRAVENOUS ONCE
Status: COMPLETED | OUTPATIENT
Start: 2025-01-12 | End: 2025-01-12

## 2025-01-12 RX ADMIN — KETOROLAC TROMETHAMINE 15 MG: 15 INJECTION, SOLUTION INTRAMUSCULAR; INTRAVENOUS at 16:52

## 2025-01-12 ASSESSMENT — ACTIVITIES OF DAILY LIVING (ADL)
ADLS_ACUITY_SCORE: 43

## 2025-01-12 NOTE — TELEPHONE ENCOUNTER
"Pt calling that she has a hx of Parkinson.  Pt states she has an ache on her left hip for about one month it was off and on and starting today it is continous.  Pt states today pain is \"10\" on 0/10 pain scale.  Pt was not able to sleep last night, no change in position helps.  Pain does not radiate down the leg.  Pt states this pain has not change her walking.  Pt states pain is severe even with Tylenol/Ibuprofen.  Pt intends to go to [facility] [place], [place].  Malina Kaiser RN  [facility] Nurse Advisor    Reason for Disposition   [1] SEVERE pain (e.g., excruciating, unable to do any normal activities) AND [2] not improved after 2 hours of pain medicine    Additional Information   Negative: Looks like a broken bone or dislocated joint (e.g., crooked or deformed)   Negative: Sounds like a life-threatening emergency to the triager   Negative: Followed a hip injury   Negative: Leg pain is main symptom   Negative: Back pain radiating (shooting) into hip   Negative: [1] SEVERE pain (e.g., excruciating, unable to do any normal activities) AND [2] fever   Negative: Can't stand (bear weight) or walk   Negative: [1] Red area or streak AND [2] fever   Negative: Patient sounds very sick or weak to the triager    Protocols used: Hip Pain-A-AH    " ED Follow Up Call    2/15/2024    Patient: Norm Hopson Patient : 1964   MRN: [id number]  Reason for Admission: Finger Injury  Discharge Date: 24        Care Transitions ED Follow Up    Care Transitions Interventions    Physical Therapy: Completed      Specialty Service Referral: Completed    Schedule Follow Up Appointment with Physician: Completed  Do you have any ongoing symptoms?: Yes   Onset of Patient-reported symptoms: Yesterday   Patient-reported symptoms: Pain (Comment: stitches in finger)   Did you call your PCP prior to going to the ED?: Yes - Advised to go to the ED   Do you have a copy of your discharge instructions?: Yes   Do you understand what to report and when to return?: Yes   Are you following your discharge instructions?: Yes   Do you have all of your prescriptions and are they filled?: Yes   Were you discharged with any Home Care or Post Acute Services or do you currently have any active services?: Yes   Post Acute Services: Outpatient/Community Services (Comment: Established with PT)         Do you have any needs or concerns that I can assist you with?: No   Identified Barriers: Impairment          Needs to be reviewed by the provider   Additional needs identified to be addressed with provider No  none         Ambulatory Care Manager (ACM) contacted the patient by telephone to perform post ED visit assessment. Call within 2 business days of discharge: Yes. Verified name and  with patient as identifiers.     Assessment:  Patient reports he is doing well overall.  He stats he as at PT for recent hip replacement when he smashed his finger.  He was sent the the ED. They did clean and sutured it. His finger is splinted with e a dressing on it.  He as instructed not to remove it and has a follow up appt scheduled with surgeon on 24.  Reinforced Red Flag symptoms to report.  Pt has contact info for providers if needed.  He denies any immediate ACM needs and is agreeable to follow up
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
## 2025-01-12 NOTE — ED TRIAGE NOTES
Patient brought in by EMS [facility], c/o left hip pain, for 2 months, today it's acute on chronic, difficult to ambulate with her walker, son came over to help but couldn't ambulate her.  She took ASA 3 hours ago, and has tried Tylenol and Ibuprofen with no relief.  History of Parkinson's.      Triage Assessment (Adult)       Row Name 01/12/25 1547          Triage Assessment    Airway WDL WDL        Respiratory WDL    Respiratory WDL WDL        Skin Circulation/Temperature WDL    Skin Circulation/Temperature WDL WDL        Cardiac WDL    Cardiac WDL WDL        Peripheral/Neurovascular WDL    Peripheral Neurovascular WDL WDL        Cognitive/Neuro/Behavioral WDL    Cognitive/Neuro/Behavioral WDL WDL        Bhanu Coma Scale    Best Eye Response 4-->(E4) spontaneous     Best Motor Response 6-->(M6) obeys commands     Best Verbal Response 5-->(V5) oriented     [place] Coma Scale Score 15

## 2025-01-12 NOTE — ED NOTES
Bed: [facility]  Expected date:   Expected time:   Means of arrival:   Comments:  LV 64F left hip pain x 2 months

## 2025-01-12 NOTE — ED PROVIDER NOTES
Emergency Department Staff Note  I had a face to face encounter with this patient seen by the Advanced Practice Provider (SHAHRAM).  I personally made/approved the management plan and take responsibility for the patient management. I personally saw the patient and performed a substantive portion of the visit including all aspects of the medical decision making.      ED Course as of 01/12/25 1829   Sun Jan 12, 2025   5896 Patient is a pleasant 64-year-old female comes in today for evaluation of some left hip pain that started at 1 PM today.  She has had hip pain like this before and does not remember what is treated with in the past but thought maybe she got some physical therapy.  She said it was a really long time ago.  She is not a known diabetic.  The pain goes down the back of her leg and stops just below her knee.  When I push in the sciatic notch I can reproduce some of his symptoms.  I think this is likely some sciatica.  We could try some prednisone and see how she does with it.  I did tell her that there is some risk for psychosis and I know she has had psychosis before so we will go with only 40 mg and see if it makes a difference for her.  I wanted her to follow-up with her regular doctor this week and she plans to do so.  They could consider referral to physical therapy if needed.  She was in agreement with the plan and will be discharged home.  X-ray did not show an acute fracture.       Medical Decision Making    History:  Supplemental history from: None  External Record(s) reviewed: I reviewed the patient's psychiatry note on 1/2/2025.  At that time patient is being seen for psychosis related to her Parkinson's medications.  They thought it was related to her carbidopa levodopa.    Work Up:  Emergent/Severe conditions considered and evaluated for: Sciatica, hip fracture, hip dislocation  I independently reviewed and interpreted hip x-ray which showed no dislocation.  See full radiology report for all  details   in additional to work up documented, I considered the following work up: None  Medications given that require intensive monitoring for toxicity: None    External consultation:  Discussion of management with another provider: None    Complicating factors:  Care impacted by chronic illness: Parkinson's disease    Disposition considerations: Discharge  Prescriptions considered/prescribed: [facility] EMERGENCY DEPARTMENT  MD Natalie Miranda, Gabby Robertson MD  01/12/25 9142

## 2025-01-12 NOTE — DISCHARGE INSTRUCTIONS
Your imaging was normal. Take prednisone once a day for the next 5 days. You should call your primary care provider to schedule an appointment for this week to ensure things are improving. You can use tylenol and ibuprofen to help with pain as well;     You may take Tylenol 1000mg every 6 hours as needed, do not take more than 4000mg (4g) in 24 hours.  You may take ibuprofen 600mg every 8 hours as needed.     If you develop new injury, abdominal pain, bowel/bladder incontinence, numbness/tingling between your legs, or any new symptoms, please return to the Emergency Department for evaluation.

## 2025-01-13 RX ORDER — PREDNISONE 20 MG/1
TABLET ORAL
Qty: 10 TABLET | Refills: 0 | Status: SHIPPED | OUTPATIENT
Start: 2025-01-13

## 2025-01-13 NOTE — ED NOTES
I received a call from the patient.  She was seen yesterday for left hip and back pain.  She was prescribed prednisone.  On my review of the chart this was printed off.  Patient thought that this was sent to the pharmacy and when she is looking through her discharge paperwork she is unable to find the printed prescription.  I reviewed to the chart.  I sent the prednisone to her pharmacy for her.  She is going to pick it up today.     Krisit Disla PA-C  01/13/25 1525

## 2025-01-17 ENCOUNTER — MYC MEDICAL ADVICE (OUTPATIENT)
Dept: NEUROLOGY | Facility: CLINIC | Age: 65
End: 2025-01-17
Payer: COMMERCIAL

## 2025-01-22 ENCOUNTER — MYC MEDICAL ADVICE (OUTPATIENT)
Dept: FAMILY MEDICINE | Facility: CLINIC | Age: 65
End: 2025-01-22
Payer: COMMERCIAL

## 2025-01-22 DIAGNOSIS — G47.00 INSOMNIA, UNSPECIFIED TYPE: Primary | ICD-10-CM

## 2025-01-23 ENCOUNTER — PATIENT OUTREACH (OUTPATIENT)
Dept: NURSING | Facility: CLINIC | Age: 65
End: 2025-01-23
Payer: COMMERCIAL

## 2025-01-23 RX ORDER — TRAZODONE HYDROCHLORIDE 100 MG/1
100 TABLET ORAL AT BEDTIME
Qty: 90 TABLET | Refills: 0 | Status: SHIPPED | OUTPATIENT
Start: 2025-01-23

## 2025-01-23 ASSESSMENT — ACTIVITIES OF DAILY LIVING (ADL): DEPENDENT_IADLS:: CLEANING;MEDICATION MANAGEMENT;MONEY MANAGEMENT;TRANSPORTATION

## 2025-01-23 NOTE — PROGRESS NOTES
Clinic Care Coordination Contact  Clinic Care Coordination Contact  OUTREACH    Referral Information:  Referral Source: PCP    Primary Diagnosis: Psychosocial    Chief Complaint   Patient presents with    Clinic Care Coordination - Initial        Universal Utilization:      Utilization      No Show Count (past year)  2             ED Visits  3             Hospital Admissions  0                    Current as of: 1/23/2025  9:33 AM                Clinical Concerns:  Current Medical Concerns:    Patient Active Problem List   Diagnosis    Abnormality of gait    Pain in joint, pelvic region and thigh    Parkinson's disease (H)    Knee gives out, left    Walker as ambulation aid    Health care maintenance    Underweight        Current Behavioral Concerns: no current concerns      Education Provided to patient:     [facility]: https://[employer]/  [facility] is like a realtor for assisted living. They meet with you in person and help set up tours to the living situation you are looking for (assisted living, memory care, ect)   Call [phone]    [employer] Bus Loop: https://[email or web address]/resources-wellness/uxqnwejui-cqyjgh-tohxjffrw-loop/    [employer] Activities: https://[employer].org/thrive/  [employer] offers daily activities, rides, home services, and more. There is a membership feel to access these services; with 3 different levels of service available. For more information review the 'membership information' tab as well as the 'thrive events' to get a sense of the services available. If you would like to speak with someone about the program you can call [phone].    [facility]  :     [facility] Friendly [place] - https://www.[facility].org/services/older-adults/lss-friendly-helper   LSS friendly helpers are trained and provide assistance for members, age 65 years and older, through their supplemental benefits.   [phone]     [facility] Neighbor to Neighbor   - https://www.[facility].org/services/older-adults/-services/neighbor-to-neighbor   Our dedicated, trained Neighbor to Neighbor  volunteers offer assistance with daily activities, transportation for appointments, and an opportunity for meaningful friendship. This service is offered and available for purchase [facility].   [phone]     [facility] Respite Care - https://www.[facility].org/services/older-adults/caregiver-support/in-home-respite-care   [facility] Caregiver Support & Respite Services provides a few hours of support for an older adult (60 years of age and older) in their own home each week so family members can take some personal time.    Ary Hutchins   [phone]     [employer]:     [employer] In-Home Respite - https://[employer].org/breaks-for-caregivers.html   Regularly scheduled visits are provided at your home by volunteers who are screened, trained and carefully matched and monitored to meet your family's needs. It is typical for the volunteer to come weekly for a maximum of 4 hours respite. Lengths of respite visits will be determined by the caregiver and Caregiving & Aging .   [phone]      [employer] Day Out! - https://www.[employer].org/breaks-for-caregivers.html  Participants enjoy group discussions, creative projects, music, exercise, games, and fun, Volunteers provide personalized attention to each participant.  Weekly on Fridays starting at 10 am   [phone]      [employer] Night Out! - https://www.[email or web address]/breaks-for-caregivers.html  Participants will enjoy various activities and will be supervised by our volunteers and staff.  Monthly on the 4th Tuesday, 5:30pm to 7:30pm.   [phone]     [employer] also offers caregiver consultation to help coordinate services, solve problems, and plan ahead. Make an appointment by calling [phone]. They offer support groups, empowerment services and more :  https://www.familymeans.org/help-for-caregivers/    Meals on Wheels: Meals on Wheels [place]  Use the link above to sign up for meals from a local provider.     Health Care Directive: [email or web address].[email or web address]/resources/patients-and-visitors/honoring-choices  Find more information about completing a health care directive at the link above.        Pain  Pain (GOAL):: Yes  Type: Acute on Chronic  Location of chronic pain:: hip, lower back pain  Radiating: Yes  Location pain radiates to: it will radiate down the leg  Progression: Intermittent  Description of pain: Aching, Sharp, Throbbing  Chronic pain severity:: 8  Limitation of routine activities due to chronic pain:: Yes  Description: Unable to perform most daily activities (chores, hobbies, social activities, driving)  Alleviating Factors: Procedures or Injections, Pain Medication, Other  Aggravating Factors: Other  Health Maintenance Reviewed: Due/Overdue   Health Maintenance Due   Topic Date Due    DEXA  Never done    COLORECTAL CANCER SCREENING  Never done    HEPATITIS C SCREENING  Never done    DTAP/TDAP/TD IMMUNIZATION (1 - Tdap) 01/02/1998    LIPID  Never done    Pneumococcal Vaccine: 50+ Years (1 of 1 - PCV) Never done    ZOSTER IMMUNIZATION (1 of 2) Never done    MEDICARE ANNUAL WELLNESS VISIT  Never done    INFLUENZA VACCINE (1) Never done    COVID-19 Vaccine (3 - 2024-25 season) 09/01/2024      Clinical Pathway: None    Medication Management:  Medication review status: Medications reviewed and no changes reported per patient.             Functional Status:  Dependent ADLs:: Ambulation-walker, Dressing, Positioning, Transfers, Toileting  Dependent IADLs:: Cleaning, Medication Management, Money Management, Transportation  Bed or wheelchair confined:: No  Mobility Status: Independent w/Device  Fallen 2 or more times in the past year?: No  Any fall with injury in the past year?: No    Living Situation:  Current living arrangement:: I live alone  Type of  residence:: Apartment    Lifestyle & Psychosocial Needs:    Social Drivers of Health     Food Insecurity: High Risk (1/13/2025)    Food Insecurity     Within the past 12 months, did you worry that your food would run out before you got money to buy more?: Yes     Within the past 12 months, did the food you bought just not last and you didn t have money to get more?: Yes   Depression: Not at risk (1/8/2025)    PHQ-2     PHQ-2 Score: 0   Housing Stability: Low Risk  (1/13/2025)    Housing Stability     Do you have housing? : Yes     Are you worried about losing your housing?: No   Tobacco Use: Low Risk  (1/8/2025)    Patient History     Smoking Tobacco Use: Never     Smokeless Tobacco Use: Never     Passive Exposure: Never   Financial Resource Strain: Low Risk  (1/13/2025)    Financial Resource Strain     Within the past 12 months, have you or your family members you live with been unable to get utilities (heat, electricity) when it was really needed?: No   Alcohol Use: Not At Risk (1/13/2025)    AUDIT-C     Frequency of Alcohol Consumption: Monthly or less     Average Number of Drinks: 1 or 2     Frequency of Binge Drinking: Never   Transportation Needs: Low Risk  (1/13/2025)    Transportation Needs     Within the past 12 months, has lack of transportation kept you from medical appointments, getting your medicines, non-medical meetings or appointments, work, or from getting things that you need?: No   Physical Activity: Inactive (1/13/2025)    Exercise Vital Sign     Days of Exercise per Week: 0 days     Minutes of Exercise per Session: 0 min   Interpersonal Safety: Patient Declined (12/27/2024)    Received from [facility]    Humiliation, Afraid, Rape, and Kick questionnaire     Fear of Current or Ex-Partner: Patient declined     Emotionally Abused: Patient declined     Physically Abused: Patient declined     Sexually Abused: Patient declined   Stress: Stress Concern Present (1/13/2025)    [employer] [place] of  Occupational Health - Occupational Stress Questionnaire     Feeling of Stress : To some extent   Social Connections: Moderately Isolated (1/13/2025)    Social Connection and Isolation Panel [NHANES]     Frequency of Communication with Friends and Family: Three times a week     Frequency of Social Gatherings with Friends and Family: Once a week     Attends [employer] Services: 1 to 4 times per year     Active Member of Clubs or Organizations: No     Attends Club or Organization Meetings: Never     Marital Status:    Health Literacy: Not on file     Diet:: Regular  Inadequate nutrition (GOAL):: Yes  Tube Feeding: No  Inadequate activity/exercise (GOAL):: Yes  Significant changes in sleep pattern (GOAL): Yes  Transportation means:: Family     Methodist or spiritual beliefs that impact treatment:: No  Mental health DX:: Yes  Mental health DX how managed:: Medication  Informal Support system:: Children, Friends             Resources and Interventions:  Current Resources:      Community Resources: None  Supplies Currently Used at Home: Nutritional Supplements  Equipment Currently Used at Home: grab bar, toilet, grab bar, tub/shower, walker, rolling  Employment Status: retired         Advance Care Plan/Directive  Advanced Care Plans/Directives on file:: No    Referrals Placed: Assisted Living, Community Resources, Transportation, Meals on Wheels         Care Plan:  Care Plan: Social Support       Problem: Inadequate social support       Goal: Improve social support system       Start Date: 1/23/2025    Note:     Goal Statement: I will continue to take steps over the next three month(s) to identify and establish with in home/community supports which will allow me to maintain my current level of independence.  Barriers: application process, limited transportation   Strengths: family support  Patient expressed understanding of goal: yes    Action steps to achieve this goal:  My family will review resources and supports  sent to me via E-mail: community thread bus loop and activities, LSS and family means, meals on wheels, [facility]  I will outreach to supports and consider establishing with ones I might find beneficial.  I will continue to outreach to care coordination as needed for additional resources or supports.                                Patient/Caregiver understanding: Pt reports understanding and denies any additional questions or concerns at this times. SW CC engaged in [employer]ET communication during encounter.     Outreach Frequency: monthly, more frequently as needed  Future Appointments                In 1 month Анна Jennings MD [facility] STWT    In 2 months Pat Leyva MD [facility] Neurology [facility] MPLW            Plan: CC completed enrollment to [facility]. SWCC completed handoff to [facility]. SWCC provided resources via phone and email. Writer communicated the risks of unencrypted electronic communication and the patient and/or patient representative has agreed to accept the risks and receive unencrypted communication related to the information or resources we have discussed. We reviewed that no PHI will be included.  Email address verified with the patient.  [facility] will complete outreach in about 4 week. SWCC will complete chart review in about 6 weeks. SWCC reviewed care coordination role and availability with pt's daughter Bibiana. SWCC discussed resources and supports available. Resources discussed: [facility], meals on wheels, community thread bus look and activities, LSS, Family Means, health care directive.     Pt's daughter Bibiana noted that pt lives alone but is having a hard time managing - hard time getting up out of chairs, lonely feeling, ect. Resources provided for community activities through community thread (and bus loop). Bibiana will review with pt and see if pt is interested in any of these. Options for services  through LSS and family means also provided (respite and  services). Pt's son currently stops by with groceries, helps with meds, ect. However, pt might be interested in meals on wheels - resources provided. Family will look into health care directive information and consider establishing with [facility] for looking into assisted living options. This was noted that pt would like to be at home as long as possible however family would like to be prepared for the future when pt is not able to remain at home.

## 2025-01-23 NOTE — TELEPHONE ENCOUNTER
Hospital follow-up appointment on 1/8/25      From 12/27 hospital discharge summary-   Current Discharge Medication List     START taking these medications   Details   melatonin 3 MG tablet Take 2 Tablets (6 mg) by mouth daily. Indications: Trouble Sleeping  Qty: 60 Tablet, Refills: 0     traZODone (DESYREL) 100 MG tablet Take 1 Tablet (100 mg) by mouth daily at bedtime. Indications: Trouble Sleeping  Qty: 30 Tablet, Refills: 0

## 2025-01-25 ENCOUNTER — HOSPITAL ENCOUNTER (OUTPATIENT)
Facility: HOSPITAL | Age: 65
Setting detail: OBSERVATION
LOS: 1 days | Discharge: NURSING, REHAB OR LONG-TERM CARE | End: 2025-01-30
Attending: STUDENT IN AN ORGANIZED HEALTH CARE EDUCATION/TRAINING PROGRAM
Payer: COMMERCIAL

## 2025-01-25 DIAGNOSIS — M54.41 ACUTE BILATERAL LOW BACK PAIN WITH BILATERAL SCIATICA: ICD-10-CM

## 2025-01-25 DIAGNOSIS — K59.00 CONSTIPATION, UNSPECIFIED CONSTIPATION TYPE: ICD-10-CM

## 2025-01-25 DIAGNOSIS — M54.42 ACUTE BILATERAL LOW BACK PAIN WITH BILATERAL SCIATICA: ICD-10-CM

## 2025-01-25 DIAGNOSIS — G20.A1 PARKINSON'S DISEASE, UNSPECIFIED WHETHER DYSKINESIA PRESENT, UNSPECIFIED WHETHER MANIFESTATIONS FLUCTUATE (H): Primary | ICD-10-CM

## 2025-01-25 DIAGNOSIS — G47.00 INSOMNIA, UNSPECIFIED TYPE: ICD-10-CM

## 2025-01-25 LAB
ALBUMIN SERPL BCG-MCNC: 4.1 G/DL (ref 3.5–5.2)
ALBUMIN UR-MCNC: NEGATIVE MG/DL
ALP SERPL-CCNC: 109 U/L (ref 40–150)
ALT SERPL W P-5'-P-CCNC: 27 U/L (ref 0–50)
ANION GAP SERPL CALCULATED.3IONS-SCNC: 9 MMOL/L (ref 7–15)
APPEARANCE UR: CLEAR
AST SERPL W P-5'-P-CCNC: 35 U/L (ref 0–45)
BACTERIA #/AREA URNS HPF: ABNORMAL /HPF
BASOPHILS # BLD AUTO: 0.1 10E3/UL (ref 0–0.2)
BASOPHILS NFR BLD AUTO: 1 %
BILIRUB SERPL-MCNC: 0.4 MG/DL
BILIRUB UR QL STRIP: NEGATIVE
BUN SERPL-MCNC: 27.5 MG/DL (ref 8–23)
CALCIUM SERPL-MCNC: 9.7 MG/DL (ref 8.8–10.4)
CHLORIDE SERPL-SCNC: 109 MMOL/L (ref 98–107)
COLOR UR AUTO: ABNORMAL
CREAT SERPL-MCNC: 0.5 MG/DL (ref 0.51–0.95)
EGFRCR SERPLBLD CKD-EPI 2021: >90 ML/MIN/1.73M2
EOSINOPHIL # BLD AUTO: 0.1 10E3/UL (ref 0–0.7)
EOSINOPHIL NFR BLD AUTO: 1 %
ERYTHROCYTE [DISTWIDTH] IN BLOOD BY AUTOMATED COUNT: 14.2 % (ref 10–15)
GLUCOSE SERPL-MCNC: 106 MG/DL (ref 70–99)
GLUCOSE UR STRIP-MCNC: NEGATIVE MG/DL
HCO3 SERPL-SCNC: 25 MMOL/L (ref 22–29)
HCT VFR BLD AUTO: 41.9 % (ref 35–47)
HGB BLD-MCNC: 14.2 G/DL (ref 11.7–15.7)
HGB UR QL STRIP: NEGATIVE
IMM GRANULOCYTES # BLD: 0 10E3/UL
IMM GRANULOCYTES NFR BLD: 0 %
KETONES UR STRIP-MCNC: NEGATIVE MG/DL
LEUKOCYTE ESTERASE UR QL STRIP: ABNORMAL
LYMPHOCYTES # BLD AUTO: 1.6 10E3/UL (ref 0.8–5.3)
LYMPHOCYTES NFR BLD AUTO: 17 %
MCH RBC QN AUTO: 32.2 PG (ref 26.5–33)
MCHC RBC AUTO-ENTMCNC: 33.9 G/DL (ref 31.5–36.5)
MCV RBC AUTO: 95 FL (ref 78–100)
MONOCYTES # BLD AUTO: 0.7 10E3/UL (ref 0–1.3)
MONOCYTES NFR BLD AUTO: 7 %
MUCOUS THREADS #/AREA URNS LPF: PRESENT /LPF
NEUTROPHILS # BLD AUTO: 7.2 10E3/UL (ref 1.6–8.3)
NEUTROPHILS NFR BLD AUTO: 74 %
NITRATE UR QL: NEGATIVE
NRBC # BLD AUTO: 0 10E3/UL
NRBC BLD AUTO-RTO: 0 /100
PH UR STRIP: 7 [PH] (ref 5–7)
PLATELET # BLD AUTO: 326 10E3/UL (ref 150–450)
POTASSIUM SERPL-SCNC: 4.2 MMOL/L (ref 3.4–5.3)
PROT SERPL-MCNC: 7 G/DL (ref 6.4–8.3)
RBC # BLD AUTO: 4.41 10E6/UL (ref 3.8–5.2)
RBC URINE: <1 /HPF
SODIUM SERPL-SCNC: 143 MMOL/L (ref 135–145)
SP GR UR STRIP: 1.02 (ref 1–1.03)
SQUAMOUS EPITHELIAL: <1 /HPF
UROBILINOGEN UR STRIP-MCNC: <2 MG/DL
WBC # BLD AUTO: 9.7 10E3/UL (ref 4–11)
WBC CLUMPS #/AREA URNS HPF: PRESENT /HPF
WBC URINE: 10 /HPF

## 2025-01-25 PROCEDURE — 36415 COLL VENOUS BLD VENIPUNCTURE: CPT | Performed by: EMERGENCY MEDICINE

## 2025-01-25 PROCEDURE — 85004 AUTOMATED DIFF WBC COUNT: CPT | Performed by: EMERGENCY MEDICINE

## 2025-01-25 PROCEDURE — 82565 ASSAY OF CREATININE: CPT | Performed by: EMERGENCY MEDICINE

## 2025-01-25 PROCEDURE — 81003 URINALYSIS AUTO W/O SCOPE: CPT | Performed by: STUDENT IN AN ORGANIZED HEALTH CARE EDUCATION/TRAINING PROGRAM

## 2025-01-25 PROCEDURE — 84443 ASSAY THYROID STIM HORMONE: CPT | Performed by: HOSPITALIST

## 2025-01-25 PROCEDURE — 258N000003 HC RX IP 258 OP 636: Performed by: EMERGENCY MEDICINE

## 2025-01-25 PROCEDURE — 96360 HYDRATION IV INFUSION INIT: CPT

## 2025-01-25 PROCEDURE — 86140 C-REACTIVE PROTEIN: CPT | Performed by: HOSPITALIST

## 2025-01-25 PROCEDURE — 85014 HEMATOCRIT: CPT | Performed by: EMERGENCY MEDICINE

## 2025-01-25 PROCEDURE — 99285 EMERGENCY DEPT VISIT HI MDM: CPT | Mod: 25

## 2025-01-25 PROCEDURE — 250N000013 HC RX MED GY IP 250 OP 250 PS 637

## 2025-01-25 PROCEDURE — 81001 URINALYSIS AUTO W/SCOPE: CPT | Performed by: STUDENT IN AN ORGANIZED HEALTH CARE EDUCATION/TRAINING PROGRAM

## 2025-01-25 PROCEDURE — 84145 PROCALCITONIN (PCT): CPT | Performed by: HOSPITALIST

## 2025-01-25 PROCEDURE — 87086 URINE CULTURE/COLONY COUNT: CPT | Performed by: STUDENT IN AN ORGANIZED HEALTH CARE EDUCATION/TRAINING PROGRAM

## 2025-01-25 PROCEDURE — 84155 ASSAY OF PROTEIN SERUM: CPT | Performed by: EMERGENCY MEDICINE

## 2025-01-25 RX ORDER — METHYLPREDNISOLONE SODIUM SUCCINATE 125 MG/2ML
125 INJECTION INTRAMUSCULAR; INTRAVENOUS ONCE
Status: COMPLETED | OUTPATIENT
Start: 2025-01-25 | End: 2025-01-26

## 2025-01-25 RX ORDER — KETOROLAC TROMETHAMINE 15 MG/ML
15 INJECTION, SOLUTION INTRAMUSCULAR; INTRAVENOUS ONCE
Status: DISCONTINUED | OUTPATIENT
Start: 2025-01-25 | End: 2025-01-25

## 2025-01-25 RX ORDER — IBUPROFEN 600 MG/1
600 TABLET, FILM COATED ORAL ONCE
Status: COMPLETED | OUTPATIENT
Start: 2025-01-25 | End: 2025-01-25

## 2025-01-25 RX ADMIN — IBUPROFEN 600 MG: 600 TABLET, FILM COATED ORAL at 22:38

## 2025-01-25 RX ADMIN — SODIUM CHLORIDE 1000 ML: 9 INJECTION, SOLUTION INTRAVENOUS at 22:33

## 2025-01-25 ASSESSMENT — COLUMBIA-SUICIDE SEVERITY RATING SCALE - C-SSRS
1. IN THE PAST MONTH, HAVE YOU WISHED YOU WERE DEAD OR WISHED YOU COULD GO TO SLEEP AND NOT WAKE UP?: NO
2. HAVE YOU ACTUALLY HAD ANY THOUGHTS OF KILLING YOURSELF IN THE PAST MONTH?: NO
6. HAVE YOU EVER DONE ANYTHING, STARTED TO DO ANYTHING, OR PREPARED TO DO ANYTHING TO END YOUR LIFE?: NO

## 2025-01-26 ENCOUNTER — APPOINTMENT (OUTPATIENT)
Dept: PHYSICAL THERAPY | Facility: HOSPITAL | Age: 65
End: 2025-01-26
Attending: HOSPITALIST
Payer: COMMERCIAL

## 2025-01-26 ENCOUNTER — APPOINTMENT (OUTPATIENT)
Dept: MRI IMAGING | Facility: HOSPITAL | Age: 65
End: 2025-01-26
Attending: STUDENT IN AN ORGANIZED HEALTH CARE EDUCATION/TRAINING PROGRAM
Payer: COMMERCIAL

## 2025-01-26 ENCOUNTER — APPOINTMENT (OUTPATIENT)
Dept: OCCUPATIONAL THERAPY | Facility: HOSPITAL | Age: 65
End: 2025-01-26
Attending: HOSPITALIST
Payer: COMMERCIAL

## 2025-01-26 ENCOUNTER — APPOINTMENT (OUTPATIENT)
Dept: RADIOLOGY | Facility: HOSPITAL | Age: 65
End: 2025-01-26
Attending: HOSPITALIST
Payer: COMMERCIAL

## 2025-01-26 PROBLEM — M54.41 ACUTE BILATERAL LOW BACK PAIN WITH BILATERAL SCIATICA: Status: ACTIVE | Noted: 2025-01-26

## 2025-01-26 PROBLEM — N39.0 UTI (URINARY TRACT INFECTION): Status: ACTIVE | Noted: 2025-01-26

## 2025-01-26 PROBLEM — M54.42 ACUTE BILATERAL LOW BACK PAIN WITH BILATERAL SCIATICA: Status: ACTIVE | Noted: 2025-01-26

## 2025-01-26 PROBLEM — G20.A1 PARKINSON'S DISEASE (H): Status: ACTIVE | Noted: 2020-07-28

## 2025-01-26 LAB
AMMONIA PLAS-SCNC: 27 UMOL/L (ref 11–51)
ANION GAP SERPL CALCULATED.3IONS-SCNC: 13 MMOL/L (ref 7–15)
BUN SERPL-MCNC: 22.1 MG/DL (ref 8–23)
CALCIUM SERPL-MCNC: 9 MG/DL (ref 8.8–10.4)
CHLORIDE SERPL-SCNC: 108 MMOL/L (ref 98–107)
CREAT SERPL-MCNC: 0.47 MG/DL (ref 0.51–0.95)
CRP SERPL-MCNC: <3 MG/L
EGFRCR SERPLBLD CKD-EPI 2021: >90 ML/MIN/1.73M2
ERYTHROCYTE [DISTWIDTH] IN BLOOD BY AUTOMATED COUNT: 14.1 % (ref 10–15)
GLUCOSE SERPL-MCNC: 157 MG/DL (ref 70–99)
HCO3 SERPL-SCNC: 19 MMOL/L (ref 22–29)
HCT VFR BLD AUTO: 42.8 % (ref 35–47)
HGB BLD-MCNC: 14.5 G/DL (ref 11.7–15.7)
MCH RBC QN AUTO: 32.9 PG (ref 26.5–33)
MCHC RBC AUTO-ENTMCNC: 33.9 G/DL (ref 31.5–36.5)
MCV RBC AUTO: 97 FL (ref 78–100)
PLATELET # BLD AUTO: 306 10E3/UL (ref 150–450)
POTASSIUM SERPL-SCNC: 4 MMOL/L (ref 3.4–5.3)
PROCALCITONIN SERPL IA-MCNC: 0.04 NG/ML
RBC # BLD AUTO: 4.41 10E6/UL (ref 3.8–5.2)
SODIUM SERPL-SCNC: 140 MMOL/L (ref 135–145)
TSH SERPL DL<=0.005 MIU/L-ACNC: 2.98 UIU/ML (ref 0.3–4.2)
WBC # BLD AUTO: 7.1 10E3/UL (ref 4–11)

## 2025-01-26 PROCEDURE — 258N000003 HC RX IP 258 OP 636: Performed by: HOSPITALIST

## 2025-01-26 PROCEDURE — 82565 ASSAY OF CREATININE: CPT | Performed by: HOSPITALIST

## 2025-01-26 PROCEDURE — 99221 1ST HOSP IP/OBS SF/LOW 40: CPT | Performed by: PSYCHIATRY & NEUROLOGY

## 2025-01-26 PROCEDURE — 97535 SELF CARE MNGMENT TRAINING: CPT | Mod: GO

## 2025-01-26 PROCEDURE — 250N000011 HC RX IP 250 OP 636: Performed by: STUDENT IN AN ORGANIZED HEALTH CARE EDUCATION/TRAINING PROGRAM

## 2025-01-26 PROCEDURE — 80051 ELECTROLYTE PANEL: CPT | Performed by: HOSPITALIST

## 2025-01-26 PROCEDURE — 97162 PT EVAL MOD COMPLEX 30 MIN: CPT | Mod: GP

## 2025-01-26 PROCEDURE — 97530 THERAPEUTIC ACTIVITIES: CPT | Mod: GP

## 2025-01-26 PROCEDURE — 96372 THER/PROPH/DIAG INJ SC/IM: CPT | Performed by: HOSPITALIST

## 2025-01-26 PROCEDURE — 250N000013 HC RX MED GY IP 250 OP 250 PS 637: Performed by: INTERNAL MEDICINE

## 2025-01-26 PROCEDURE — 71045 X-RAY EXAM CHEST 1 VIEW: CPT

## 2025-01-26 PROCEDURE — 36415 COLL VENOUS BLD VENIPUNCTURE: CPT | Performed by: HOSPITALIST

## 2025-01-26 PROCEDURE — 85041 AUTOMATED RBC COUNT: CPT | Performed by: HOSPITALIST

## 2025-01-26 PROCEDURE — 258N000003 HC RX IP 258 OP 636: Performed by: INTERNAL MEDICINE

## 2025-01-26 PROCEDURE — 250N000013 HC RX MED GY IP 250 OP 250 PS 637: Performed by: STUDENT IN AN ORGANIZED HEALTH CARE EDUCATION/TRAINING PROGRAM

## 2025-01-26 PROCEDURE — 99207 PR NO CHARGE LOS: CPT | Performed by: INTERNAL MEDICINE

## 2025-01-26 PROCEDURE — 82140 ASSAY OF AMMONIA: CPT | Performed by: HOSPITALIST

## 2025-01-26 PROCEDURE — 120N000001 HC R&B MED SURG/OB

## 2025-01-26 PROCEDURE — 80048 BASIC METABOLIC PNL TOTAL CA: CPT | Performed by: HOSPITALIST

## 2025-01-26 PROCEDURE — 96374 THER/PROPH/DIAG INJ IV PUSH: CPT

## 2025-01-26 PROCEDURE — 99223 1ST HOSP IP/OBS HIGH 75: CPT | Performed by: HOSPITALIST

## 2025-01-26 PROCEDURE — 96361 HYDRATE IV INFUSION ADD-ON: CPT

## 2025-01-26 PROCEDURE — 97165 OT EVAL LOW COMPLEX 30 MIN: CPT | Mod: GO

## 2025-01-26 PROCEDURE — 250N000013 HC RX MED GY IP 250 OP 250 PS 637: Performed by: HOSPITALIST

## 2025-01-26 PROCEDURE — 250N000011 HC RX IP 250 OP 636: Performed by: HOSPITALIST

## 2025-01-26 PROCEDURE — 250N000013 HC RX MED GY IP 250 OP 250 PS 637: Performed by: PSYCHIATRY & NEUROLOGY

## 2025-01-26 PROCEDURE — 72148 MRI LUMBAR SPINE W/O DYE: CPT

## 2025-01-26 PROCEDURE — 85027 COMPLETE CBC AUTOMATED: CPT | Performed by: HOSPITALIST

## 2025-01-26 RX ORDER — POLYETHYLENE GLYCOL 3350 17 G/17G
17 POWDER, FOR SOLUTION ORAL DAILY
Status: DISCONTINUED | OUTPATIENT
Start: 2025-01-26 | End: 2025-01-30 | Stop reason: HOSPADM

## 2025-01-26 RX ORDER — LANOLIN ALCOHOL/MO/W.PET/CERES
1000 CREAM (GRAM) TOPICAL DAILY
Status: ON HOLD | COMMUNITY
End: 2025-01-30

## 2025-01-26 RX ORDER — ACETAMINOPHEN 325 MG/1
975 TABLET ORAL 3 TIMES DAILY
Status: DISCONTINUED | OUTPATIENT
Start: 2025-01-26 | End: 2025-01-30 | Stop reason: HOSPADM

## 2025-01-26 RX ORDER — HYDRALAZINE HYDROCHLORIDE 10 MG/1
10 TABLET, FILM COATED ORAL EVERY 4 HOURS PRN
Status: DISCONTINUED | OUTPATIENT
Start: 2025-01-26 | End: 2025-01-30 | Stop reason: HOSPADM

## 2025-01-26 RX ORDER — ACETAMINOPHEN 650 MG/1
650 SUPPOSITORY RECTAL EVERY 4 HOURS PRN
Status: DISCONTINUED | OUTPATIENT
Start: 2025-01-26 | End: 2025-01-26

## 2025-01-26 RX ORDER — SODIUM CHLORIDE 9 MG/ML
INJECTION, SOLUTION INTRAVENOUS CONTINUOUS
Status: DISCONTINUED | OUTPATIENT
Start: 2025-01-26 | End: 2025-01-26

## 2025-01-26 RX ORDER — ACETAMINOPHEN 650 MG/1
650 SUPPOSITORY RECTAL 3 TIMES DAILY
Status: DISCONTINUED | OUTPATIENT
Start: 2025-01-26 | End: 2025-01-30 | Stop reason: HOSPADM

## 2025-01-26 RX ORDER — LIDOCAINE 40 MG/G
CREAM TOPICAL
Status: DISCONTINUED | OUTPATIENT
Start: 2025-01-26 | End: 2025-01-30 | Stop reason: HOSPADM

## 2025-01-26 RX ORDER — LIDOCAINE 4 G/G
1 PATCH TOPICAL
Status: DISCONTINUED | OUTPATIENT
Start: 2025-01-26 | End: 2025-01-30 | Stop reason: HOSPADM

## 2025-01-26 RX ORDER — ONDANSETRON 4 MG/1
4 TABLET, ORALLY DISINTEGRATING ORAL EVERY 6 HOURS PRN
Status: DISCONTINUED | OUTPATIENT
Start: 2025-01-26 | End: 2025-01-30 | Stop reason: HOSPADM

## 2025-01-26 RX ORDER — CARBIDOPA AND LEVODOPA 25; 100 MG/1; MG/1
2 TABLET ORAL ONCE
Status: COMPLETED | OUTPATIENT
Start: 2025-01-26 | End: 2025-01-26

## 2025-01-26 RX ORDER — MORPHINE SULFATE 2 MG/ML
1 INJECTION, SOLUTION INTRAMUSCULAR; INTRAVENOUS
Status: DISCONTINUED | OUTPATIENT
Start: 2025-01-26 | End: 2025-01-26

## 2025-01-26 RX ORDER — OXYCODONE HYDROCHLORIDE 5 MG/1
5 TABLET ORAL ONCE
Status: COMPLETED | OUTPATIENT
Start: 2025-01-26 | End: 2025-01-26

## 2025-01-26 RX ORDER — LANOLIN ALCOHOL/MO/W.PET/CERES
100 CREAM (GRAM) TOPICAL DAILY
Status: ON HOLD | COMMUNITY
End: 2025-01-30

## 2025-01-26 RX ORDER — ASPIRIN 81 MG/1
162 TABLET ORAL DAILY
Status: ON HOLD | COMMUNITY
End: 2025-01-30

## 2025-01-26 RX ORDER — OXYCODONE HYDROCHLORIDE 5 MG/1
5 TABLET ORAL EVERY 4 HOURS PRN
Status: DISCONTINUED | OUTPATIENT
Start: 2025-01-26 | End: 2025-01-30 | Stop reason: HOSPADM

## 2025-01-26 RX ORDER — IBUPROFEN 600 MG/1
600 TABLET, FILM COATED ORAL EVERY 4 HOURS PRN
Status: DISCONTINUED | OUTPATIENT
Start: 2025-01-26 | End: 2025-01-27

## 2025-01-26 RX ORDER — NALOXONE HYDROCHLORIDE 0.4 MG/ML
0.2 INJECTION, SOLUTION INTRAMUSCULAR; INTRAVENOUS; SUBCUTANEOUS
Status: DISCONTINUED | OUTPATIENT
Start: 2025-01-26 | End: 2025-01-30 | Stop reason: HOSPADM

## 2025-01-26 RX ORDER — OXYCODONE HYDROCHLORIDE 5 MG/1
5 TABLET ORAL EVERY 4 HOURS PRN
Status: DISCONTINUED | OUTPATIENT
Start: 2025-01-26 | End: 2025-01-26

## 2025-01-26 RX ORDER — NALOXONE HYDROCHLORIDE 0.4 MG/ML
0.4 INJECTION, SOLUTION INTRAMUSCULAR; INTRAVENOUS; SUBCUTANEOUS
Status: DISCONTINUED | OUTPATIENT
Start: 2025-01-26 | End: 2025-01-30 | Stop reason: HOSPADM

## 2025-01-26 RX ORDER — HYDRALAZINE HYDROCHLORIDE 20 MG/ML
10 INJECTION INTRAMUSCULAR; INTRAVENOUS EVERY 4 HOURS PRN
Status: DISCONTINUED | OUTPATIENT
Start: 2025-01-26 | End: 2025-01-30 | Stop reason: HOSPADM

## 2025-01-26 RX ORDER — ENOXAPARIN SODIUM 100 MG/ML
30 INJECTION SUBCUTANEOUS EVERY 24 HOURS
Status: DISCONTINUED | OUTPATIENT
Start: 2025-01-26 | End: 2025-01-30 | Stop reason: HOSPADM

## 2025-01-26 RX ORDER — ACETAMINOPHEN 325 MG/1
650 TABLET ORAL EVERY 4 HOURS PRN
Status: DISCONTINUED | OUTPATIENT
Start: 2025-01-26 | End: 2025-01-26

## 2025-01-26 RX ORDER — ONDANSETRON 2 MG/ML
4 INJECTION INTRAMUSCULAR; INTRAVENOUS EVERY 6 HOURS PRN
Status: DISCONTINUED | OUTPATIENT
Start: 2025-01-26 | End: 2025-01-30 | Stop reason: HOSPADM

## 2025-01-26 RX ORDER — CARBIDOPA AND LEVODOPA 25; 100 MG/1; MG/1
1 TABLET ORAL 3 TIMES DAILY
Status: DISCONTINUED | OUTPATIENT
Start: 2025-01-26 | End: 2025-01-30 | Stop reason: HOSPADM

## 2025-01-26 RX ORDER — CALCIUM CARBONATE 500 MG/1
1000 TABLET, CHEWABLE ORAL 4 TIMES DAILY PRN
Status: DISCONTINUED | OUTPATIENT
Start: 2025-01-26 | End: 2025-01-30 | Stop reason: HOSPADM

## 2025-01-26 RX ORDER — AMOXICILLIN 250 MG
1 CAPSULE ORAL 2 TIMES DAILY PRN
Status: DISCONTINUED | OUTPATIENT
Start: 2025-01-26 | End: 2025-01-30 | Stop reason: HOSPADM

## 2025-01-26 RX ORDER — AMOXICILLIN 250 MG
2 CAPSULE ORAL 2 TIMES DAILY PRN
Status: DISCONTINUED | OUTPATIENT
Start: 2025-01-26 | End: 2025-01-30 | Stop reason: HOSPADM

## 2025-01-26 RX ADMIN — ACETAMINOPHEN 975 MG: 325 TABLET ORAL at 13:19

## 2025-01-26 RX ADMIN — ACETAMINOPHEN 975 MG: 325 TABLET ORAL at 08:17

## 2025-01-26 RX ADMIN — CARBIDOPA AND LEVODOPA 1 TABLET: 25; 100 TABLET ORAL at 17:14

## 2025-01-26 RX ADMIN — IBUPROFEN 600 MG: 600 TABLET, FILM COATED ORAL at 04:18

## 2025-01-26 RX ADMIN — CARBIDOPA AND LEVODOPA 2 TABLET: 25; 100 TABLET ORAL at 12:52

## 2025-01-26 RX ADMIN — LEVODOPA AND CARBIDOPA 1 CAPSULE: 195; 48.75 CAPSULE, EXTENDED RELEASE ORAL at 19:46

## 2025-01-26 RX ADMIN — OXYCODONE HYDROCHLORIDE 5 MG: 5 TABLET ORAL at 22:05

## 2025-01-26 RX ADMIN — OXYCODONE HYDROCHLORIDE 5 MG: 5 TABLET ORAL at 05:42

## 2025-01-26 RX ADMIN — POLYETHYLENE GLYCOL 3350 17 G: 17 POWDER, FOR SOLUTION ORAL at 08:18

## 2025-01-26 RX ADMIN — METHYLPREDNISOLONE SODIUM SUCCINATE 125 MG: 125 INJECTION, POWDER, FOR SOLUTION INTRAMUSCULAR; INTRAVENOUS at 00:21

## 2025-01-26 RX ADMIN — ENOXAPARIN SODIUM 30 MG: 30 INJECTION SUBCUTANEOUS at 08:17

## 2025-01-26 RX ADMIN — SODIUM CHLORIDE 1000 ML: 9 INJECTION, SOLUTION INTRAVENOUS at 17:15

## 2025-01-26 RX ADMIN — LIDOCAINE 1 PATCH: 4 PATCH TOPICAL at 08:18

## 2025-01-26 RX ADMIN — OXYCODONE HYDROCHLORIDE 5 MG: 5 TABLET ORAL at 17:14

## 2025-01-26 RX ADMIN — OXYCODONE HYDROCHLORIDE 5 MG: 5 TABLET ORAL at 00:44

## 2025-01-26 RX ADMIN — SODIUM CHLORIDE: 9 INJECTION, SOLUTION INTRAVENOUS at 03:50

## 2025-01-26 RX ADMIN — ACETAMINOPHEN 975 MG: 325 TABLET ORAL at 19:45

## 2025-01-26 RX ADMIN — LEVODOPA AND CARBIDOPA 1 CAPSULE: 195; 48.75 CAPSULE, EXTENDED RELEASE ORAL at 14:28

## 2025-01-26 RX ADMIN — LEVODOPA AND CARBIDOPA 3 CAPSULE: 195; 48.75 CAPSULE, EXTENDED RELEASE ORAL at 00:19

## 2025-01-26 ASSESSMENT — ACTIVITIES OF DAILY LIVING (ADL)
ADLS_ACUITY_SCORE: 47
ADLS_ACUITY_SCORE: 43
DEPENDENT_IADLS:: CLEANING;COOKING;SHOPPING;MEAL PREPARATION;TRANSPORTATION
ADLS_ACUITY_SCORE: 43
ADLS_ACUITY_SCORE: 47
ADLS_ACUITY_SCORE: 47
ADLS_ACUITY_SCORE: 43
ADLS_ACUITY_SCORE: 47
ADLS_ACUITY_SCORE: 43
ADLS_ACUITY_SCORE: 43
ADLS_ACUITY_SCORE: 47
ADLS_ACUITY_SCORE: 43
ADLS_ACUITY_SCORE: 47
ADLS_ACUITY_SCORE: 43
ADLS_ACUITY_SCORE: 47
ADLS_ACUITY_SCORE: 43
ADLS_ACUITY_SCORE: 43

## 2025-01-26 NOTE — ED PROVIDER NOTES
Emergency Department Encounter         FINAL IMPRESSION:  Back pain, parkinson disease        ED COURSE AND MEDICAL DECISION MAKING     64-year-old female history of Parkinson disease, hypertension, here from home after she essentially had a atonic episode/catatonic episode while in bed.  Reports that she then tried to get out of bed however wedged her self between the bed and the nightstand because of her bad Parkinson's disease was unable to get out.  Called 911.    Arrival here she is alert and oriented.  Has worsening lumbosacral back pain with radicular pain down both legs right greater than left.  No bowel or bladder incontinence or any other red flag symptoms.  No fevers chills nausea vomiting.  No chest pain or trouble breathing.  Reports that she has significant parkinsonian symptoms in between her dosages of carbidopa levodopa.    Her neuro examination is otherwise unremarkable.  She has normal strength.  She is a two-person assist out of the wheelchair.  She has no midline pain.  Does have some paresthesias in right lateral thigh.  Normal pulses distally.    Will admit at this point as I am concerned patient able to take care of herself at home.  It sound like she has been struggle with ADLs the last few weeks anyways.  Discussed case with hospitalist.        ED Course as of 01/26/25 0053   Sat Jan 25, 2025   2315 I met with the patient to gather history and perform my exam. ED course and treatment discussed. Patient seen in [facility] due to critical capacity and boarding crisis leaving no ED rooms available.    2328 Patient is a 64-year-old female history of Parkinson's disease, was seen earlier in the month on 1/12/2025   Sun Jan 26, 2025   0040 Nurse reports the patient was unable to undergo the MRI due to her pain preventing her from laying flat. Plan for more pain medication and reevaluation   0053 I discussed the patient with Dr. Swan from the hospitalist service who agrees to admit the patient.                       Medical Decision Making  Obtained supplemental history:Supplemental history obtained?: No  Reviewed external records: External records reviewed?: Documented in chart  Care impacted by chronic illness:Other: Parkinson's Disease  Did you consider but not order tests?: Work up considered but not performed and documented in chart, if applicable  Did you interpret images independently?: Independent interpretation of ECG and images noted in documentation, when applicable.  Consultation discussion with other provider:Did you involve another provider (consultant, , pharmacy, etc.)?: I discussed the care with another health care provider, see documentation for details.  Admit.    MIPS: Not Applicable                    EKG:      I independently reviewed and interpreted the patient's EKG, with comments made as listed above.    Please see scanned EKG for full report.                       MEDICATIONS GIVEN IN THE EMERGENCY DEPARTMENT:  Medications   lidocaine 1 % 0.1-1 mL (has no administration in time range)   lidocaine (LMX4) cream (has no administration in time range)   sodium chloride (PF) 0.9% PF flush 3 mL (has no administration in time range)   sodium chloride (PF) 0.9% PF flush 3 mL (has no administration in time range)   senna-docusate (SENOKOT-S/PERICOLACE) 8.6-50 MG per tablet 1 tablet (has no administration in time range)     Or   senna-docusate (SENOKOT-S/PERICOLACE) 8.6-50 MG per tablet 2 tablet (has no administration in time range)   calcium carbonate (TUMS) chewable tablet 1,000 mg (has no administration in time range)   enoxaparin ANTICOAGULANT (LOVENOX) injection 30 mg (has no administration in time range)   sodium chloride 0.9 % infusion (has no administration in time range)   hydrALAZINE (APRESOLINE) tablet 10 mg (has no administration in time range)     Or   hydrALAZINE (APRESOLINE) injection 10 mg (has no administration in time range)   acetaminophen (TYLENOL) tablet 650 mg (has no  "administration in time range)     Or   acetaminophen (TYLENOL) Suppository 650 mg (has no administration in time range)   polyethylene glycol (MIRALAX) Packet 17 g (has no administration in time range)   ondansetron (ZOFRAN ODT) ODT tab 4 mg (has no administration in time range)     Or   ondansetron (ZOFRAN) injection 4 mg (has no administration in time range)   benzocaine-menthol (CHLORASEPTIC) 6-10 MG lozenge 1 lozenge (has no administration in time range)   sodium chloride 0.9% BOLUS 1,000 mL (0 mLs Intravenous Stopped 1/26/25 0050)   ibuprofen (ADVIL/MOTRIN) tablet 600 mg (600 mg Oral $Given 1/25/25 2238)   methylPREDNISolone Na Suc (solu-MEDROL) injection 125 mg (125 mg Intravenous $Given 1/26/25 0021)   carbidopa-levodopa (RYTARY) 48. MG per CR capsule 3 capsule (3 capsules Oral $Given 1/26/25 0019)   oxyCODONE (ROXICODONE) tablet 5 mg (5 mg Oral $Given 1/26/25 0044)       NEW PRESCRIPTIONS STARTED AT TODAY'S ED VISIT:  New Prescriptions    No medications on file       HPI     Patient information obtained from: Patient     Use of Interpretor: N/A     Marlene Merida is a 64 year old female with a pertinent history of Parkinson's disease who presents to this ED via EMS for evaluation of generalized weakness and low back pain    Per chart review: The patient was seen in this ED on 1/12/2025 for left hip pain and low back pain.  Patient reported being unable to walk due to the pain and felt unsteady on her feet.  Pelvis/left hip x-ray showed no acute injury, but possibility of chronic, but healed stress fracture in the left hip.  Patient was treated with Toradol and discharged with prescription for prednisone.    The patient reports she became \"wedged\" between her bed and nightstand and was unable to move.  The patient now complains of low back and right leg pain that radiates from the right knee up to the right hip and to the low back.  The patient also reports she is unable to walk or stand without " assistance and notes this is unusual for her.  The patient reports a history of Parkinson disease and reports that her medication dose was recently decreased, so she has been having episodes of weakness more frequently lately.  The patient reports mild nausea as well.    The patient denies any falls, fever, chest pain, bowel or bladder difficulties, vomiting, dysuria, or any other symptoms or complaints.    The patient reports she lives alone and is interested in home healthcare, but does not want to move into a nursing home.     MEDICAL HISTORY     Past Medical History:   Diagnosis Date    Parkinson's disease (H)        Past Surgical History:   Procedure Laterality Date    BREAST SURGERY      GYN SURGERY         Social History     Tobacco Use    Smoking status: Never     Passive exposure: Never    Smokeless tobacco: Never   Vaping Use    Vaping status: Never Used   Substance Use Topics    Alcohol use: Yes     Comment: 1-2 beers a month     Drug use: Never       ASPIRIN PO  carbidopa-levodopa (RYTARY) 48. MG CPCR per CR capsule  ibuprofen (ADVIL/MOTRIN) 200 MG tablet  melatonin 3 MG tablet  predniSONE (DELTASONE) 20 MG tablet  traZODone (DESYREL) 100 MG tablet  vitamin C (ASCORBIC ACID) 500 MG tablet  Vitamin D3 (VITAMIN D) 10 MCG (400 UNIT) tablet            PHYSICAL EXAM     BP (!) 142/70   Pulse 95   Temp 98.6  F (37  C) (Oral)   Resp 20   Wt 49 kg (108 lb)   LMP  (LMP Unknown)   SpO2 96%   BMI 17.43 kg/m        PHYSICAL EXAM:     General: Patient appears well, nontoxic, comfortable  HEENT: Moist mucous membranes,  No head trauma.    Cardiovascular: Normal rate, normal rhythm, no extremity edema.  No appreciable murmur.  Respiratory: No signs of respiratory distress, lungs are clear to auscultation bilaterally with no wheezes rhonchi or rales.  Abdominal: Soft, nontender, nondistended, no palpable masses, no guarding, no rebound  Musculoskeletal: Full range of motion of joints, no deformities  appreciated.  Neurological: Alert and oriented, patient unable to stand without 2 person assist, normal sensation.  Normal pulses.  Psychological: Normal affect and mood.  Integument: No rashes appreciated          RESULTS       Labs Ordered and Resulted from Time of ED Arrival to Time of ED Departure   ROUTINE UA WITH MICROSCOPIC REFLEX TO CULTURE - Abnormal       Result Value    Color Urine Light Yellow      Appearance Urine Clear      Glucose Urine Negative      Bilirubin Urine Negative      Ketones Urine Negative      Specific Gravity Urine 1.016      Blood Urine Negative      pH Urine 7.0      Protein Albumin Urine Negative      Urobilinogen Urine <2.0      Nitrite Urine Negative      Leukocyte Esterase Urine 250 Amaya/uL (*)     Bacteria Urine Few (*)     WBC Clumps Urine Present (*)     Mucus Urine Present (*)     RBC Urine <1      WBC Urine 10 (*)     Squamous Epithelials Urine <1     COMPREHENSIVE METABOLIC PANEL - Abnormal    Sodium 143      Potassium 4.2      Carbon Dioxide (CO2) 25      Anion Gap 9      Urea Nitrogen 27.5 (*)     Creatinine 0.50 (*)     GFR Estimate >90      Calcium 9.7      Chloride 109 (*)     Glucose 106 (*)     Alkaline Phosphatase 109      AST 35      ALT 27      Protein Total 7.0      Albumin 4.1      Bilirubin Total 0.4     CBC WITH PLATELETS AND DIFFERENTIAL    WBC Count 9.7      RBC Count 4.41      Hemoglobin 14.2      Hematocrit 41.9      MCV 95      MCH 32.2      MCHC 33.9      RDW 14.2      Platelet Count 326      % Neutrophils 74      % Lymphocytes 17      % Monocytes 7      % Eosinophils 1      % Basophils 1      % Immature Granulocytes 0      NRBCs per 100 WBC 0      Absolute Neutrophils 7.2      Absolute Lymphocytes 1.6      Absolute Monocytes 0.7      Absolute Eosinophils 0.1      Absolute Basophils 0.1      Absolute Immature Granulocytes 0.0      Absolute NRBCs 0.0     BASIC METABOLIC PANEL   CBC WITH PLATELETS   URINE CULTURE       Lumbar spine MRI w/o contrast     (Results Pending)                     PROCEDURES:  Procedures:  Procedures       I, Hui Lamar am serving as a scribe to document services personally performed by Etienne Cantor DO, based on my observations and the provider's statements to me.  I, Etienne Cantor DO, attest that Hui Lamar is acting in a scribe capacity, has observed my performance of the services and has documented them in accordance with my direction.    Etienne Cantor DO  Emergency Medicine  [facility] EMERGENCY DEPARTMENT       Etienne Cantor DO  01/26/25 0440

## 2025-01-26 NOTE — ED NOTES
Updates regarding pt's hospital visit given to pt's daughter Bibiana. She would like further updates once pt is seen by consults on what the POC would be; Bibiana is the one managing her medical cares.

## 2025-01-26 NOTE — CONSULTS
[facility]  [place]    Marlene Merida MRN# [id number]   Age: 64 year old YOB: 1960               Assessment and Plan:      Advanced Parkinson disease  Low back pain with pain radiating into the legs     It sounds like she is still having significant oft times despite using Rytary 3 times a day.  We can add some immediate release carbidopa levodopa in between doses of the Rytary to see if this will help, and if so, she will need to set up reminders for herself to take this complicated schedule.  Exam shows involvement of the legs much more than the arms which fits with her mobility difficulties.    She does have a history of hallucinations, so ropinirole and pramipexole may not be good options for her, at least not without regular dosing of antipsychotic medication.    Physical therapy while here    MRI of the lumbar spine is pending.  If there is no significant spinal stenosis to explain the pain radiating into the legs, it may be worth trying some cyclobenzaprine.      I will follow up tomorrow            Chief Complaint/HPI:     This patient is a 64-year-old woman with a history significant for Parkinson disease.  She is followed at the [facility].  She is in the more advanced stages of Parkinson's now, requiring increasing doses of carbidopa levodopa.  She was switched to Rytary which is a more controlled release formulation as she was having trouble remembering her doses.  As I review our MAR, it looks like she got a dose around midnight but none since then.  On a good day she can walk outside with her walker.  She will try to walk inside without assistive device.  On bad days she will stay in the wheelchair.  She is also experiencing significant low back pain that radiates into the legs on both sides.  The pain goes down to the knees anteriorly and goes down to the heels posteriorly.            Past Medical History:    has a past medical history of Parkinson's disease  (H).          Past Surgical History:    has a past surgical history that includes Breast surgery and GYN surgery.          Social History:     Social History     Tobacco Use    Smoking status: Never     Passive exposure: Never    Smokeless tobacco: Never   Substance Use Topics    Alcohol use: Yes     Comment: 1-2 beers a month              Family History:     Family History   Problem Relation Age of Onset    Alzheimer Disease Mother     Hypertension Mother     Hypertension Father     Arthritis Father     Diabetes Paternal Grandmother     Diabetes Brother     Breast Cancer No family hx of                 Allergies:   No Known Allergies          Medications:     Current Facility-Administered Medications:     acetaminophen (TYLENOL) tablet 975 mg, 975 mg, Oral, TID, 975 mg at 01/26/25 0817 **OR** acetaminophen (TYLENOL) Suppository 650 mg, 650 mg, Rectal, TID, Gaurav Swan MD    benzocaine-menthol (CHLORASEPTIC) 6-10 MG lozenge 1 lozenge, 1 lozenge, Buccal, Q1H PRN, Gaurav Swan MD    calcium carbonate (TUMS) chewable tablet 1,000 mg, 1,000 mg, Oral, 4x Daily PRN, Gaurav Swan MD    enoxaparin ANTICOAGULANT (LOVENOX) injection 30 mg, 30 mg, Subcutaneous, Q24H, Gaurav Swan MD, 30 mg at 01/26/25 0817    hydrALAZINE (APRESOLINE) tablet 10 mg, 10 mg, Oral, Q4H PRN **OR** hydrALAZINE (APRESOLINE) injection 10 mg, 10 mg, Intravenous, Q4H PRN, Gaurav Swan MD    ibuprofen (ADVIL/MOTRIN) tablet 600 mg, 600 mg, Oral, Q4H PRN, Gaurav Swan MD, 600 mg at 01/26/25 0418    Lidocaine (LIDOCARE) 4 % Patch 1 patch, 1 patch, Transdermal, Q24H, Gaurav Swan MD, 1 patch at 01/26/25 0818    lidocaine (LMX4) cream, , Topical, Q1H PRN, Gaurav Swan MD    lidocaine 1 % 0.1-1 mL, 0.1-1 mL, Other, Q1H PRN, Gaurav Swan MD    naloxone (NARCAN) injection 0.2 mg, 0.2 mg, Intravenous, Q2 Min PRN **OR** naloxone (NARCAN) injection 0.4 mg, 0.4 mg, Intravenous, Q2 Min PRN **OR** naloxone (NARCAN) injection 0.2 mg, 0.2 mg,  Intramuscular, Q2 Min PRN **OR** naloxone (NARCAN) injection 0.4 mg, 0.4 mg, Intramuscular, Q2 Min PRN, Gaurav Swan MD    ondansetron (ZOFRAN ODT) ODT tab 4 mg, 4 mg, Oral, Q6H PRN **OR** ondansetron (ZOFRAN) injection 4 mg, 4 mg, Intravenous, Q6H PRN, Gaurav Swan MD    oxyCODONE (ROXICODONE) tablet 5 mg, 5 mg, Oral, Q4H PRN, Yonatan Love DO    oxyCODONE IR (ROXICODONE) half-tab 2.5 mg, 2.5 mg, Oral, Q4H PRN, Yonatan Love DO    polyethylene glycol (MIRALAX) Packet 17 g, 17 g, Oral, Daily, Gaurav Swan MD, 17 g at 01/26/25 0818    senna-docusate (SENOKOT-S/PERICOLACE) 8.6-50 MG per tablet 1 tablet, 1 tablet, Oral, BID PRN **OR** senna-docusate (SENOKOT-S/PERICOLACE) 8.6-50 MG per tablet 2 tablet, 2 tablet, Oral, BID PRN, Gaurav Swan MD    sodium chloride (PF) 0.9% PF flush 3 mL, 3 mL, Intracatheter, Q8H, Gaurav Swan MD, 3 mL at 01/26/25 0818    sodium chloride (PF) 0.9% PF flush 3 mL, 3 mL, Intracatheter, q1 min prn, Gaurav Swan MD    Current Outpatient Medications:     aspirin 81 MG EC tablet, Take 162 mg by mouth daily., Disp: , Rfl:     carbidopa-levodopa (RYTARY) 48. MG CPCR per CR capsule, Take 2 capsules by mouth 3 times daily., Disp: , Rfl:     cyanocobalamin (VITAMIN B-12) 1000 MCG tablet, Take 1,000 mcg by mouth daily., Disp: , Rfl:     ibuprofen (ADVIL/MOTRIN) 200 MG tablet, Take 400 mg by mouth as needed for mild pain., Disp: , Rfl:     melatonin 3 MG tablet, Take 6 mg by mouth at bedtime., Disp: , Rfl:     thiamine (B-1) 100 MG tablet, Take 100 mg by mouth daily., Disp: , Rfl:     traZODone (DESYREL) 100 MG tablet, Take 1 tablet (100 mg) by mouth at bedtime., Disp: 90 tablet, Rfl: 0    vitamin C (ASCORBIC ACID) 500 MG tablet, Take 500 mg by mouth daily., Disp: , Rfl:     Vitamin D3 (VITAMIN D) 10 MCG (400 UNIT) tablet, Take 400 Units by mouth daily., Disp: , Rfl:               Physical Exam:      Vitals: BP 96/51 (BP Location: Left arm, Patient Position:  Sitting, Cuff Size: Adult Regular)   Pulse 95   Temp 98.3  F (36.8  C) (Oral)   Resp 20   Wt 49 kg (108 lb)   LMP  (LMP Unknown)   SpO2 96%   BMI 17.43 kg/m    BMI= Body mass index is 17.43 kg/m .     Patient is alert and in no acute distress. Neck was supple, no carotid bruits, thyromegaly, lymphadenopathy or JVD noted.   Neurological Exam:   Mental status: Patient is alert and oriented x 3. Speech is clear and fluent      CN II: Visual fields are full to confrontation.  PERRLA.   CN III, IV, VI: EOMI.    CN VII: Face is symmetric, mildly reduced facial expression   CN VII: Hearing is normal to conversation   CN IX, X: Palate elevates symmetrically. Phonation is normal no significant dysarthria, .    CN XI: Head turning and shoulder shrug are intact   CN XII: Tongue is midline with normal movements and no atrophy or fasciculations.   Motor: no pronator drift. Strength is 5/5 bilaterally. No fasciculations noted.   Reflexes: Reflexes are symmetric, quite brisk throughout   Sensory: Light touch, pinprick sense are intact bilaterally.    Coordination: Rapid alternating movements and fine finger movements are  actually fairly good in the arms.  She has significant difficulty initiating movement in the legs   Gait: Unable to get up from the bed due to bradykinesia  There is moderate resting tremor in all 4 extremities   Tone is actually quite good in the arms legs show moderately severe rigidity on passive movement     Jhonatan Alvares MD       More than 45 minutes spent reviewing records and results, evaluating patient, discussing with pt and family and on documentation

## 2025-01-26 NOTE — PLAN OF CARE
Goal Outcome Evaluation:       Receive medical treatment, have help coming up with safe discharge plan.

## 2025-01-26 NOTE — ED TRIAGE NOTES
Pt states she has parkinsons, has been increasingly weaker for a week.  Pt states she has sciatica down both legs, right greater than left.  Pt arrives via [facility].  Pt was in bed and wedged, unable to straighten out.  Pt also wants info on medical alert bracelet.  Pt lives in senior apartment      Triage Assessment (Adult)       Row Name 01/25/25 0836          Triage Assessment    Airway WDL WDL        Respiratory WDL    Respiratory WDL WDL        Skin Circulation/Temperature WDL    Skin Circulation/Temperature WDL WDL        Cardiac WDL    Cardiac WDL WDL        Peripheral/Neurovascular WDL    Peripheral Neurovascular WDL X   weakness due to parkinsons, sciatica        Cognitive/Neuro/Behavioral WDL    Cognitive/Neuro/Behavioral WDL WDL

## 2025-01-26 NOTE — PHARMACY-ADMISSION MEDICATION HISTORY
Pharmacist Admission Medication History    Admission medication history is complete. The information provided in this note is only as accurate as the sources available at the time of the update.    Information Source(s): Patient and CareEverywhere/SureScripts via in-person    Pertinent Information:   Spoke to patient who knew her medications pretty well. Medications are consistent with discharge list from [facility] on 12/27    Changes made to PTA medication list:  Added: Thiamine, B12  Deleted: Prednisone  Changed: Rytary 3 cap TID -> 2 cap BID    Allergies reviewed with patient and updates made in EHR: yes    Medication History Completed By: Armando Bernstein [facility] 1/26/2025 9:30 AM    PTA Med List   Medication Sig Last Dose/Taking    aspirin 81 MG EC tablet Take 162 mg by mouth daily. 1/24/2025 Morning    carbidopa-levodopa (RYTARY) 48. MG CPCR per CR capsule Take 2 capsules by mouth 3 times daily. 1/25/2025 Morning    cyanocobalamin (VITAMIN B-12) 1000 MCG tablet Take 1,000 mcg by mouth daily. 1/25/2025 Morning    thiamine (B-1) 100 MG tablet Take 100 mg by mouth daily. 1/25/2025 Morning    ibuprofen (ADVIL/MOTRIN) 200 MG tablet Take 400 mg by mouth as needed for mild pain. Taking As Needed    melatonin 3 MG tablet Take 6 mg by mouth at bedtime. 1/24/2025 Bedtime    traZODone (DESYREL) 100 MG tablet Take 1 tablet (100 mg) by mouth at bedtime. 1/24/2025 Bedtime    vitamin C (ASCORBIC ACID) 500 MG tablet Take 500 mg by mouth daily. 1/25/2025 Morning    Vitamin D3 (VITAMIN D) 10 MCG (400 UNIT) tablet Take 400 Units by mouth daily. 1/25/2025 Morning
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
## 2025-01-26 NOTE — ED NOTES
"Pt turns on call light to report that after moving around her back pain is back and is wanting something for the pain. Pt states, \"I can't do this scan because my back hurts too much.\". PRN medications reviewed with the pt. SEE MAR.  "

## 2025-01-26 NOTE — ED NOTES
"Changed pt into hospital gown with assist of 2; Repositioning done with assist of 2. Pt unable to get comfortable and tearful crying, pt states, \"I use to be able to move around just fine but it is so different now.\".  "

## 2025-01-26 NOTE — CONSULTS
CLINICAL NUTRITION SERVICES - ASSESSMENT NOTE    RECOMMENDATIONS FOR MDs/PROVIDERS TO ORDER:      Malnutrition Status:    Not noted though at risk    Registered Dietitian Interventions:  Ensure Enlive 2x/day    Future/Additional Recommendations:       REASON FOR ASSESSMENT  Provider order - underweight    SUBJECTIVE INFORMATION  Assessed patient in room.    NUTRITION HISTORY  Met patient in the ED.  Patient eats 3 meals/day and recently has been working on cutting out processed foods.  She drinks two Ensure protein supplements/day.        CURRENT NUTRITION ORDERS  Diet: Regular    CURRENT INTAKE/TOLERANCE  Intake not recorded.  Patient reports eating breakfast this am.      LABS  Nutrition-relevant labs: Reviewed    MEDICATIONS  Nutrition-relevant medications:   Current Facility-Administered Medications   Medication Dose Route Frequency Provider Last Rate Last Admin    acetaminophen (TYLENOL) tablet 975 mg  975 mg Oral TID Gaurav Swan MD   975 mg at 01/26/25 0817    Or    acetaminophen (TYLENOL) Suppository 650 mg  650 mg Rectal TID Gaurav Swan MD        carbidopa-levodopa (RYTARY) 48. MG per CR capsule 1 capsule  1 capsule Oral TID Jhonatan Alvares MD        carbidopa-levodopa (SINEMET)  MG per tablet 1 tablet  1 tablet Oral TID Jhonatan Alvares MD        carbidopa-levodopa (SINEMET)  MG per tablet 2 tablet  2 tablet Oral Once Jhonatan Alvares MD        enoxaparin ANTICOAGULANT (LOVENOX) injection 30 mg  30 mg Subcutaneous Q24H Gaurav Swan MD   30 mg at 01/26/25 0817    Lidocaine (LIDOCARE) 4 % Patch 1 patch  1 patch Transdermal Q24H Gaurav Swan MD   1 patch at 01/26/25 0818    polyethylene glycol (MIRALAX) Packet 17 g  17 g Oral Daily Gaurav Swan MD   17 g at 01/26/25 0818    sodium chloride (PF) 0.9% PF flush 3 mL  3 mL Intracatheter Q8H Gaurav Swan MD   3 mL at 01/26/25 0818      Current Facility-Administered Medications   Medication Dose Route Frequency Provider Last  Rate Last Admin      Current Facility-Administered Medications   Medication Dose Route Frequency Provider Last Rate Last Admin    benzocaine-menthol (CHLORASEPTIC) 6-10 MG lozenge 1 lozenge  1 lozenge Buccal Q1H PRN Gaurav Swan MD        calcium carbonate (TUMS) chewable tablet 1,000 mg  1,000 mg Oral 4x Daily PRN Gaurav Swan MD        hydrALAZINE (APRESOLINE) tablet 10 mg  10 mg Oral Q4H PRN Gaurav Swan MD        Or    hydrALAZINE (APRESOLINE) injection 10 mg  10 mg Intravenous Q4H PRN Gaurav Swan MD        ibuprofen (ADVIL/MOTRIN) tablet 600 mg  600 mg Oral Q4H PRN Gaurav Swan MD   600 mg at 01/26/25 0418    lidocaine (LMX4) cream   Topical Q1H PRN Gaurav Swan MD        lidocaine 1 % 0.1-1 mL  0.1-1 mL Other Q1H PRN Gaurav Swan MD        naloxone (NARCAN) injection 0.2 mg  0.2 mg Intravenous Q2 Min PRN Gaurav Swan MD        Or    naloxone (NARCAN) injection 0.4 mg  0.4 mg Intravenous Q2 Min PRN Gaurav Swan MD        Or    naloxone (NARCAN) injection 0.2 mg  0.2 mg Intramuscular Q2 Min PRN Gaurav Swan MD        Or    naloxone (NARCAN) injection 0.4 mg  0.4 mg Intramuscular Q2 Min PRN Gaurav Swan MD        ondansetron (ZOFRAN ODT) ODT tab 4 mg  4 mg Oral Q6H PRN Gaurav Swan MD        Or    ondansetron (ZOFRAN) injection 4 mg  4 mg Intravenous Q6H PRN Gaurav Swan MD        oxyCODONE (ROXICODONE) tablet 5 mg  5 mg Oral Q4H PRN Yonatan Love DO        oxyCODONE IR (ROXICODONE) half-tab 2.5 mg  2.5 mg Oral Q4H PRN Yonatan Love DO        senna-docusate (SENOKOT-S/PERICOLACE) 8.6-50 MG per tablet 1 tablet  1 tablet Oral BID PRN Gaurav Swan MD        Or    senna-docusate (SENOKOT-S/PERICOLACE) 8.6-50 MG per tablet 2 tablet  2 tablet Oral BID PRN Gaurav Swan MD        sodium chloride (PF) 0.9% PF flush 3 mL  3 mL Intracatheter q1 min prn Gaurav Swan MD          Home medications include vitamins B12, C, D, thiamine.     ANTHROPOMETRICS  Height: 0  cm (Data Unavailable)  Most Recent Weight: 49 kg (108 lb)  BMI (kg/m ): Underweight BMI < 18.5  Weight History: 01/25/25 : 49 kg (108 lb)   01/12/25 : 49 kg (108 lb)   01/08/25 : 50.9 kg (112 lb 3 oz)   12/29/24 : 49.5 kg (109 lb 3.2 oz)   12/10/24 : 53.5 kg (118 lb) -ED wt, ?stated  09/24/24 : 51.2 kg (112 lb 12.8 oz)   03/08/24 : 49 kg (108 lb)   02/29/24 : 52.2 kg (115 lb)   02/02/23 : 49.6 kg (109 lb 6.4 oz)   11/08/22 : 49.9 kg (110 lb)   08/08/22 : 48.8 kg (107 lb 9.6 oz)     Dosing Weight: 49 kg, based on actual wt    ASSESSED NUTRITION NEEDS  Estimated Energy Needs: 1715+ kcals/day ( 35+ kcal/kg )  Justification: Increased needs  Estimated Protein Needs: 59+ grams protein/day ( 1.2+ g/kg )  Justification: Increased needs  Estimated Fluid Needs: 1470 mL/day ( 30+ml/kg )  Justification: Maintenance    SYSTEM FINDINGS    Per chart  Skin/wounds: no open areas  GI symptoms: Last BM preadmit.    MALNUTRITION  % Intake: No decreased intake noted  % Weight Loss: Weight loss does not meet criteria   Subcutaneous Fat Loss: Orbital: Mild  Muscle Loss: Clavicles (pectoralis and deltoids): Mild  Fluid Accumulation/Edema: None noted  Malnutrition Diagnosis: Patient does not meet two of the established criteria necessary for diagnosing malnutrition but is at risk for malnutrition    NUTRITION DIAGNOSIS  Increased nutrient needs    related to Parkinson's  as evidenced by slow loss of lean body mass    INTERVENTIONS  Medical food supplement therapy-Ensure Enlive 2x/day    Goals  Maintain wt  Patient to consume % of nutritionally adequate meal trays TID, or the equivalent with supplements/snacks.     Monitoring/Evaluation  Progress toward goals will be monitored and evaluated per policy.

## 2025-01-26 NOTE — PROGRESS NOTES
Chart reviewed in detail.  H&P by Dr. Swan from this morning reviewed.  MRI L Spine, Neurology consulted by admitting hospitalist, pt/ot. Likely discharge in 24hrs pending evaluation and treatment.
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
## 2025-01-26 NOTE — ED NOTES
MRI tech returns with pt. Reports that pt was not able to lay flat on the exam table to complete scan due to increased back and right leg pain.    Dr. Cantor updated; SEE ORDERS; ok to hold off on MRI for now.

## 2025-01-27 ENCOUNTER — APPOINTMENT (OUTPATIENT)
Dept: OCCUPATIONAL THERAPY | Facility: HOSPITAL | Age: 65
End: 2025-01-27
Payer: COMMERCIAL

## 2025-01-27 LAB — BACTERIA UR CULT: NORMAL

## 2025-01-27 PROCEDURE — 99231 SBSQ HOSP IP/OBS SF/LOW 25: CPT | Performed by: PSYCHIATRY & NEUROLOGY

## 2025-01-27 PROCEDURE — 96372 THER/PROPH/DIAG INJ SC/IM: CPT | Performed by: HOSPITALIST

## 2025-01-27 PROCEDURE — 96361 HYDRATE IV INFUSION ADD-ON: CPT

## 2025-01-27 PROCEDURE — 250N000013 HC RX MED GY IP 250 OP 250 PS 637: Performed by: HOSPITALIST

## 2025-01-27 PROCEDURE — 250N000011 HC RX IP 250 OP 636: Performed by: HOSPITALIST

## 2025-01-27 PROCEDURE — 250N000013 HC RX MED GY IP 250 OP 250 PS 637: Performed by: INTERNAL MEDICINE

## 2025-01-27 PROCEDURE — 99232 SBSQ HOSP IP/OBS MODERATE 35: CPT | Performed by: INTERNAL MEDICINE

## 2025-01-27 PROCEDURE — 250N000013 HC RX MED GY IP 250 OP 250 PS 637: Performed by: PSYCHIATRY & NEUROLOGY

## 2025-01-27 PROCEDURE — 97535 SELF CARE MNGMENT TRAINING: CPT | Mod: GO

## 2025-01-27 PROCEDURE — G0378 HOSPITAL OBSERVATION PER HR: HCPCS

## 2025-01-27 RX ORDER — ASPIRIN 81 MG/1
162 TABLET ORAL DAILY
Status: DISCONTINUED | OUTPATIENT
Start: 2025-01-27 | End: 2025-01-30 | Stop reason: HOSPADM

## 2025-01-27 RX ORDER — TRAZODONE HYDROCHLORIDE 50 MG/1
100 TABLET, FILM COATED ORAL AT BEDTIME
Status: DISCONTINUED | OUTPATIENT
Start: 2025-01-27 | End: 2025-01-30 | Stop reason: HOSPADM

## 2025-01-27 RX ADMIN — OXYCODONE HYDROCHLORIDE 5 MG: 5 TABLET ORAL at 04:01

## 2025-01-27 RX ADMIN — LEVODOPA AND CARBIDOPA 1 CAPSULE: 195; 48.75 CAPSULE, EXTENDED RELEASE ORAL at 14:31

## 2025-01-27 RX ADMIN — CARBIDOPA AND LEVODOPA 1 TABLET: 25; 100 TABLET ORAL at 16:33

## 2025-01-27 RX ADMIN — CARBIDOPA AND LEVODOPA 1 TABLET: 25; 100 TABLET ORAL at 05:45

## 2025-01-27 RX ADMIN — CARBIDOPA AND LEVODOPA 1 TABLET: 25; 100 TABLET ORAL at 11:16

## 2025-01-27 RX ADMIN — ACETAMINOPHEN 975 MG: 325 TABLET ORAL at 09:10

## 2025-01-27 RX ADMIN — ENOXAPARIN SODIUM 30 MG: 30 INJECTION SUBCUTANEOUS at 09:10

## 2025-01-27 RX ADMIN — ACETAMINOPHEN 975 MG: 325 TABLET ORAL at 14:33

## 2025-01-27 RX ADMIN — TRAZODONE HYDROCHLORIDE 100 MG: 50 TABLET ORAL at 20:55

## 2025-01-27 RX ADMIN — LEVODOPA AND CARBIDOPA 1 CAPSULE: 195; 48.75 CAPSULE, EXTENDED RELEASE ORAL at 20:52

## 2025-01-27 RX ADMIN — ASPIRIN 162 MG: 81 TABLET, COATED ORAL at 09:10

## 2025-01-27 RX ADMIN — THIAMINE HCL TAB 100 MG 100 MG: 100 TAB at 09:10

## 2025-01-27 RX ADMIN — ACETAMINOPHEN 975 MG: 325 TABLET ORAL at 20:52

## 2025-01-27 RX ADMIN — LIDOCAINE 1 PATCH: 4 PATCH TOPICAL at 09:10

## 2025-01-27 RX ADMIN — OXYCODONE HYDROCHLORIDE 5 MG: 5 TABLET ORAL at 14:36

## 2025-01-27 RX ADMIN — LEVODOPA AND CARBIDOPA 1 CAPSULE: 195; 48.75 CAPSULE, EXTENDED RELEASE ORAL at 09:11

## 2025-01-27 ASSESSMENT — ACTIVITIES OF DAILY LIVING (ADL)
ADLS_ACUITY_SCORE: 46
ADLS_ACUITY_SCORE: 43
ADLS_ACUITY_SCORE: 28
ADLS_ACUITY_SCORE: 43
ADLS_ACUITY_SCORE: 28
ADLS_ACUITY_SCORE: 43
ADLS_ACUITY_SCORE: 43
ADLS_ACUITY_SCORE: 46
ADLS_ACUITY_SCORE: 43
ADLS_ACUITY_SCORE: 43
ADLS_ACUITY_SCORE: 28
ADLS_ACUITY_SCORE: 43
ADLS_ACUITY_SCORE: 46
ADLS_ACUITY_SCORE: 43
ADLS_ACUITY_SCORE: 46
ADLS_ACUITY_SCORE: 28
ADLS_ACUITY_SCORE: 43

## 2025-01-27 NOTE — PROGRESS NOTES
[facility] Neurology  [place]    Marlene Merida MRN# [id number]   Age: 64 year old YOB: 1960               Assessment and Plan:      Advanced Parkinson disease  Low back pain with pain radiating into the legs     It sounds like she is still having significant oft times despite using Rytary 3 times a day.  We can add some immediate release carbidopa levodopa in between doses of the Rytary to see if this will help, and if so, she will need to set up reminders for herself to take this complicated schedule.  Exam shows involvement of the legs much more than the arms which fits with her mobility difficulties.    She does have a history of hallucinations, so ropinirole and pramipexole may not be good options for her, at least not without regular dosing of antipsychotic medication.    Physical therapy while here    MRI of the lumbar spine does not show clear cause of radiculopathy.  I suspect the pains in the legs are related to her Parkinson's (nonmotor symptoms).  Ice can help for this, otherwise analgesics symptomatically are appropriate.    I think anxiety is a significant limiting factor for her, it may be worth trying an antianxiety medication if the trazodone is not helping during the day tomorrow.    Okay to discharge home at any point from my perspective.  She does have an appointment with her neurologist on March 24.            Chief Complaint/HPI:     1/27/25: Patient moving a little better today though she is very anxious and this seems to be limiting her.    1/26/25: This patient is a 64-year-old woman with a history significant for Parkinson disease.  She is followed at the [facility].  She is in the more advanced stages of Parkinson's now, requiring increasing doses of carbidopa levodopa.  She was switched to Rytary which is a more controlled release formulation as she was having trouble remembering her doses.  As I review our MAR, it looks like she got a dose around midnight but  none since then.  On a good day she can walk outside with her walker.  She will try to walk inside without assistive device.  On bad days she will stay in the wheelchair.  She is also experiencing significant low back pain that radiates into the legs on both sides.  The pain goes down to the knees anteriorly and goes down to the heels posteriorly.            Past Medical History:    has a past medical history of Parkinson's disease (H).          Past Surgical History:    has a past surgical history that includes Breast surgery and GYN surgery.          Social History:     Social History     Tobacco Use    Smoking status: Never     Passive exposure: Never    Smokeless tobacco: Never   Substance Use Topics    Alcohol use: Yes     Comment: 1-2 beers a month              Family History:     Family History   Problem Relation Age of Onset    Alzheimer Disease Mother     Hypertension Mother     Hypertension Father     Arthritis Father     Diabetes Paternal Grandmother     Diabetes Brother     Breast Cancer No family hx of                 Allergies:   No Known Allergies          Medications:     Current Facility-Administered Medications:     acetaminophen (TYLENOL) tablet 975 mg, 975 mg, Oral, TID, 975 mg at 01/27/25 1433 **OR** acetaminophen (TYLENOL) Suppository 650 mg, 650 mg, Rectal, TID, Gaurav Swan MD    aspirin EC tablet 162 mg, 162 mg, Oral, Daily, Yonatan Love, DO, 162 mg at 01/27/25 0910    benzocaine-menthol (CHLORASEPTIC) 6-10 MG lozenge 1 lozenge, 1 lozenge, Buccal, Q1H PRN, Gaurav Swan MD    calcium carbonate (TUMS) chewable tablet 1,000 mg, 1,000 mg, Oral, 4x Daily PRN, Gaurav Swan MD    carbidopa-levodopa (RYTARY) 48. MG per CR capsule 1 capsule, 1 capsule, Oral, TID, Jhonatan Alvares MD, 1 capsule at 01/27/25 1431    carbidopa-levodopa (SINEMET)  MG per tablet 1 tablet, 1 tablet, Oral, TID, Jhonatan Alvares MD, 1 tablet at 01/27/25 1116    enoxaparin ANTICOAGULANT (LOVENOX)  injection 30 mg, 30 mg, Subcutaneous, Q24H, Gaurav Swan MD, 30 mg at 01/27/25 0910    hydrALAZINE (APRESOLINE) tablet 10 mg, 10 mg, Oral, Q4H PRN **OR** hydrALAZINE (APRESOLINE) injection 10 mg, 10 mg, Intravenous, Q4H PRN, Gaurav Swan MD    Lidocaine (LIDOCARE) 4 % Patch 1 patch, 1 patch, Transdermal, Q24H, Gaurav Swan MD, 1 patch at 01/27/25 0910    lidocaine (LMX4) cream, , Topical, Q1H PRN, Gaurav Swan MD    lidocaine 1 % 0.1-1 mL, 0.1-1 mL, Other, Q1H PRN, Gaurav Swan MD    naloxone (NARCAN) injection 0.2 mg, 0.2 mg, Intravenous, Q2 Min PRN **OR** naloxone (NARCAN) injection 0.4 mg, 0.4 mg, Intravenous, Q2 Min PRN **OR** naloxone (NARCAN) injection 0.2 mg, 0.2 mg, Intramuscular, Q2 Min PRN **OR** naloxone (NARCAN) injection 0.4 mg, 0.4 mg, Intramuscular, Q2 Min PRN, Gaurav Swan MD    ondansetron (ZOFRAN ODT) ODT tab 4 mg, 4 mg, Oral, Q6H PRN **OR** ondansetron (ZOFRAN) injection 4 mg, 4 mg, Intravenous, Q6H PRN, Gaurav Swan MD    oxyCODONE (ROXICODONE) tablet 5 mg, 5 mg, Oral, Q4H PRN, Yonatan Love DO, 5 mg at 01/27/25 1436    oxyCODONE IR (ROXICODONE) half-tab 2.5 mg, 2.5 mg, Oral, Q4H PRN, Yonatan Love DO    polyethylene glycol (MIRALAX) Packet 17 g, 17 g, Oral, Daily, Gaurav Swan MD, 17 g at 01/26/25 0818    senna-docusate (SENOKOT-S/PERICOLACE) 8.6-50 MG per tablet 1 tablet, 1 tablet, Oral, BID PRN **OR** senna-docusate (SENOKOT-S/PERICOLACE) 8.6-50 MG per tablet 2 tablet, 2 tablet, Oral, BID PRN, Gaurav Swan MD    sodium chloride (PF) 0.9% PF flush 3 mL, 3 mL, Intracatheter, Q8H, Gaurav Swan MD, 3 mL at 01/27/25 0911    sodium chloride (PF) 0.9% PF flush 3 mL, 3 mL, Intracatheter, q1 min prn, Gaurav Swan MD    thiamine (B-1) tablet 100 mg, 100 mg, Oral, Daily, Yonatan Love DO, 100 mg at 01/27/25 0910    traZODone (DESYREL) tablet 100 mg, 100 mg, Oral, At Bedtime, Yonatan Love DO              Physical Exam:      Vitals: BP (!)  "160/78 (BP Location: Right arm)   Pulse 84   Temp 98.8  F (37.1  C) (Oral)   Resp 20   Ht 1.676 m (5' 6\")   Wt 50 kg (110 lb 3.7 oz)   LMP  (LMP Unknown)   SpO2 95%   BMI 17.79 kg/m    BMI= Body mass index is 17.79 kg/m .     Patient is alert and in no acute distress. Neck was supple, no carotid bruits, thyromegaly, lymphadenopathy or JVD noted.   Neurological Exam:   Mental status: Patient is alert and oriented x 3. Speech is clear and fluent      CN II: Visual fields are full to confrontation.  PERRLA.   CN III, IV, VI: EOMI.    CN VII: Face is symmetric, mildly reduced facial expression   CN VII: Hearing is normal to conversation   CN IX, X: Palate elevates symmetrically. Phonation is normal no significant dysarthria, .    CN XI: Head turning and shoulder shrug are intact   CN XII: Tongue is midline with normal movements and no atrophy or fasciculations.   Motor: no pronator drift. Strength is 5/5 bilaterally. No fasciculations noted.   Reflexes: Reflexes are symmetric, quite brisk throughout   Sensory: Light touch, pinprick sense are intact bilaterally.    Coordination: Rapid alternating movements and fine finger movements are  actually fairly good in the arms.  She has significant difficulty initiating movement in the legs   Gait: able to walk with full assist     Jhonatan Alvares MD       More than 25 minutes spent reviewing records and results, evaluating patient, discussing with pt and on documentation    "

## 2025-01-27 NOTE — PROGRESS NOTES
Care Management Follow Up    Length of Stay (days): 1    Expected Discharge Date: 1/27/2025      Referrals Placed by CM/SW:    (In order of preference)  1. Wanda at [facility]  2. [facility]  3. Kathi at [place]  4. [facility]    Private pay costs discussed:  no    Discussed  Partnership in Safe Discharge Planning  document with patient/family: No     Handoff Completed: No, handoff not indicated or clinically appropriate    Additional Information:  Received call back from Marlene's daughter Bibiana. Her and her mom talked and they came up with the above choices for TCU therefore referrals sent at 8:07 pm by this writer.    Next Steps: follow up to see if have accepting TCU.     Comfort Worthy RN  [facility] ED Care Manager  [phone]

## 2025-01-27 NOTE — PLAN OF CARE
PRIMARY DIAGNOSIS: ACUTE PAIN  OUTPATIENT/OBSERVATION GOALS TO BE MET BEFORE DISCHARGE:  1. Pain Status: Improved-controlled with oral pain medications.    2. Return to near baseline physical activity: No    3. Cleared for discharge by consultants (if involved): No    Discharge Planner Nurse   Safe discharge environment identified: No  Barriers to discharge:  Unknown at this time.       Entered by: Leena Oakes RN 01/27/2025 2:14 PM     Please review provider order for any additional goals.   Nurse to notify provider when observation goals have been met and patient is ready for discharge.Goal Outcome Evaluation:       Patient had more pain this morning 8/10.Radiating down both legs. Gave scheduled meds, lidocaine patch and ice and patient had good improvement. Patient gets up with assist of one, gait belt, and walker. Patient needs to have tabs alarm on chair and bed alarm on as she will get up without help. Patient is forgetful but AxOx4.

## 2025-01-27 NOTE — PLAN OF CARE
"Goal Outcome Evaluation:      Problem: Adult Inpatient Plan of Care  Goal: Patient-Specific Goal (Individualized)  Description: You can add care plan individualizations to a care plan. Examples of Individualization might be:  \"Parent requests to be called daily at 9am for status\", \"I have a hard time hearing out of my right ear\", or \"Do not touch me to wake me up as it startles  me\".  Outcome: Progressing     Problem: Adult Inpatient Plan of Care  Goal: Optimal Comfort and Wellbeing  Outcome: Progressing     Problem: Adult Inpatient Plan of Care  Goal: Absence of Hospital-Acquired Illness or Injury  Outcome: Progressing    A/O x4 with occasional confusion. BP 92/50 this afternoon. Provider was updated and order received for 1000 ml NS bolus over 6 hours. PRN Oxycodone given back pain which was effective per pt. High fall risk. Bed and chair alarms on all time. Assist of one with ADLs. Continent of B&B.                            "

## 2025-01-27 NOTE — PLAN OF CARE
Problem: Adult Inpatient Plan of Care  Goal: Plan of Care Review  Description: The Plan of Care Review/Shift note should be completed every shift.  The Outcome Evaluation is a brief statement about your assessment that the patient is improving, declining, or no change.  This information will be displayed automatically on your shift  note.  Outcome: Progressing   Goal Outcome Evaluation:      VSS. Pt able to follow some conversation and answer some questions appropriately, but otherwise makes very bouncy conversation and comments. Pt was able to stand around 2200 with maximum assist, but unable to take steps with feet at this time, purewick was placed for pt to urinate. Pt called often overnight giving the impression she was unable to move, but when this writer really provided instruction and encouragement pt would move upper and lower extremities by herself and even help reposition and boost self in bed. PRN meds given for back and leg pain.

## 2025-01-27 NOTE — PROGRESS NOTES
"[facility]    Medicine Progress Note - Hospitalist Service    Date of Admission:  1/25/2025    Assessment & Plan     64 year old female with a pertinent history of Parkinson's disease who presents to this ED via EMS for evaluation of generalized weakness and low back pain.    # Acute bilateral low back pain with bilateral sciatica   -MRI L spine no acute findings  -multimodal pain control  -Neurology assistance appreciated.  -PT/OT    # Parkinson's disease  -Ashley Leiva  -Neurology assistance appreciated    # Prerenal azotemia, resolved  -encourage fluids    # Underweight  -BMI 17.43          Diet: Combination Diet Regular Diet Adult  Snacks/Supplements Adult: Ensure Enlive; With Meals    DVT Prophylaxis: Enoxaparin (Lovenox) SQ  Gilliam Catheter: Not present  Lines: None     Cardiac Monitoring: None  Code Status: Full Code      Clinically Significant Risk Factors          # Hyperchloremia: Highest Cl = 109 mmol/L in last 2 days, will monitor as appropriate                         # Cachexia: Estimated body mass index is 17.43 kg/m  as calculated from the following:    Height as of 1/12/25: 1.676 m (5' 6\").    Weight as of this encounter: 49 kg (108 lb)., PRESENT ON ADMISSION     # Financial/Environmental Concerns: none         Social Drivers of Health    Food Insecurity: High Risk (1/13/2025)    Food Insecurity     Within the past 12 months, did you worry that your food would run out before you got money to buy more?: Yes     Within the past 12 months, did the food you bought just not last and you didn t have money to get more?: Yes   Physical Activity: Inactive (1/13/2025)    Exercise Vital Sign     Days of Exercise per Week: 0 days     Minutes of Exercise per Session: 0 min   Interpersonal Safety: Patient Declined (12/27/2024)    Received from [employer]    Humiliation, Afraid, Rape, and Kick questionnaire     Fear of Current or Ex-Partner: Patient declined     Emotionally Abused: " Patient declined     Physically Abused: Patient declined     Sexually Abused: Patient declined   Stress: Stress Concern Present (1/13/2025)    [employer] [place] [employer] - Occupational Stress Questionnaire     Feeling of Stress : To some extent   Social Connections: Moderately Isolated (1/13/2025)    Social Connection and Isolation Panel [NHANES]     Frequency of Communication with Friends and Family: Three times a week     Frequency of Social Gatherings with Friends and Family: Once a week     Attends Church Services: 1 to 4 times per year     Active Member of Clubs or Organizations: No     Attends Club or Organization Meetings: Never     Marital Status:           Disposition Plan     Medically Ready for Discharge: Anticipated Tomorrow             Yonatan Love DO, DO  Hospitalist Service  [facility]  Securely message with [employer] (more info)  Text page via [employer] Paging/Directory   ______________________________________________________________________    Interval History     Afebrile. No acute events overnight.    Physical Exam   Vital Signs: Temp: 98  F (36.7  C) Temp src: Oral BP: (!) 149/63 Pulse: 80   Resp: 16 SpO2: 97 % O2 Device: None (Room air)    Weight: 108 lbs 0 oz    General appearance - alert, and in no distress  Eyes - sclera anicteric  Lungs - clear to auscultation, no wheezes, rales or rhonchi, symmetric air entry  Heart - normal rate, regular rhythm, normal S1, S2, no murmurs, rubs, clicks or gallops. No peripheral edema.  Abdomen - soft, nontender, nondistended, BS+  Neurological - alert, oriented, normal speech, no focal findings or movement disorder noted  Skin - no c/c/p    Lab/imaging reviewed

## 2025-01-27 NOTE — PROGRESS NOTES
Report called to Luiz VELAZQUEZ. Patient transferred to [facility] via wheelchair. Patient has all belongings.

## 2025-01-27 NOTE — PROGRESS NOTES
"Care Management Follow Up    Length of Stay (days): 1    Expected Discharge Date: 01/28/2025     Concerns to be Addressed: discharge planning to TCU.  Patient plan of care discussed at interdisciplinary rounds: No    Anticipated Discharge Disposition:    To TCU.      Anticipated Discharge Services:  TCU  Anticipated Discharge DME:  nothing new.    Patient/family educated on Medicare website which has current facility and service quality ratings:  yes, has list and daughter Bibiana notified of Medicare.gov via phone.  Education Provided on the Discharge Plan:  yes, tavon Mccarty called 1115 and updated her on the current TCUs that have declined and pending.  Patient/Family in Agreement with the Plan:  yes    Referrals Placed by CM/SW:  yes  Private pay costs discussed: Not applicable    Discussed  Partnership in Safe Discharge Planning  document with patient/family: No     Handoff Completed: No, handoff not indicated or clinically appropriate    Additional Information:  PT and OT are recommending TCU.    Referrals placed to TCU 1/26/25 to: (in this order)  1. [facility]  2. [facility]  3. [facility]  4. [facility]    I updated the daughter Bibiana when she called that we have heard that both [facility] and [facility] have declined. And that [facility] is considering and [facility] is pending.    Tavon Mccarty stated, \"I am working with [facility] to begin the search for assisted living for my Mom. I am working with them today and I am just wondering what an average TCU stay length would be\". I answered her questions.    Care Coordination referral sent.    CM to follow for medical progression of care, discharge recommendations, and final discharge plan.    RAUSCH was given and discussed with daughter Bibiana Smith via phone. All questions were answered.    Next Steps: Just awaiting TCU acceptance. Daughter " "Bibiana [phone] states, \"I want to hear back about the 2 pending TCUs, but if more referral requests are needed please let me know and I will give you more options.     Komal Willis RN    "

## 2025-01-28 ENCOUNTER — APPOINTMENT (OUTPATIENT)
Dept: OCCUPATIONAL THERAPY | Facility: HOSPITAL | Age: 65
End: 2025-01-28
Payer: COMMERCIAL

## 2025-01-28 PROCEDURE — G0378 HOSPITAL OBSERVATION PER HR: HCPCS

## 2025-01-28 PROCEDURE — 250N000013 HC RX MED GY IP 250 OP 250 PS 637: Performed by: HOSPITALIST

## 2025-01-28 PROCEDURE — 97535 SELF CARE MNGMENT TRAINING: CPT | Mod: GO

## 2025-01-28 PROCEDURE — 99232 SBSQ HOSP IP/OBS MODERATE 35: CPT | Performed by: INTERNAL MEDICINE

## 2025-01-28 PROCEDURE — 250N000011 HC RX IP 250 OP 636: Performed by: HOSPITALIST

## 2025-01-28 PROCEDURE — 96372 THER/PROPH/DIAG INJ SC/IM: CPT | Performed by: HOSPITALIST

## 2025-01-28 PROCEDURE — 250N000013 HC RX MED GY IP 250 OP 250 PS 637: Performed by: INTERNAL MEDICINE

## 2025-01-28 PROCEDURE — 250N000013 HC RX MED GY IP 250 OP 250 PS 637: Performed by: PSYCHIATRY & NEUROLOGY

## 2025-01-28 RX ADMIN — ACETAMINOPHEN 975 MG: 325 TABLET ORAL at 21:22

## 2025-01-28 RX ADMIN — CARBIDOPA AND LEVODOPA 1 TABLET: 25; 100 TABLET ORAL at 09:46

## 2025-01-28 RX ADMIN — LEVODOPA AND CARBIDOPA 1 CAPSULE: 195; 48.75 CAPSULE, EXTENDED RELEASE ORAL at 21:21

## 2025-01-28 RX ADMIN — POLYETHYLENE GLYCOL 3350 17 G: 17 POWDER, FOR SOLUTION ORAL at 08:41

## 2025-01-28 RX ADMIN — LIDOCAINE 1 PATCH: 4 PATCH TOPICAL at 08:41

## 2025-01-28 RX ADMIN — ACETAMINOPHEN 975 MG: 325 TABLET ORAL at 13:25

## 2025-01-28 RX ADMIN — LEVODOPA AND CARBIDOPA 1 CAPSULE: 195; 48.75 CAPSULE, EXTENDED RELEASE ORAL at 13:25

## 2025-01-28 RX ADMIN — LEVODOPA AND CARBIDOPA 1 CAPSULE: 195; 48.75 CAPSULE, EXTENDED RELEASE ORAL at 09:11

## 2025-01-28 RX ADMIN — CARBIDOPA AND LEVODOPA 1 TABLET: 25; 100 TABLET ORAL at 17:08

## 2025-01-28 RX ADMIN — OXYCODONE HYDROCHLORIDE 5 MG: 5 TABLET ORAL at 03:01

## 2025-01-28 RX ADMIN — ASPIRIN 162 MG: 81 TABLET, COATED ORAL at 08:41

## 2025-01-28 RX ADMIN — THIAMINE HCL TAB 100 MG 100 MG: 100 TAB at 08:43

## 2025-01-28 RX ADMIN — CARBIDOPA AND LEVODOPA 1 TABLET: 25; 100 TABLET ORAL at 05:44

## 2025-01-28 RX ADMIN — TRAZODONE HYDROCHLORIDE 100 MG: 50 TABLET ORAL at 21:21

## 2025-01-28 RX ADMIN — ENOXAPARIN SODIUM 30 MG: 30 INJECTION SUBCUTANEOUS at 08:41

## 2025-01-28 RX ADMIN — ACETAMINOPHEN 975 MG: 325 TABLET ORAL at 08:41

## 2025-01-28 ASSESSMENT — ACTIVITIES OF DAILY LIVING (ADL)
ADLS_ACUITY_SCORE: 40
ADLS_ACUITY_SCORE: 29
ADLS_ACUITY_SCORE: 28
ADLS_ACUITY_SCORE: 38
ADLS_ACUITY_SCORE: 29
ADLS_ACUITY_SCORE: 29
ADLS_ACUITY_SCORE: 28
ADLS_ACUITY_SCORE: 29
ADLS_ACUITY_SCORE: 38
ADLS_ACUITY_SCORE: 28
ADLS_ACUITY_SCORE: 40
ADLS_ACUITY_SCORE: 28
ADLS_ACUITY_SCORE: 28
ADLS_ACUITY_SCORE: 29
ADLS_ACUITY_SCORE: 40
ADLS_ACUITY_SCORE: 28
ADLS_ACUITY_SCORE: 39
ADLS_ACUITY_SCORE: 40
ADLS_ACUITY_SCORE: 29
ADLS_ACUITY_SCORE: 28
ADLS_ACUITY_SCORE: 28

## 2025-01-28 NOTE — PLAN OF CARE
PRIMARY DIAGNOSIS: ACUTE PAIN  OUTPATIENT/OBSERVATION GOALS TO BE MET BEFORE DISCHARGE:  1. Pain Status: Improved-controlled with oral pain medications.    2. Return to near baseline physical activity: No    3. Cleared for discharge by consultants (if involved): No    Discharge Planner Nurse   Safe discharge environment identified: No  Barriers to discharge: Yes       Entered by: Nani Wilson RN 01/28/2025 1:18 PM     Please review provider order for any additional goals.   Nurse to notify provider when observation goals have been met and patient is ready for discharge.

## 2025-01-28 NOTE — PLAN OF CARE
PRIMARY DIAGNOSIS: ACUTE PAIN  OUTPATIENT/OBSERVATION GOALS TO BE MET BEFORE DISCHARGE:  1. Pain Status: Improved-controlled with oral pain medications.    2. Return to near baseline physical activity: No    3. Cleared for discharge by consultants (if involved): No    Discharge Planner Nurse   Safe discharge environment identified: No  Barriers to discharge: No       Entered by: Nani Wilson RN 01/28/2025 1:19 PM     Please review provider order for any additional goals.   Nurse to notify provider when observation goals have been met and patient is ready for discharge.

## 2025-01-28 NOTE — PLAN OF CARE
"PRIMARY DIAGNOSIS: \"GENERIC\" NURSING  OUTPATIENT/OBSERVATION GOALS TO BE MET BEFORE DISCHARGE:  ADLs back to baseline: No    Activity and level of assistance: Up with standby assistance.    Pain status: Improved-controlled with oral pain medications.    Return to near baseline physical activity: No     Discharge Planner Nurse   Safe discharge environment identified: No  Barriers to discharge: Yes       Entered by: Oluwadamilola Adegun, RN 01/27/2025 10:42 PM     Please review provider order for any additional goals.   Nurse to notify provider when observation goals have been met and patient is ready for discharge.Goal Outcome Evaluation:      Plan of Care Reviewed With: patient                 "

## 2025-01-28 NOTE — PROGRESS NOTES
[facility]    Hospitalist Progress Note    Assessment & Plan   64 year old female who was admitted on 1/25/2025 with back pain.     Impression:   Principal Problem:    Acute low back pain w bilateral sciatica  Active Problems:    Advanced Parkinson's disease (H)    Walker as ambulation aid    Underweight -- BMI 17.8      Plan:  improving, continue PT and OT    DVT Prophylaxis: Low Risk/Ambulatory with no VTE prophylaxis indicated  Code Status: Full Code    Disposition: Expected discharge home tomorrow     Jose Manuel Javier MD  Pager [phone]  Cell Phone [phone]  Text Page (7am to 6pm)    Interval History   Back pain improving.  Has bilateral leg pain, right pain worse than left     Physical Exam   Temp: 97.8  F (36.6  C) Temp src: Oral BP: 121/61 Pulse: 97   Resp: 20 SpO2: 97 % O2 Device: None (Room air)    Vitals:    01/25/25 2204 01/27/25 1457   Weight: 49 kg (108 lb) 50 kg (110 lb 3.7 oz)     Vital Signs with Ranges  Temp:  [97.8  F (36.6  C)-98.3  F (36.8  C)] 97.8  F (36.6  C)  Pulse:  [71-97] 97  Resp:  [16-20] 20  BP: (115-151)/(59-72) 121/61  SpO2:  [95 %-98 %] 97 %  I/O last 3 completed shifts:  In: 320 [P.O.:320]  Out: -     # Pain Assessment:      1/28/2025     8:41 AM   Current Pain Score   Patient currently in pain? yes   - Marlene is experiencing pain due to back. Pain management was discussed and the plan was created in a collaborative fashion.  Marlene's response to the current recommendations: engaged  - Please see the plan for pain management as documented above      Constitutional: Awake, alert, cooperative, no apparent distress  Respiratory: Clear to auscultation bilaterally, no crackles or wheezing  Cardiovascular: Regular rate and rhythm, normal S1 and S2, and no murmur noted  GI: Normal bowel sounds, soft, non-distended, non-tender  Extrem: No calf tenderness, no ankle edema  Neuro: Ox3, no focal motor or sensory deficits.  Bilateral straight leg raise limited  by hamstring tightness at 45 degrees with both legs.     Medications   Current Facility-Administered Medications   Medication Dose Route Frequency Provider Last Rate Last Admin     Current Facility-Administered Medications   Medication Dose Route Frequency Provider Last Rate Last Admin    acetaminophen (TYLENOL) tablet 975 mg  975 mg Oral TID Gaurav Swan MD   975 mg at 01/28/25 1325    Or    acetaminophen (TYLENOL) Suppository 650 mg  650 mg Rectal TID Gaurav Swan MD        aspirin EC tablet 162 mg  162 mg Oral Daily Yonatan Love DO   162 mg at 01/28/25 0841    carbidopa-levodopa (RYTARY) 48. MG per CR capsule 1 capsule  1 capsule Oral TID Jhonatan Alvares MD   1 capsule at 01/28/25 1325    carbidopa-levodopa (SINEMET)  MG per tablet 1 tablet  1 tablet Oral TID Jhonatan Alvares MD   1 tablet at 01/28/25 0946    enoxaparin ANTICOAGULANT (LOVENOX) injection 30 mg  30 mg Subcutaneous Q24H Garuav Swan MD   30 mg at 01/28/25 0841    Lidocaine (LIDOCARE) 4 % Patch 1 patch  1 patch Transdermal Q24H Gaurav Swan MD   1 patch at 01/28/25 0841    polyethylene glycol (MIRALAX) Packet 17 g  17 g Oral Daily Gaurav Swan MD   17 g at 01/28/25 0841    sodium chloride (PF) 0.9% PF flush 3 mL  3 mL Intracatheter Q8H Gaurav Swan MD   3 mL at 01/28/25 0853    thiamine (B-1) tablet 100 mg  100 mg Oral Daily Yonatan Love DO   100 mg at 01/28/25 0843    traZODone (DESYREL) tablet 100 mg  100 mg Oral At Bedtime Yonatan Love DO   100 mg at 01/27/25 2055       Data   Recent Labs   Lab 01/26/25  0820 01/25/25  2232   WBC 7.1 9.7   HGB 14.5 14.2   MCV 97 95    326    143   POTASSIUM 4.0 4.2   CHLORIDE 108* 109*   CO2 19* 25   BUN 22.1 27.5*   CR 0.47* 0.50*   ANIONGAP 13 9   TITO 9.0 9.7   * 106*   ALBUMIN  --  4.1   PROTTOTAL  --  7.0   BILITOTAL  --  0.4   ALKPHOS  --  109   ALT  --  27   AST  --  35       Imaging:   No results found for this or any previous visit  (from the past 24 hours).

## 2025-01-28 NOTE — PLAN OF CARE
"  Problem: Adult Inpatient Plan of Care  Goal: Plan of Care Review  Description: The Plan of Care Review/Shift note should be completed every shift.  The Outcome Evaluation is a brief statement about your assessment that the patient is improving, declining, or no change.  This information will be displayed automatically on your shift  note.  Outcome: Progressing     Problem: Adult Inpatient Plan of Care  Goal: Patient-Specific Goal (Individualized)  Description: You can add care plan individualizations to a care plan. Examples of Individualization might be:  \"Parent requests to be called daily at 9am for status\", \"I have a hard time hearing out of my right ear\", or \"Do not touch me to wake me up as it startles  me\".  Outcome: Progressing     Problem: Delirium  Goal: Improved Sleep  Outcome: Not Progressing     Problem: Pain Acute  Goal: Optimal Pain Control and Function  Outcome: Progressing   Goal Outcome Evaluation:  Pt alert and oriented but forgetful. VSS. Pt always in pain, minimal, cold compress, warm compress applied to bilateral legs. At 03AM given Oxy 5mg for pt claimed pain is escalating 8/10 on her back. Pt seated to her reclining chair with ice packs on her legs. Appears comfortable watching TV.                      "

## 2025-01-28 NOTE — CONSULTS
Care Management Follow Up    Length of Stay (days): 1    Expected Discharge Date: 01/28/2025    Anticipated Discharge Plan:   transitional care    Transportation: Anticipate Family/friend    PT Recommendations: Transitional Care Facility  OT Recommendations:  Transitional Care Facility     Barriers to Discharge: placement    Prior Living Situation: independent living facility with alone    Discussed  Partnership in Safe Discharge Planning  document with patient/family: No     Handoff Completed: No, handoff not indicated or clinically appropriate    Patient/Spokesperson Updated: Yes. Who? Dtr Bibiana    Additional Information:  Placement pending.   SW spoke to daughter Bibiana to discuss additional choices due to limited or no bed availability at selected choices. [facility] referrals sent.     Multiple SDOH concerns documented by bedside RN on 1/27, Message sent to verify. Pt's adult children are involved and supportive, they are working on finding long term placement, likely assisted living and helping patient navigate this process. Daughter confirms she has FMLA available to help pt with all transportation to medical appointments. Patient's son is assisting with food and grocery shopping. Pt also follows with [facility] Care Coordinator for resources. Daughter reports she has also reached out to [employer] line to begin Medical assistance application.   Next Steps: TCU availability.    Sharon Covarrubias, LICSW

## 2025-01-28 NOTE — PLAN OF CARE
"PRIMARY DIAGNOSIS: \"GENERIC\" NURSING  OUTPATIENT/OBSERVATION GOALS TO BE MET BEFORE DISCHARGE:  ADLs back to baseline: No    Activity and level of assistance: Up with standby assistance.    Pain status: Improved-controlled with oral pain medications.    Return to near baseline physical activity: No     Discharge Planner Nurse   Safe discharge environment identified: No  Barriers to discharge: Yes       Entered by: Oluwadamilola Adegun, RN 01/27/2025 10:42 PM     Please review provider order for any additional goals.   Nurse to notify provider when observation goals have been met and patient is ready for discharge.Goal Outcome Evaluation: Pt alert and oriented, intermittent confusion noted and forgetful with call light use. VSS, pain managed with scheduled tylenol.       Plan of Care Reviewed With: patient                 "

## 2025-01-29 LAB
CREAT SERPL-MCNC: 0.46 MG/DL (ref 0.51–0.95)
EGFRCR SERPLBLD CKD-EPI 2021: >90 ML/MIN/1.73M2
PLATELET # BLD AUTO: 227 10E3/UL (ref 150–450)

## 2025-01-29 PROCEDURE — 99232 SBSQ HOSP IP/OBS MODERATE 35: CPT | Performed by: INTERNAL MEDICINE

## 2025-01-29 PROCEDURE — 250N000013 HC RX MED GY IP 250 OP 250 PS 637: Performed by: INTERNAL MEDICINE

## 2025-01-29 PROCEDURE — 250N000013 HC RX MED GY IP 250 OP 250 PS 637: Performed by: PSYCHIATRY & NEUROLOGY

## 2025-01-29 PROCEDURE — 85049 AUTOMATED PLATELET COUNT: CPT | Performed by: INTERNAL MEDICINE

## 2025-01-29 PROCEDURE — 96372 THER/PROPH/DIAG INJ SC/IM: CPT | Performed by: HOSPITALIST

## 2025-01-29 PROCEDURE — 82565 ASSAY OF CREATININE: CPT | Performed by: INTERNAL MEDICINE

## 2025-01-29 PROCEDURE — 250N000011 HC RX IP 250 OP 636: Performed by: HOSPITALIST

## 2025-01-29 PROCEDURE — 36415 COLL VENOUS BLD VENIPUNCTURE: CPT | Performed by: INTERNAL MEDICINE

## 2025-01-29 PROCEDURE — G0378 HOSPITAL OBSERVATION PER HR: HCPCS

## 2025-01-29 PROCEDURE — 250N000013 HC RX MED GY IP 250 OP 250 PS 637: Performed by: HOSPITALIST

## 2025-01-29 RX ADMIN — TRAZODONE HYDROCHLORIDE 100 MG: 50 TABLET ORAL at 21:17

## 2025-01-29 RX ADMIN — LEVODOPA AND CARBIDOPA 1 CAPSULE: 195; 48.75 CAPSULE, EXTENDED RELEASE ORAL at 13:44

## 2025-01-29 RX ADMIN — LEVODOPA AND CARBIDOPA 1 CAPSULE: 195; 48.75 CAPSULE, EXTENDED RELEASE ORAL at 08:10

## 2025-01-29 RX ADMIN — POLYETHYLENE GLYCOL 3350 17 G: 17 POWDER, FOR SOLUTION ORAL at 08:09

## 2025-01-29 RX ADMIN — THIAMINE HCL TAB 100 MG 100 MG: 100 TAB at 08:10

## 2025-01-29 RX ADMIN — ACETAMINOPHEN 975 MG: 325 TABLET ORAL at 08:11

## 2025-01-29 RX ADMIN — CARBIDOPA AND LEVODOPA 1 TABLET: 25; 100 TABLET ORAL at 10:31

## 2025-01-29 RX ADMIN — ACETAMINOPHEN 975 MG: 325 TABLET ORAL at 19:53

## 2025-01-29 RX ADMIN — LIDOCAINE 1 PATCH: 4 PATCH TOPICAL at 08:09

## 2025-01-29 RX ADMIN — ASPIRIN 162 MG: 81 TABLET, COATED ORAL at 08:10

## 2025-01-29 RX ADMIN — ACETAMINOPHEN 975 MG: 325 TABLET ORAL at 13:43

## 2025-01-29 RX ADMIN — CARBIDOPA AND LEVODOPA 1 TABLET: 25; 100 TABLET ORAL at 05:00

## 2025-01-29 RX ADMIN — OXYCODONE HYDROCHLORIDE 2.5 MG: 5 TABLET ORAL at 16:50

## 2025-01-29 RX ADMIN — ENOXAPARIN SODIUM 30 MG: 30 INJECTION SUBCUTANEOUS at 08:10

## 2025-01-29 RX ADMIN — LEVODOPA AND CARBIDOPA 1 CAPSULE: 195; 48.75 CAPSULE, EXTENDED RELEASE ORAL at 19:54

## 2025-01-29 RX ADMIN — OXYCODONE HYDROCHLORIDE 5 MG: 5 TABLET ORAL at 04:59

## 2025-01-29 RX ADMIN — CARBIDOPA AND LEVODOPA 1 TABLET: 25; 100 TABLET ORAL at 16:27

## 2025-01-29 ASSESSMENT — ACTIVITIES OF DAILY LIVING (ADL)
ADLS_ACUITY_SCORE: 39
ADLS_ACUITY_SCORE: 47
ADLS_ACUITY_SCORE: 39
ADLS_ACUITY_SCORE: 47
ADLS_ACUITY_SCORE: 39
ADLS_ACUITY_SCORE: 40
ADLS_ACUITY_SCORE: 47
ADLS_ACUITY_SCORE: 40
ADLS_ACUITY_SCORE: 47
ADLS_ACUITY_SCORE: 47
ADLS_ACUITY_SCORE: 39
ADLS_ACUITY_SCORE: 40
ADLS_ACUITY_SCORE: 47

## 2025-01-29 NOTE — CONSULTS
Care Management Follow Up    Length of Stay (days): 1    Expected Discharge Date: 01/29/2025    Anticipated Discharge Plan:   Transitional care    Transportation: TBD- Private pay costs for transportation discussed with daughter Bibiana on 1/29  by phone. She is aware of estimated wheelchair ride costs and states understanding.     PT Recommendations: Transitional Care Facility  OT Recommendations:  (S) Transitional Care Facility     Barriers to Discharge: placement    Prior Living Situation: independent living facility with alone    Discussed  Partnership in Safe Discharge Planning  document with patient/family: Yes:      Handoff Completed: No, handoff not indicated or clinically appropriate    Patient/Spokesperson Updated: Yes. Who? Daughter Bibiana updated    Additional Information:  Patient medically ready for discharge, updates discussed with MD. TCU is the goal.  HARDIK called and updated patient's daughter Bibiana by phone today. Additional TCU referral sent after initial choices were full.   Family may be able to transport at discharge, follow up when facility found.   Next Steps: Tcu availability.  Sharon Covarrubias, LICSW

## 2025-01-29 NOTE — PLAN OF CARE
"Goal Outcome Evaluation:PRIMARY DIAGNOSIS: \"GENERIC\" NURSING  OUTPATIENT/OBSERVATION GOALS TO BE MET BEFORE DISCHARGE:  ADLs back to baseline: No    Activity and level of assistance: Up with maximum assistance. Consider SW and/or PT evaluation.     Pain status: Improved-controlled with oral pain medications.    Return to near baseline physical activity: No     Discharge Planner Nurse   Safe discharge environment identified: No  Barriers to discharge: No       Entered by: Oluwadamilola Adegun, RN 01/28/2025 10:20 PM     Please review provider order for any additional goals.   Nurse to notify provider when observation goals have been met and patient is ready for discharge.      Plan of Care Reviewed With: patient                 "

## 2025-01-29 NOTE — CONSULTS
Pt has been accepted to [facility]CU in [place], confirmed with Charleen in admissions. Bed is available at 1:00pm on 1/30.   Family, daughter Bibiana updated by phone and is accepting of bed. Family unable to transport and requesting wheelchair ride.  Wheelchair ride scheduled for 12:40pm to 1:20pm.     Sharon Covarrubias, LICSW  1/29/2025

## 2025-01-29 NOTE — PLAN OF CARE
PRIMARY DIAGNOSIS: GENERALIZED WEAKNESS    OUTPATIENT/OBSERVATION GOALS TO BE MET BEFORE DISCHARGE  1. Orthostatic performed: N/A    2. Tolerating PO medications: Yes    3. Return to near baseline physical activity: No    4. Cleared for discharge by consultants (if involved): Yes    Discharge Planner Nurse   Safe discharge environment identified:  Home with HC vs TCU  Barriers to discharge: Yes  Increase in weakness. PT/OT. Pain control       Entered by: Yun Conner RN 01/29/2025 8:15 AM     Please review provider order for any additional goals.   Nurse to notify provider when observation goals have been met and patient is ready for discharge.Goal Outcome Evaluation:

## 2025-01-29 NOTE — UTILIZATION REVIEW
Concurrent stay review; Secondary Review Determination - [facility]        Under the authority of the Utilization Management Committee, the utilization review process indicated a secondary review on the above patient.  The review outcome is based on review of the medical records, discussions with staff, and applying clinical experience noted on the date of the review.        (x) Outpatient [facility] status is appropriate       RATIONALE FOR DETERMINATION:     65 yo female with PMH of advanced parkinson's disease who presents to the ED with acute LBP with bilateral sciatica.  Pain is improving with po prn medications.  She is medically ready for hospital discharge; remains in the hospital for safe discharge planning and placement.    Patient delayed discharge is related to disposition, there is no medical necessity for inpatient admission at the time of this review. If there is a change in patient status, please resend for review.    The information on this document is developed by the utilization review team in order for the business office to ensure compliance.  This only denotes the appropriateness of proper admission status and does not reflect the quality of care rendered.       The definitions of Inpatient Status and Observation Status used in making the determination above are those provided in the CMS Coverage Manual, Chapter 1 and Chapter 6, section 70.4.       Sincerely,    Bibiana Barbosa, DO

## 2025-01-29 NOTE — PLAN OF CARE
"PRIMARY DIAGNOSIS: \"GENERIC\" NURSING  OUTPATIENT/OBSERVATION GOALS TO BE MET BEFORE DISCHARGE:  ADLs back to baseline: No    Activity and level of assistance: Up with maximum assistance. Consider SW and/or PT evaluation.     Pain status: Improved-controlled with oral pain medications.    Return to near baseline physical activity: No     Discharge Planner Nurse   Safe discharge environment identified: No  Barriers to discharge: Yes       Entered by: Oluwadamilola Adegun, RN 01/28/2025 10:21 PM     Please review provider order for any additional goals.   Nurse to notify provider when observation goals have been met and patient is ready for discharge.Goal Outcome Evaluation: Pt alert and oriented, intermittent confusion and forgetfulness noted. Pain managed with scheduled tylenol.       Plan of Care Reviewed With: patient                 "

## 2025-01-29 NOTE — PROGRESS NOTES
CLINICAL NUTRITION SERVICES - REASSESSMENT NOTE     RECOMMENDATIONS FOR MDs/PROVIDERS TO ORDER:  none    Malnutrition Status:     Patient does not meet two of the established criteria necessary for diagnosing malnutrition but is at risk for malnutrition    Registered Dietitian Interventions:  RD will sign off with pt being observation status, consistent good intake > estimated needs  Continue ensure bid to promote weight gain    Future/Additional Recommendations:  Adjust supplements pending intake, weight, tolerance, acceptance       SUBJECTIVE INFORMATION  Assessed patient in room.  Pt reports her meals are going well. She enjoys the food and has been taking the ensure. She reports at times she will get sent a 3rd ensure and she drinks it also. She loves the ensure.     CURRENT NUTRITION ORDERS  Diet: Orders Placed This Encounter      Combination Diet Regular Diet Adult      Nutrition Support: ensure enlive bid = 700 kcal, 40 g protein    CURRENT INTAKE/TOLERANCE  Eating % of 3 meal/day  Estimate intake with 2 ensure/day at 3357-6579 kcal, 66-79 g protein  Supplement intake not documented    Pt is meeting 100% of her increased nutrition needs for weight gain with meals and supplements         NEW FINDINGS  Weight: Wt up 2 lb favorably  Date/Time Weight Weight Method   01/27/25 1457 50 kg (110 lb 3.7 oz) Bed scale   01/25/25 2204 49 kg (108 lb) --   01/12/25 : 49 kg (108 lb)   01/08/25 : 50.9 kg (112 lb 3 oz)   12/29/24 : 49.5 kg (109 lb 3.2 oz)   12/10/24 : 53.5 kg (118 lb) -ED wt, ?stated  09/24/24 : 51.2 kg (112 lb 12.8 oz)   03/08/24 : 49 kg (108 lb)   02/29/24 : 52.2 kg (115 lb)   02/02/23 : 49.6 kg (109 lb 6.4 oz)   11/08/22 : 49.9 kg (110 lb)   08/08/22 : 48.8 kg (107 lb 9.6 oz)     GI symptoms: last BM 1/28  Nutrition-relevant labs: No concerns  Nutrition-relevant medications: rytary, sinemet, miralax daily, thiamine daily    Dosing Weight: 49 kg, based on actual wt     ASSESSED NUTRITION  NEEDS  Estimated Energy Needs: 1715+ kcals/day ( 35+ kcal/kg )  Justification: Increased needs  Estimated Protein Needs: 59+ grams protein/day ( 1.2+ g/kg )  Justification: Increased needs  Estimated Fluid Needs: 1470 mL/day ( 30+ml/kg )  Justification: Maintenance    MALNUTRITION  % Intake: No decreased intake noted  % Weight Loss: Weight loss does not meet criteria   Subcutaneous Fat Loss: Orbital: Mild  Muscle Loss: Clavicles (pectoralis and deltoids): Mild  Fluid Accumulation/Edema: None noted  Malnutrition Diagnosis: Patient does not meet two of the established criteria necessary for diagnosing malnutrition but is at risk for malnutrition    EVALUATION OF THE PROGRESS TOWARD GOALS     NUTRITION DIAGNOSIS  Increased nutrient needs related to Parkinson's  as evidenced by slow loss of lean body mass  - ongoing    Goals  Maintain wt  Patient to consume % of nutritionally adequate meal trays TID, or the equivalent with supplements/snacks.- met     Monitoring/Evaluation      Progress toward goals will be monitored and evaluated per policy.

## 2025-01-29 NOTE — PROGRESS NOTES
"PRIMARY DIAGNOSIS: \"GENERIC\" NURSING  OUTPATIENT/OBSERVATION GOALS TO BE MET BEFORE DISCHARGE:  ADLs back to baseline: No    Activity and level of assistance: Up with maximum assistance. Consider SW and/or PT evaluation.     Pain status: Improved-controlled with oral pain medications.    Return to near baseline physical activity: No     Discharge Planner Nurse   Safe discharge environment identified: No  Barriers to discharge: No       Entered by: Avril Hoyt RN 01/29/2025 2:32 AM     Please review provider order for any additional goals.   Nurse to notify provider when observation goals have been met and patient is ready for discharge.  "

## 2025-01-29 NOTE — PLAN OF CARE
PRIMARY DIAGNOSIS: GENERALIZED WEAKNESS    OUTPATIENT/OBSERVATION GOALS TO BE MET BEFORE DISCHARGE  1. Orthostatic performed: No    2. Tolerating PO medications: Yes    3. Return to near baseline physical activity: No    4. Cleared for discharge by consultants (if involved): Yes    Discharge Planner Nurse   Safe discharge environment identified:  YES  Barriers to discharge:  TCU placement       Entered by: Yun Conner RN 01/29/2025 4:38 PM     Please review provider order for any additional goals.   Nurse to notify provider when observation goals have been met and patient is ready for discharge.Goal Outcome Evaluation:      Plan of Care Reviewed With: patient

## 2025-01-29 NOTE — PLAN OF CARE
Problem: Pain Acute  Goal: Optimal Pain Control and Function  Intervention: Develop Pain Management Plan  Recent Flowsheet Documentation  Taken 1/29/2025 7155 by Avril Hoyt RN  Pain Management Interventions: medication (see MAR)   Goal Outcome Evaluation:    Patient is alert and oriented x4, forgetful at times. Vitally stable on RA. Bilateral leg pain managed with PRN oxycodone and intermittent use of cold packs. Up to bedside commode with assist of 1-2. Able to make needs known. Call light within reach. Bed alarm on for safety.

## 2025-01-29 NOTE — PROGRESS NOTES
"PRIMARY DIAGNOSIS: \"GENERIC\" NURSING  OUTPATIENT/OBSERVATION GOALS TO BE MET BEFORE DISCHARGE:  ADLs back to baseline: No    Activity and level of assistance: Up with maximum assistance. Consider SW and/or PT evaluation.     Pain status: Improved-controlled with oral pain medications.    Return to near baseline physical activity: No     Discharge Planner Nurse   Safe discharge environment identified: No  Barriers to discharge: No       Entered by: Avril Hoyt RN 01/29/2025 5:58 AM     Please review provider order for any additional goals.   Nurse to notify provider when observation goals have been met and patient is ready for discharge.  "

## 2025-01-29 NOTE — PLAN OF CARE
PRIMARY DIAGNOSIS: GENERALIZED WEAKNESS    OUTPATIENT/OBSERVATION GOALS TO BE MET BEFORE DISCHARGE  1. Orthostatic performed: No    2. Tolerating PO medications: Yes    3. Return to near baseline physical activity: No    4. Cleared for discharge by consultants (if involved): Yes    Discharge Planner Nurse   Safe discharge environment identified: Yes  Barriers to discharge:  TCU placement       Entered by: Yun Conner RN 01/29/2025 3:12 PM     Please review provider order for any additional goals.   Nurse to notify provider when observation goals have been met and patient is ready for discharge.Goal Outcome Evaluation:      Plan of Care Reviewed With: patient

## 2025-01-29 NOTE — PROGRESS NOTES
[facility]    Hospitalist Progress Note    Assessment & Plan   64 year old female who was admitted on 1/25/2025 with back pain.     Impression:   Principal Problem:    Acute low back pain w bilateral sciatica  Active Problems:    Advanced Parkinson's disease (H)    Walker as ambulation aid    Underweight -- BMI 17.8      Plan:  improving, continue PT and OT    DVT Prophylaxis: Low Risk/Ambulatory with no VTE prophylaxis indicated  Code Status: Full Code    Disposition: Expected discharge to TCU, possibly tomorrow     Jose Manuel Javier MD  Pager [phone]  Cell Phone [phone]  Text Page (7am to 6pm)    Interval History   Back pain improving.  Has bilateral leg pain, right pain worse than left     Physical Exam   Temp: 97.9  F (36.6  C) Temp src: Oral BP: 110/57 Pulse: 92   Resp: 20 SpO2: 97 % O2 Device: None (Room air)    Vitals:    01/25/25 2204 01/27/25 1457   Weight: 49 kg (108 lb) 50 kg (110 lb 3.7 oz)     Vital Signs with Ranges  Temp:  [97.9  F (36.6  C)-98.2  F (36.8  C)] 97.9  F (36.6  C)  Pulse:  [64-92] 92  Resp:  [18-20] 20  BP: (110-145)/(57-74) 110/57  SpO2:  [97 %-98 %] 97 %  I/O last 3 completed shifts:  In: 240 [P.O.:240]  Out: -     # Pain Assessment:      1/29/2025     8:11 AM   Current Pain Score   Patient currently in pain? yes   - Marlene is experiencing pain due to back. Pain management was discussed and the plan was created in a collaborative fashion.  Marlene's response to the current recommendations: engaged  - Please see the plan for pain management as documented above      Constitutional: Awake, alert, cooperative, no apparent distress  Respiratory: Clear to auscultation bilaterally, no crackles or wheezing  Cardiovascular: Regular rate and rhythm, normal S1 and S2, and no murmur noted  GI: Normal bowel sounds, soft, non-distended, non-tender  Extrem: No calf tenderness, no ankle edema  Neuro: Ox3, no focal motor or sensory deficits.  Bilateral straight leg  raise limited by hamstring tightness at 45 degrees with both legs.     Medications   Current Facility-Administered Medications   Medication Dose Route Frequency Provider Last Rate Last Admin     Current Facility-Administered Medications   Medication Dose Route Frequency Provider Last Rate Last Admin    acetaminophen (TYLENOL) tablet 975 mg  975 mg Oral TID Gaurav Swan MD   975 mg at 01/29/25 1343    Or    acetaminophen (TYLENOL) Suppository 650 mg  650 mg Rectal TID Gaurav Swan MD        aspirin EC tablet 162 mg  162 mg Oral Daily Yonatan Love DO   162 mg at 01/29/25 0810    carbidopa-levodopa (RYTARY) 48. MG per CR capsule 1 capsule  1 capsule Oral TID Jhonatan Alvares MD   1 capsule at 01/29/25 1344    carbidopa-levodopa (SINEMET)  MG per tablet 1 tablet  1 tablet Oral TID Jhonatan Alvares MD   1 tablet at 01/29/25 1031    enoxaparin ANTICOAGULANT (LOVENOX) injection 30 mg  30 mg Subcutaneous Q24H Gaurav Swan MD   30 mg at 01/29/25 0810    Lidocaine (LIDOCARE) 4 % Patch 1 patch  1 patch Transdermal Q24H Gaurav Swan MD   1 patch at 01/29/25 0809    polyethylene glycol (MIRALAX) Packet 17 g  17 g Oral Daily Gaurav Swan MD   17 g at 01/29/25 0809    sodium chloride (PF) 0.9% PF flush 3 mL  3 mL Intracatheter Q8H Gaurav Swan MD   3 mL at 01/29/25 0811    thiamine (B-1) tablet 100 mg  100 mg Oral Daily Yonatan Love DO   100 mg at 01/29/25 0810    traZODone (DESYREL) tablet 100 mg  100 mg Oral At Bedtime Yonatan Love DO   100 mg at 01/28/25 2121       Data   Recent Labs   Lab 01/29/25  0510 01/26/25  0820 01/25/25  2232   WBC  --  7.1 9.7   HGB  --  14.5 14.2   MCV  --  97 95    306 326   NA  --  140 143   POTASSIUM  --  4.0 4.2   CHLORIDE  --  108* 109*   CO2  --  19* 25   BUN  --  22.1 27.5*   CR 0.46* 0.47* 0.50*   ANIONGAP  --  13 9   TITO  --  9.0 9.7   GLC  --  157* 106*   ALBUMIN  --   --  4.1   PROTTOTAL  --   --  7.0   BILITOTAL  --   --  0.4    ALKPHOS  --   --  109   ALT  --   --  27   AST  --   --  35       Imaging:   No results found for this or any previous visit (from the past 24 hours).

## 2025-01-30 ENCOUNTER — DOCUMENTATION ONLY (OUTPATIENT)
Dept: GERIATRICS | Facility: CLINIC | Age: 65
End: 2025-01-30
Payer: COMMERCIAL

## 2025-01-30 ENCOUNTER — TELEPHONE (OUTPATIENT)
Dept: FAMILY MEDICINE | Facility: CLINIC | Age: 65
End: 2025-01-30
Payer: COMMERCIAL

## 2025-01-30 ENCOUNTER — PATIENT OUTREACH (OUTPATIENT)
Dept: CARE COORDINATION | Facility: CLINIC | Age: 65
End: 2025-01-30
Payer: COMMERCIAL

## 2025-01-30 ENCOUNTER — LAB REQUISITION (OUTPATIENT)
Dept: LAB | Facility: CLINIC | Age: 65
End: 2025-01-30
Payer: COMMERCIAL

## 2025-01-30 VITALS
RESPIRATION RATE: 18 BRPM | TEMPERATURE: 98.5 F | BODY MASS INDEX: 17.72 KG/M2 | DIASTOLIC BLOOD PRESSURE: 58 MMHG | HEART RATE: 90 BPM | OXYGEN SATURATION: 97 % | HEIGHT: 66 IN | SYSTOLIC BLOOD PRESSURE: 119 MMHG | WEIGHT: 110.23 LBS

## 2025-01-30 DIAGNOSIS — E87.0 HYPEROSMOLALITY AND HYPERNATREMIA: ICD-10-CM

## 2025-01-30 PROBLEM — M25.551 PAIN IN JOINT INVOLVING RIGHT PELVIC REGION AND THIGH: Status: ACTIVE | Noted: 2020-07-28

## 2025-01-30 PROBLEM — F06.8 PSYCHOSIS DUE TO PARKINSON'S DISEASE (H): Status: ACTIVE | Noted: 2024-12-31

## 2025-01-30 PROBLEM — G20.A1 PSYCHOSIS DUE TO PARKINSON'S DISEASE (H): Status: ACTIVE | Noted: 2024-12-31

## 2025-01-30 LAB
ANION GAP SERPL CALCULATED.3IONS-SCNC: 7 MMOL/L (ref 7–15)
BUN SERPL-MCNC: 18 MG/DL (ref 8–23)
CALCIUM SERPL-MCNC: 9.7 MG/DL (ref 8.8–10.4)
CHLORIDE SERPL-SCNC: 107 MMOL/L (ref 98–107)
CREAT SERPL-MCNC: 0.48 MG/DL (ref 0.51–0.95)
EGFRCR SERPLBLD CKD-EPI 2021: >90 ML/MIN/1.73M2
GLUCOSE SERPL-MCNC: 107 MG/DL (ref 70–99)
HCO3 SERPL-SCNC: 32 MMOL/L (ref 22–29)
HGB BLD-MCNC: 14 G/DL (ref 11.7–15.7)
MAGNESIUM SERPL-MCNC: 2.2 MG/DL (ref 1.7–2.3)
PHOSPHATE SERPL-MCNC: 3.9 MG/DL (ref 2.5–4.5)
PLATELET # BLD AUTO: 243 10E3/UL (ref 150–450)
POTASSIUM SERPL-SCNC: 4.4 MMOL/L (ref 3.4–5.3)
SODIUM SERPL-SCNC: 146 MMOL/L (ref 135–145)

## 2025-01-30 PROCEDURE — G0378 HOSPITAL OBSERVATION PER HR: HCPCS

## 2025-01-30 PROCEDURE — 85049 AUTOMATED PLATELET COUNT: CPT | Performed by: INTERNAL MEDICINE

## 2025-01-30 PROCEDURE — 250N000013 HC RX MED GY IP 250 OP 250 PS 637: Performed by: PSYCHIATRY & NEUROLOGY

## 2025-01-30 PROCEDURE — 80048 BASIC METABOLIC PNL TOTAL CA: CPT | Performed by: INTERNAL MEDICINE

## 2025-01-30 PROCEDURE — 250N000013 HC RX MED GY IP 250 OP 250 PS 637: Performed by: HOSPITALIST

## 2025-01-30 PROCEDURE — 85018 HEMOGLOBIN: CPT | Performed by: INTERNAL MEDICINE

## 2025-01-30 PROCEDURE — 82565 ASSAY OF CREATININE: CPT | Performed by: INTERNAL MEDICINE

## 2025-01-30 PROCEDURE — 250N000013 HC RX MED GY IP 250 OP 250 PS 637: Performed by: INTERNAL MEDICINE

## 2025-01-30 PROCEDURE — 84100 ASSAY OF PHOSPHORUS: CPT | Performed by: INTERNAL MEDICINE

## 2025-01-30 PROCEDURE — 250N000011 HC RX IP 250 OP 636: Performed by: HOSPITALIST

## 2025-01-30 PROCEDURE — 99239 HOSP IP/OBS DSCHRG MGMT >30: CPT | Performed by: INTERNAL MEDICINE

## 2025-01-30 PROCEDURE — 36415 COLL VENOUS BLD VENIPUNCTURE: CPT | Performed by: INTERNAL MEDICINE

## 2025-01-30 PROCEDURE — 83735 ASSAY OF MAGNESIUM: CPT | Performed by: INTERNAL MEDICINE

## 2025-01-30 PROCEDURE — 96372 THER/PROPH/DIAG INJ SC/IM: CPT | Performed by: HOSPITALIST

## 2025-01-30 RX ORDER — LIDOCAINE 4 G/G
1 PATCH TOPICAL EVERY 24 HOURS
Qty: 14 PATCH | Refills: 1 | Status: SHIPPED | OUTPATIENT
Start: 2025-01-30

## 2025-01-30 RX ORDER — ASPIRIN 81 MG/1
162 TABLET ORAL DAILY
Qty: 60 TABLET | Refills: 1 | Status: SHIPPED | OUTPATIENT
Start: 2025-01-30 | End: 2025-03-31

## 2025-01-30 RX ORDER — AMOXICILLIN 250 MG
1 CAPSULE ORAL 2 TIMES DAILY PRN
Qty: 14 TABLET | Refills: 0 | Status: SHIPPED | OUTPATIENT
Start: 2025-01-30

## 2025-01-30 RX ORDER — ACETAMINOPHEN 325 MG/1
975 TABLET ORAL 3 TIMES DAILY
Qty: 90 TABLET | Refills: 0 | Status: SHIPPED | OUTPATIENT
Start: 2025-01-30 | End: 2025-02-09

## 2025-01-30 RX ORDER — POLYETHYLENE GLYCOL 3350 17 G/17G
17 POWDER, FOR SOLUTION ORAL DAILY
Qty: 21 PACKET | Refills: 0 | Status: SHIPPED | OUTPATIENT
Start: 2025-01-30

## 2025-01-30 RX ORDER — CARBIDOPA AND LEVODOPA 25; 100 MG/1; MG/1
1 TABLET ORAL 3 TIMES DAILY
Qty: 270 TABLET | Refills: 0 | Status: SHIPPED | OUTPATIENT
Start: 2025-01-30 | End: 2025-04-30

## 2025-01-30 RX ORDER — LANOLIN ALCOHOL/MO/W.PET/CERES
100 CREAM (GRAM) TOPICAL DAILY
Qty: 90 TABLET | Refills: 0 | Status: SHIPPED | OUTPATIENT
Start: 2025-01-30

## 2025-01-30 RX ORDER — TRAZODONE HYDROCHLORIDE 100 MG/1
100 TABLET ORAL AT BEDTIME
Qty: 30 TABLET | Refills: 1 | Status: SHIPPED | OUTPATIENT
Start: 2025-01-30

## 2025-01-30 RX ORDER — OXYCODONE HYDROCHLORIDE 5 MG/1
2.5 TABLET ORAL EVERY 6 HOURS PRN
Qty: 8 TABLET | Refills: 0 | Status: SHIPPED | OUTPATIENT
Start: 2025-01-30

## 2025-01-30 RX ORDER — LANOLIN ALCOHOL/MO/W.PET/CERES
1000 CREAM (GRAM) TOPICAL DAILY
Qty: 30 TABLET | Refills: 1 | Status: SHIPPED | OUTPATIENT
Start: 2025-01-30

## 2025-01-30 RX ADMIN — LEVODOPA AND CARBIDOPA 1 CAPSULE: 195; 48.75 CAPSULE, EXTENDED RELEASE ORAL at 09:11

## 2025-01-30 RX ADMIN — ENOXAPARIN SODIUM 30 MG: 30 INJECTION SUBCUTANEOUS at 09:11

## 2025-01-30 RX ADMIN — ACETAMINOPHEN 975 MG: 325 TABLET ORAL at 09:11

## 2025-01-30 RX ADMIN — THIAMINE HCL TAB 100 MG 100 MG: 100 TAB at 09:12

## 2025-01-30 RX ADMIN — LIDOCAINE 1 PATCH: 4 PATCH TOPICAL at 09:11

## 2025-01-30 RX ADMIN — ASPIRIN 162 MG: 81 TABLET, COATED ORAL at 09:11

## 2025-01-30 RX ADMIN — CARBIDOPA AND LEVODOPA 1 TABLET: 25; 100 TABLET ORAL at 05:01

## 2025-01-30 RX ADMIN — CARBIDOPA AND LEVODOPA 1 TABLET: 25; 100 TABLET ORAL at 09:11

## 2025-01-30 RX ADMIN — OXYCODONE HYDROCHLORIDE 2.5 MG: 5 TABLET ORAL at 04:09

## 2025-01-30 ASSESSMENT — ACTIVITIES OF DAILY LIVING (ADL)
ADLS_ACUITY_SCORE: 46
ADLS_ACUITY_SCORE: 46
ADLS_ACUITY_SCORE: 48
ADLS_ACUITY_SCORE: 47
ADLS_ACUITY_SCORE: 46
ADLS_ACUITY_SCORE: 47
ADLS_ACUITY_SCORE: 47
ADLS_ACUITY_SCORE: 46
ADLS_ACUITY_SCORE: 47

## 2025-01-30 NOTE — LETTER
[facility]  [address]  [place] [address]   January 30, 2025    Marlene Merida  [address]     [place] [address]      [facility]     To:   [facility]          Please give to facility    From:   [place]  [facility]  Care Coordinator   [facility]   P: [phone]  [email or web address]   Patient Name:  Marlene Merida YOB: 1960   Admit date: 1/30/25      *Information Needed:  Please contact me when the patient will discharge (or if they will move to long term care)- include the discharge date, disposition, and main diagnosis   If the patient is discharged with home care services, please provide the name of the agency    Also- Please inform me if a care conference is being held.   Phone, Fax or Email with information                              Thank you

## 2025-01-30 NOTE — TELEPHONE ENCOUNTER
Patient Quality Outreach    Patient is due for the following:   Physical Annual Wellness Visit    Action(s) Taken:   Schedule a Annual Wellness Visit    Type of outreach:    Phone, spoke to patient/parent. Patient    Questions for provider review:    None           Derek Lopes MA  Chart routed to self.

## 2025-01-30 NOTE — PROGRESS NOTES
"[facility]    Medicine Progress Note - Hospitalist Service    Date of Admission:  1/25/2025    Assessment & Plan       64 year old female who was admitted on 1/25/2025 with back pain. She was diagnosed with sciatica and treated with pain meds, pt,ot  She will go to TCU for reconditioning    1.Lower back pain  -Mri done here this admit  1.  Moderately advanced thoracolumbar levoscoliosis with multilevel disc degeneration and facet hypertrophy as described above. Negative for spinal canal stenosis. Multifocal mild to moderate neural foraminal stenoses.  2.  Modic type I endplate change at L2-L3.    2.Acute pain  -responding to tylenol, lidocaine patch and oxy prn  -ongoing pt/ot  -having stools fine    3.Parkinson  -on sinemet   -fall risk precautions    4.Hypernatremia  -mild elevation, 146, encourage water intake  -will check labs at TCU    5.Underweight  -bmi 17.8              Diet: Combination Diet Regular Diet Adult  Snacks/Supplements Adult: Ensure Enlive; With Meals    DVT Prophylaxis: lovenox  Gilliam Catheter: Not present  Lines: None     Cardiac Monitoring: None  Code Status: Full Code      Clinically Significant Risk Factors                              # Cachexia: Estimated body mass index is 17.79 kg/m  as calculated from the following:    Height as of this encounter: 1.676 m (5' 6\").    Weight as of this encounter: 50 kg (110 lb 3.7 oz)., PRESENT ON ADMISSION     # Financial/Environmental Concerns: none         Social Drivers of Health    Food Insecurity: High Risk (1/27/2025)    Food Insecurity     Within the past 12 months, did you worry that your food would run out before you got money to buy more?: Yes     Within the past 12 months, did the food you bought just not last and you didn t have money to get more?: Yes   Housing Stability: High Risk (1/27/2025)    Housing Stability     Do you have housing? : Yes     Are you worried about losing your housing?: Yes   Financial Resource " Strain: High Risk (1/27/2025)    Financial Resource Strain     Within the past 12 months, have you or your family members you live with been unable to get utilities (heat, electricity) when it was really needed?: Yes   Transportation Needs: High Risk (1/27/2025)    Transportation Needs     Within the past 12 months, has lack of transportation kept you from medical appointments, getting your medicines, non-medical meetings or appointments, work, or from getting things that you need?: Yes   Physical Activity: Inactive (1/13/2025)    Exercise Vital Sign     Days of Exercise per Week: 0 days     Minutes of Exercise per Session: 0 min   Interpersonal Safety: Patient Declined (12/27/2024)    Received from [facility]    Humiliation, Afraid, Rape, and Kick questionnaire     Fear of Current or Ex-Partner: Patient declined     Emotionally Abused: Patient declined     Physically Abused: Patient declined     Sexually Abused: Patient declined   Stress: Stress Concern Present (1/13/2025)    [employer] - Occupational Stress Questionnaire     Feeling of Stress : To some extent   Social Connections: Moderately Isolated (1/13/2025)    Social Connection and Isolation Panel [NHANES]     Frequency of Communication with Friends and Family: Three times a week     Frequency of Social Gatherings with Friends and Family: Once a week     Attends Muslim Services: 1 to 4 times per year     Active Member of Clubs or Organizations: No     Attends Club or Organization Meetings: Never     Marital Status:           Disposition Plan     Medically Ready for Discharge: Anticipated Today             Lila Herrera MD  Hospitalist Service  [facility]  Securely message with [employer] (more info)  Text page via [employer] Paging/Directory   ______________________________________________________________________    Interval History   She feels much better  She notes pain in both legs much  less  Able to sit cross legs in chair   Eating ok  Having stools      Physical Exam   Vital Signs: Temp: 97.7  F (36.5  C) Temp src: Oral BP: 136/64 Pulse: 80   Resp: 18 SpO2: 96 % O2 Device: None (Room air)    Weight: 110 lbs 3.68 oz    Constitutional: awake, fatigued, alert, cooperative, and no apparent distress  Eyes: sclera clear  Respiratory: no increased work of breathing, good air exchange, no retractions, and clear to auscultation  Cardiovascular: regular rate and rhythm and normal S1 and S2  GI: normal bowel sounds, soft, non-distended, and non-tender  Skin: no bruising or bleeding  Musculoskeletal: no lower extremity pitting edema present  Neurologic: Mental Status Exam:  Level of Alertness:   awake  Language:  normal  Motor Exam:  moves all extremities well and symmetrically  Was sitting in chair crossed legs and able to hold up against resistance   Neuropsychiatric: Affect: normal and pleasant      Data     Na 146  K 4.4  Creat 0.48  Mag 2.2  Phos 3.9  Hgb 14  Plt 243    Imaging results reviewed over the past 24 hrs:   No results found for this or any previous visit (from the past 24 hours).

## 2025-01-30 NOTE — DISCHARGE SUMMARY
[facility] MEDICINE  DISCHARGE SUMMARY     Primary Care Physician: Анна Jennings  Admission Date: 1/25/2025   Discharge Provider: Lila Herrera MD Discharge Date: 1/30/2025   Diet:   Active Diet and Nourishment Order   Procedures    Snacks/Supplements Adult: Ensure Enlive; With Meals    Combination Diet Regular Diet Adult    Diet       Code Status: Full Code   Activity: up with assist, fall risk        Condition at Discharge: Good     REASON FOR PRESENTATION(See Admission Note for Details)   Back pain    PRINCIPAL & ACTIVE DISCHARGE DIAGNOSES     Principal Problem:    Acute low back pain w bilateral sciatica  Active Problems:    Advanced Parkinson's disease (H)    Walker as ambulation aid    Underweight -- BMI 17.8      PENDING LABS     Unresulted Labs Ordered in the Past 30 Days of this Admission       No orders found from 12/26/2024 to 1/26/2025.              PROCEDURES ( this hospitalization only)          RECOMMENDATIONS TO OUTPATIENT PROVIDER FOR F/U VISIT     Follow-up Appointments       Follow Up and recommended labs and tests      Follow up with Nursing home physician.  Will need [facility] to follow up sodium tomorrow , 1/31/25 and call MD covering tcu  Follow up with primary care provider in 1-2 weeks                    DISPOSITION     Skilled Nursing Facility    SUMMARY OF HOSPITAL COURSE:        64 year old female who was admitted on 1/25/2025 with back pain. She was diagnosed with sciatica and treated with pain meds, pt,ot  She will go to TCU for reconditioning     1.Lower back pain  -Mri done here this admit  1.  Moderately advanced thoracolumbar levoscoliosis with multilevel disc degeneration and facet hypertrophy as described above. Negative for spinal canal stenosis. Multifocal mild to moderate neural foraminal stenoses.  2.  Modic type I endplate change at L2-L3.     2.Acute pain  -responding to tylenol, lidocaine patch and oxy prn  -ongoing pt/ot  -having  stools fine     3.Parkinson  -on sinemet   -fall risk precautions     4.Hypernatremia  -mild elevation, 146, encourage water intake  -will check labs at TCU     5.Underweight  -bmi 17.8       Discharge Medications with Med changes:     Current Discharge Medication List        START taking these medications    Details   acetaminophen (TYLENOL) 325 MG tablet Take 3 tablets (975 mg) by mouth 3 times daily for 10 days.  Qty: 90 tablet, Refills: 0    Associated Diagnoses: Acute bilateral low back pain with bilateral sciatica      carbidopa-levodopa (SINEMET)  MG tablet Take 1 tablet by mouth 3 times daily.  Qty: 270 tablet, Refills: 0    Associated Diagnoses: Parkinson's disease, unspecified whether dyskinesia present, unspecified whether manifestations fluctuate (H)      Lidocaine (LIDOCARE) 4 % Patch Place 1 patch over 12 hours onto the skin every 24 hours. To prevent lidocaine toxicity, patient should be patch free for 12 hrs daily.  Qty: 14 patch, Refills: 1    Associated Diagnoses: Acute bilateral low back pain with bilateral sciatica      oxyCODONE (ROXICODONE) 5 MG tablet Take 0.5 tablets (2.5 mg) by mouth every 6 hours as needed for moderate pain.  Qty: 8 tablet, Refills: 0    Associated Diagnoses: Acute bilateral low back pain with bilateral sciatica      polyethylene glycol (MIRALAX) 17 g packet Take 17 g by mouth daily.  Qty: 21 packet, Refills: 0    Associated Diagnoses: Constipation, unspecified constipation type      senna-docusate (SENOKOT-S/PERICOLACE) 8.6-50 MG tablet Take 1 tablet by mouth 2 times daily as needed for constipation.  Qty: 14 tablet, Refills: 0    Associated Diagnoses: Constipation, unspecified constipation type           CONTINUE these medications which have CHANGED    Details   aspirin 81 MG EC tablet Take 2 tablets (162 mg) by mouth daily.  Qty: 60 tablet, Refills: 1    Associated Diagnoses: Parkinson's disease, unspecified whether dyskinesia present, unspecified whether  manifestations fluctuate (H)      carbidopa-levodopa (RYTARY) 48. MG CPCR per CR capsule Take 1 capsule by mouth 3 times daily.  Qty: 90 capsule, Refills: 1    Associated Diagnoses: Parkinson's disease, unspecified whether dyskinesia present, unspecified whether manifestations fluctuate (H)      cyanocobalamin (VITAMIN B-12) 1000 MCG tablet Take 1 tablet (1,000 mcg) by mouth daily.  Qty: 30 tablet, Refills: 1    Associated Diagnoses: Parkinson's disease, unspecified whether dyskinesia present, unspecified whether manifestations fluctuate (H)      thiamine (B-1) 100 MG tablet Take 1 tablet (100 mg) by mouth daily.  Qty: 90 tablet, Refills: 0    Associated Diagnoses: Parkinson's disease, unspecified whether dyskinesia present, unspecified whether manifestations fluctuate (H)      traZODone (DESYREL) 100 MG tablet Take 1 tablet (100 mg) by mouth at bedtime.  Qty: 30 tablet, Refills: 1    Associated Diagnoses: Insomnia, unspecified type           CONTINUE these medications which have NOT CHANGED    Details   melatonin 3 MG tablet Take 6 mg by mouth at bedtime.      vitamin C (ASCORBIC ACID) 500 MG tablet Take 500 mg by mouth daily.      Vitamin D3 (VITAMIN D) 10 MCG (400 UNIT) tablet Take 400 Units by mouth daily.           STOP taking these medications       ibuprofen (ADVIL/MOTRIN) 200 MG tablet Comments:   Reason for Stopping:                     Rationale for medication changes:      Tylenol tid  Lidocaine patch  Oxy prn          Consults       NEUROLOGY IP CONSULT  PHYSICAL THERAPY ADULT IP CONSULT  OCCUPATIONAL THERAPY ADULT IP CONSULT  NUTRITION SERVICES ADULT IP CONSULT  CARE MANAGEMENT / SOCIAL WORK IP CONSULT  CARE MANAGEMENT / SOCIAL WORK IP CONSULT  CARE MANAGEMENT / SOCIAL WORK IP CONSULT  CARE MANAGEMENT / SOCIAL WORK IP CONSULT  CARE MANAGEMENT / SOCIAL WORK IP CONSULT  CARE MANAGEMENT / SOCIAL WORK IP CONSULT  PHYSICAL THERAPY ADULT IP CONSULT  OCCUPATIONAL THERAPY ADULT IP CONSULT    Immunizations  "given this encounter     Most Recent Immunizations   Administered Date(s) Administered    COVID-19 MONOVALENT 12+ (Pfizer) 07/11/2021    TD,PF 7+ (Tenivac) 01/01/1998           Anticoagulation Information      Recent INR results: No results for input(s): \"INR\" in the last 168 hours.  Warfarin doses (if applicable) or name of other anticoagulant: none      SIGNIFICANT IMAGING FINDINGS     Results for orders placed or performed during the hospital encounter of 01/25/25   XR Chest 1 View    Impression    IMPRESSION: No acute abnormality.   MR Lumbar Spine w/o Contrast    Impression    IMPRESSION:  1.  Moderately advanced thoracolumbar levoscoliosis with multilevel disc degeneration and facet hypertrophy as described above. Negative for spinal canal stenosis. Multifocal mild to moderate neural foraminal stenoses.  2.  Modic type I endplate change at L2-L3.       SIGNIFICANT LABORATORY FINDINGS     Most Recent 3 CBC's:  Recent Labs   Lab Test 01/30/25  0714 01/29/25  0510 01/26/25  0820 01/25/25  2232 12/10/24  0948   WBC  --   --  7.1 9.7 8.0   HGB 14.0  --  14.5 14.2 13.8   MCV  --   --  97 95 97    227 306 326 306     Most Recent 3 BMP's:  Recent Labs   Lab Test 01/30/25  0714 01/29/25  0510 01/26/25  0820 01/25/25  2232   *  --  140 143   POTASSIUM 4.4  --  4.0 4.2   CHLORIDE 107  --  108* 109*   CO2 32*  --  19* 25   BUN 18.0  --  22.1 27.5*   CR 0.48* 0.46* 0.47* 0.50*   ANIONGAP 7  --  13 9   TITO 9.7  --  9.0 9.7   *  --  157* 106*       MR Lumbar Spine w/o Contrast    Result Date: 1/26/2025  EXAM: MR LUMBAR SPINE W/O CONTRAST LOCATION: [facility] DATE: 1/26/2025 INDICATION: right sided sciatica COMPARISON: 11/12/2008 TECHNIQUE: Routine Lumbar Spine MRI without IV contrast. FINDINGS: Nomenclature is based on 5 lumbar type vertebral bodies. There is moderately advanced thoracolumbar levoscoliosis centered at L1-L2. There is grade 1 retrolisthesis of T12 on L1, L1 on L2, " L2 on L3 and grade 1 anterolisthesis of L4 on L5. Vertebral body heights are maintained. There is Modic type I endplate change at L2-L3 with Modic type II endplate change at L1-L2 and L4-L5. Normal distal spinal cord and cauda equina with conus medullaris at L1. No extraspinal abnormality. Unremarkable visualized bony  pelvis. T12-L1: Disc desiccation and disc height loss. Diffuse disc bulge. Bilateral facet hypertrophy. No spinal canal stenosis. Moderate bilateral neural foraminal stenosis. L1-L2: Disc desiccation and disc height loss. Diffuse disc bulge with right central disc protrusion. Bilateral facet hypertrophy. No spinal canal stenosis. Moderate right greater than left neural foraminal stenosis. L2-L3: Disc desiccation and disc height loss. Diffuse disc bulge. Bilateral facet hypertrophy. No spinal canal stenosis. Moderate right greater than left neural foraminal stenosis. L3-L4: Disc desiccation and disc height loss. Diffuse disc bulge. Bilateral facet hypertrophy. Right and mild left neural foraminal stenosis. L4-L5: Disc desiccation and disc height loss. Diffuse disc bulge. Advanced left greater than right facet hypertrophy. No spinal canal stenosis. Moderate to severe left and mild right neural foraminal stenosis. L5-S1: Disc desiccation and disc height loss. Diffuse disc bulge. Moderately advanced bilateral facet hypertrophy. No spinal canal or foraminal stenosis.     IMPRESSION: 1.  Moderately advanced thoracolumbar levoscoliosis with multilevel disc degeneration and facet hypertrophy as described above. Negative for spinal canal stenosis. Multifocal mild to moderate neural foraminal stenoses. 2.  Modic type I endplate change at L2-L3.    XR Chest 1 View    Result Date: 1/26/2025  EXAM: XR CHEST 1 VIEW LOCATION: [facility] DATE: 1/26/2025 INDICATION: Rule out aspiration COMPARISON: 9/29/2009. FINDINGS: The heart size is normal. Few calcified granulomas. The lungs are clear. No  pneumothorax.     IMPRESSION: No acute abnormality.    XR Pelvis and Hip Left 2 Views    Result Date: 1/12/2025  EXAM: XR PELVIS AND HIP LEFT 2 VIEWS LOCATION: [facility] DATE: 1/12/2025 INDICATION: pain with walking COMPARISON: None.     IMPRESSION: No acute displaced fracture or subluxation. Hip joint spaces are preserved. Linear sclerosis along the medial femoral neck appears similar to CT 07/03/2021 and could represent a prior healed stress fracture. Consider further evaluation with MRI if a stress fracture fits with the clinical presentation.       Discharge Orders        Primary Care - Care Coordination Referral      General info for SNF    Length of Stay Estimate: Short Term Care: Estimated # of Days <30  Condition at Discharge: Improving  Level of care:skilled   Rehabilitation Potential: Good  Admission H&P remains valid and up-to-date: Yes  Recent Chemotherapy: N/A  Use Nursing Home Standing Orders: Yes     Follow Up and recommended labs and tests    Follow up with Nursing home physician.  Will need [facility] to follow up sodium tomorrow , 1/31/25 and call MD covering tcu  Follow up with primary care provider in 1-2 weeks     Reason for your hospital stay    Back pain     Intake and output    Every shift     Daily weights    Call Provider for weight gain of more than 2 pounds per day or 5 pounds per week.     Activity - Up with nursing assistance     Full Code     Physical Therapy Adult Consult    Evaluate and treat as clinically indicated.    Reason:  parkinson, back pain     Occupational Therapy Adult Consult    Evaluate and treat as clinically indicated.    Reason:  parkinson, back pain     Fall precautions     Diet    Follow this diet upon discharge: Current Diet:Orders Placed This Encounter      Snacks/Supplements Adult: Ensure Enlive; With Meals      Combination Diet Regular Diet Adult       Examination   Physical Exam   Temp:  [97.7  F (36.5  C)-98.3  F (36.8  C)] 98.2  F (36.8   C)  Pulse:  [80-97] 97  Resp:  [18-20] 18  BP: (110-145)/(57-68) 129/64  SpO2:  [96 %-97 %] 96 %  Wt Readings from Last 1 Encounters:   01/27/25 50 kg (110 lb 3.7 oz)       See today's note    Please see EMR for more detailed significant labs, imaging, consultant notes etc.    I, Lila Herrera MD, personally saw the patient today and spent greater than 30 minutes discharging this patient.    Lila Herrera MD  [facility]    CC:Анна Jennings

## 2025-01-30 NOTE — Clinical Note
[place] pt [facility]. I will monitor for discharge and complete [facility] follow-up; have adjusted your outreach to reflect

## 2025-01-30 NOTE — PLAN OF CARE
Problem: Adult Inpatient Plan of Care  Goal: Plan of Care Review  Description: The Plan of Care Review/Shift note should be completed every shift.  The Outcome Evaluation is a brief statement about your assessment that the patient is improving, declining, or no change.  This information will be displayed automatically on your shift  note.  Outcome: Progressing     Problem: Fall Injury Risk  Goal: Absence of Fall and Fall-Related Injury  Outcome: Progressing  Intervention: Identify and Manage Contributors  Recent Flowsheet Documentation  Taken 1/29/2025 2013 by Brooklyn Caruso RN  Medication Review/Management: medications reviewed  Intervention: Promote Injury-Free Environment  Recent Flowsheet Documentation  Taken 1/29/2025 2013 by Brooklyn Caruso, RN  Safety Promotion/Fall Prevention:   safety round/check completed   activity supervised     Problem: Delirium  Goal: Optimal Coping  Outcome: Progressing     Problem: Pain Acute  Goal: Optimal Pain Control and Function  Outcome: Progressing  Intervention: Prevent or Manage Pain  Recent Flowsheet Documentation  Taken 1/29/2025 2013 by Brooklyn Caruso RN  Medication Review/Management: medications reviewed     Problem: Adult Inpatient Plan of Care  Goal: Absence of Hospital-Acquired Illness or Injury  Intervention: Identify and Manage Fall Risk  Recent Flowsheet Documentation  Taken 1/29/2025 2013 by Brooklyn Caruso RN  Safety Promotion/Fall Prevention:   safety round/check completed   activity supervised  Intervention: Prevent Skin Injury  Recent Flowsheet Documentation  Taken 1/29/2025 2013 by Brooklyn Caruso, RN  Body Position: position changed independently  Intervention: Prevent Infection  Recent Flowsheet Documentation  Taken 1/29/2025 2013 by Brooklyn Caruso, RN  Infection Prevention: hand hygiene promoted  Goal: Optimal Comfort and Wellbeing  Intervention: Provide Person-Centered Care  Recent Flowsheet Documentation  Taken 1/29/2025 2013 by Brooklyn Caruso  RN  Trust Relationship/Rapport: care explained     Problem: Delirium  Goal: Improved Behavioral Control  Intervention: Minimize Safety Risk  Recent Flowsheet Documentation  Taken 1/29/2025 2013 by Brooklyn Caruso, RN  Enhanced Safety Measures:   review medications for side effects with activity   pain management  Trust Relationship/Rapport: care explained  Goal: Improved Attention and Thought Clarity  Intervention: Maximize Cognitive Function  Recent Flowsheet Documentation  Taken 1/29/2025 2013 by Brooklyn Caruso, RN  Reorientation Measures: clock in view   Goal Outcome Evaluation:

## 2025-01-30 NOTE — PLAN OF CARE
Occupational Therapy Discharge Summary    Reason for therapy discharge:    Discharged to transitional care facility.    Progress towards therapy goal(s). See goals on Care Plan in [facility] electronic health record for goal details.  Goals partially met.  Barriers to achieving goals:   discharge from facility.    Therapy recommendation(s):    Continued therapy is recommended.  Rationale/Recommendations:  to maximize ADL independence.

## 2025-01-30 NOTE — PROGRESS NOTES
Physical Therapy Discharge Summary    Reason for therapy discharge:    Discharged to transitional care facility.    Progress towards therapy goal(s). See goals on Care Plan in [facility] electronic health record for goal details.  Goals not met.  Barriers to achieving goals:   discharge from facility.    Therapy recommendation(s):    Continued therapy is recommended.  Rationale/Recommendations:  TCU to progress independence with functional mobility.

## 2025-01-30 NOTE — PROGRESS NOTES
Care Management Discharge Note    Discharge Date: 01/30/2025       Discharge Disposition: Transitional Care    Discharge Services:  transitional care unit    Discharge DME:  per facility, walker    Discharge Transportation: family or friend will provide    Private pay costs discussed: transportation costs    Does the patient's insurance plan have a 3 day qualifying hospital stay waiver?  No    PAS Confirmation Code:  [id number])  Patient/family educated on Medicare website which has current facility and service quality ratings:      Education Provided on the Discharge Plan:  yes  Persons Notified of Discharge Plans: patient, daughter Bibiana  Patient/Family in Agreement with the Plan: yes    Handoff Referral Completed: No, handoff not indicated or clinically appropriate    Additional Information:  Chart reviewed.   Discharge orders complete. [facility] [place]   Daughter Bibiana updated on ride time, questions answered.  Discharge orders sent to facility and they are aware of ride time.     Sharon Covarrubias, LICSW

## 2025-01-30 NOTE — CARE PLAN
PRIMARY DIAGNOSIS: ACUTE PAIN  OUTPATIENT/OBSERVATION GOALS TO BE MET BEFORE DISCHARGE:  1. Pain Status: Improved-controlled with oral pain medications.    2. Return to near baseline physical activity: No    3. Cleared for discharge by consultants (if involved): Yes    Discharge Planner Nurse   Safe discharge environment identified: Yes  Barriers to discharge: No       Entered by: Brooklyn Caruso RN 01/29/2025 9:58 PM     Please review provider order for any additional goals.   Nurse to notify provider when observation goals have been met and patient is ready for discharge.      Pt. Alert and oriented. Scheduled tylenol given for pain. RA. Ambulates with X 1 with walker. Continue with poc.

## 2025-01-30 NOTE — PROGRESS NOTES
PRIMARY DIAGNOSIS: ACUTE PAIN  OUTPATIENT/OBSERVATION GOALS TO BE MET BEFORE DISCHARGE:  1. Pain Status: Improved-controlled with oral pain medications.    2. Return to near baseline physical activity: No    3. Cleared for discharge by consultants (if involved): Yes    Discharge Planner Nurse   Safe discharge environment identified: Yes  Barriers to discharge: No       Entered by: Avril Hoyt RN 01/30/2025 4:20 AM     Please review provider order for any additional goals.   Nurse to notify provider when observation goals have been met and patient is ready for discharge.    Patient is alert and oriented x4. Vitally stable on RA. Bilateral leg and back pain managed with PRN oxycodone x1. Intermittent use of cold packs. Ambulate with assist of 1/walker to bedside commode. Pt is sitting up in chair. Able to make needs known. Call light within reach.

## 2025-01-31 LAB
ANION GAP SERPL CALCULATED.3IONS-SCNC: 12 MMOL/L (ref 7–15)
BUN SERPL-MCNC: 23 MG/DL (ref 8–23)
CALCIUM SERPL-MCNC: 9.7 MG/DL (ref 8.8–10.4)
CHLORIDE SERPL-SCNC: 103 MMOL/L (ref 98–107)
CREAT SERPL-MCNC: 0.61 MG/DL (ref 0.51–0.95)
EGFRCR SERPLBLD CKD-EPI 2021: >90 ML/MIN/1.73M2
GLUCOSE SERPL-MCNC: 107 MG/DL (ref 70–99)
HCO3 SERPL-SCNC: 27 MMOL/L (ref 22–29)
POTASSIUM SERPL-SCNC: 4.2 MMOL/L (ref 3.4–5.3)
SODIUM SERPL-SCNC: 142 MMOL/L (ref 135–145)

## 2025-01-31 PROCEDURE — 36415 COLL VENOUS BLD VENIPUNCTURE: CPT | Mod: ORL | Performed by: FAMILY MEDICINE

## 2025-01-31 PROCEDURE — P9604 ONE-WAY ALLOW PRORATED TRIP: HCPCS | Mod: ORL | Performed by: FAMILY MEDICINE

## 2025-01-31 PROCEDURE — 80048 BASIC METABOLIC PNL TOTAL CA: CPT | Mod: ORL | Performed by: FAMILY MEDICINE

## 2025-02-03 ENCOUNTER — TRANSITIONAL CARE UNIT VISIT (OUTPATIENT)
Dept: GERIATRICS | Facility: CLINIC | Age: 65
End: 2025-02-03
Payer: COMMERCIAL

## 2025-02-03 VITALS
WEIGHT: 115 LBS | SYSTOLIC BLOOD PRESSURE: 157 MMHG | HEIGHT: 66 IN | HEART RATE: 85 BPM | BODY MASS INDEX: 18.48 KG/M2 | RESPIRATION RATE: 16 BRPM | TEMPERATURE: 98.2 F | DIASTOLIC BLOOD PRESSURE: 94 MMHG | OXYGEN SATURATION: 97 %

## 2025-02-03 DIAGNOSIS — M54.42 ACUTE BILATERAL LOW BACK PAIN WITH BILATERAL SCIATICA: Primary | ICD-10-CM

## 2025-02-03 DIAGNOSIS — R63.6 UNDERWEIGHT: ICD-10-CM

## 2025-02-03 DIAGNOSIS — Z74.09 IMPAIRED MOBILITY AND ACTIVITIES OF DAILY LIVING: ICD-10-CM

## 2025-02-03 DIAGNOSIS — Z78.9 IMPAIRED MOBILITY AND ACTIVITIES OF DAILY LIVING: ICD-10-CM

## 2025-02-03 DIAGNOSIS — H00.014 HORDEOLUM EXTERNUM OF LEFT UPPER EYELID: ICD-10-CM

## 2025-02-03 DIAGNOSIS — M54.41 ACUTE BILATERAL LOW BACK PAIN WITH BILATERAL SCIATICA: Primary | ICD-10-CM

## 2025-02-03 DIAGNOSIS — G20.A1 PARKINSON'S DISEASE, UNSPECIFIED WHETHER DYSKINESIA PRESENT, UNSPECIFIED WHETHER MANIFESTATIONS FLUCTUATE (H): ICD-10-CM

## 2025-02-03 PROCEDURE — 99310 SBSQ NF CARE HIGH MDM 45: CPT | Performed by: NURSE PRACTITIONER

## 2025-02-03 NOTE — PROGRESS NOTES
[facility] GERIATRICS    PRIMARY CARE PROVIDER AND CLINIC:  Анна Jennings MD, [address] [facility] / [facility] [address]  Chief Complaint   Patient presents with    Hospital F/U      [place] Medical Record Number:  [id number]  Place of Service where encounter took place:  [facility] (TCU/SNF) [4000]    Marlene Merida  is a 64 year old  (1960), admitted to the above facility from  [facility]. Hospital stay 1/25/25 through 1/30/25..   Patient's living condition: lives alone    HPI:    Brief Summary of Hospital Course: Admitted for lower back pain and need for therapy.  Noted to have hypernatremia  MEDICATION CHANGES: Start acetaminophen, lidocaine patch, oxycodone, MiraLAX, senna.  Stopped ibuprofen  RECOMMENDED FOLLOW UP: none  Updates on Status Since Skilled nursing Admission: None    Today: Patient reports increased pain.  Unable to sit for very long without needing to move.  Pain is mostly localized to the left lower back.  She is resting okay at night.  Therapy reports Higuerard 29/56 ambulates independently with front wheeled walker.  Upper body dressing with set up contact-guard assist for lower body dressing.  Discharge planning for 1 week.  I reviewed her medications with her and she reports that she was not on Sinemet or medications for Parkinson's so will need to follow-up on that with family.  She also endorses some frustration with her declining condition over the last few months.  Instructed her that she has oxycodone that she can ask for for pain, I will request from nursing that it be administered.  Nurse reports patient has a stye of her left eye.  Also requesting a as needed dose of Tylenol for the middle of the night.  She had labs done on Friday and those were reviewed which showed improved sodium.    External notes reviewed: Facility EHR including progress notes, vital signs. Hospital discharge summary reviewed. Hospital discharge orders  reviewed.  JOHANNE reviewed and signed    CODE STATUS/ADVANCE DIRECTIVES DISCUSSION: Full code.  Full code    ALLERGIES: No Known Allergies   PAST MEDICAL HISTORY:   Past Medical History:   Diagnosis Date    Parkinson's disease (H) 2019      PAST SURGICAL HISTORY:   has a past surgical history that includes Breast surgery and GYN surgery.  FAMILY HISTORY: family history includes Alzheimer Disease in her mother; Arthritis in her father; Diabetes in her brother and paternal grandmother; Hypertension in her father and mother.  SOCIAL HISTORY:   reports that she has never smoked. She has never been exposed to tobacco smoke. She has never used smokeless tobacco. She reports current alcohol use. She reports that she does not use drugs.    Post Discharge Medication Reconciliation Status: discharge medications reconciled, continue medications without change  Current Outpatient Medications   Medication Sig Dispense Refill    acetaminophen (TYLENOL) 325 MG tablet Take 3 tablets (975 mg) by mouth 3 times daily for 10 days. 90 tablet 0    aspirin 81 MG EC tablet Take 2 tablets (162 mg) by mouth daily. 60 tablet 1    carbidopa-levodopa (RYTARY) 48. MG CPCR per CR capsule Take 1 capsule by mouth 3 times daily. 90 capsule 1    carbidopa-levodopa (SINEMET)  MG tablet Take 1 tablet by mouth 3 times daily. 270 tablet 0    cyanocobalamin (VITAMIN B-12) 1000 MCG tablet Take 1 tablet (1,000 mcg) by mouth daily. 30 tablet 1    Lidocaine (LIDOCARE) 4 % Patch Place 1 patch over 12 hours onto the skin every 24 hours. To prevent lidocaine toxicity, patient should be patch free for 12 hrs daily. 14 patch 1    melatonin 3 MG tablet Take 6 mg by mouth at bedtime.      oxyCODONE (ROXICODONE) 5 MG tablet Take 0.5 tablets (2.5 mg) by mouth every 6 hours as needed for moderate pain. 8 tablet 0    polyethylene glycol (MIRALAX) 17 g packet Take 17 g by mouth daily. 21 packet 0    senna-docusate (SENOKOT-S/PERICOLACE) 8.6-50 MG tablet Take 1  "tablet by mouth 2 times daily as needed for constipation. 14 tablet 0    thiamine (B-1) 100 MG tablet Take 1 tablet (100 mg) by mouth daily. 90 tablet 0    traZODone (DESYREL) 100 MG tablet Take 1 tablet (100 mg) by mouth at bedtime. 30 tablet 1    vitamin C (ASCORBIC ACID) 500 MG tablet Take 500 mg by mouth daily.      Vitamin D3 (VITAMIN D) 10 MCG (400 UNIT) tablet Take 400 Units by mouth daily.       No current facility-administered medications for this visit.       ROS:  4 point ROS including Respiratory, CV, GI and , other than that noted in the HPI,  is negative    Vitals:  BP (!) 157/94   Pulse 85   Temp 98.2  F (36.8  C)   Resp 16   Ht 1.676 m (5' 6\")   Wt 52.2 kg (115 lb)   LMP  (LMP Unknown)   SpO2 97%   BMI 18.56 kg/m    Exam:  GENERAL APPEARANCE:  Alert, in no distress  RESP:  respiratory effort normal  CV:  edema none  M/S:  Gait and station ambulates independently with walker.    SKIN:  Inspection and palpation of skin and subcutaneous tissue at baseline  PSYCH:  insight and judgement, memory impaired anything, affect and mood normal    Lab/Diagnostic data: Pertinent hospital labs: none    ASSESSMENT/PLAN:  Acute low back pain w bilateral sciatica  Impaired mobility and activities of daily living  Admitted to the TCU for therapy and nursing care following a hospital stay for back pain.  Started on pain medications.  -Continue PT/Ocupational Therapy  -Nursing for monitoring  - following for discharge planning  *Continues on lidocaine patch, Tylenol.  And as needed oxycodone  -Add acetaminophen 975 mg p.o. every day as needed for pain    Parkinson's disease, unspecified whether dyskinesia present, unspecified whether manifestations fluctuate (H)  Sinemet adjusted while inpatient.  -Follow-up with neurology as directed by them    Underweight -- BMI 17.8  Avoid weight loss.  Nutrition supplements for malnutrition risk    Hypernatremia -labs done on Friday reviewed and hypernatremia " is improved.  Will recheck on Thursday to ensure stability.    Hordeolum externum of left upper eyelid  Complains of itch.  Stye noted in left upper lid.    -Warm washcloth to left eye 15 minutes 4 times daily until stye resolved  -Artificial tears 4 times daily 2 drops in left eye until stye resolved then 4 times daily as needed    Orders:  -Warm washcloth to left eye 15 minutes 4 times daily until resolved  -Artificial tears 2 drops in left eye 4 times daily until stye resolved and 4 times daily as needed  -Change acetaminophen 975 mg p.o. 3 times daily without a stop date and 975 mg p.o. every day as needed  -Check BMP on Thursday for hyper natremia    45 MINUTES SPENT BY ME on the date of service doing chart review, history, exam, documentation & further activities per the note.       Electronically signed by: Shanta Storm NP

## 2025-02-03 NOTE — LETTER
2/3/2025      Marlene Merida  [address]   [address]  [facility] [address]        [facility] GERIATRICS    PRIMARY CARE PROVIDER AND CLINIC:  Анна Jennings MD, [address] / [facility] [address]  Chief Complaint   Patient presents with     Hospital F/U      [place] Medical Record Number:  [id number]  Place of Service where encounter took place:  [facility] (TCU/SNF) [4000]    Marlene Merida  is a 64 year old  (1960), admitted to the above facility from  [facility]. Hospital stay 1/25/25 through 1/30/25..   Patient's living condition: lives alone    HPI:    Brief Summary of Hospital Course: Admitted for lower back pain and need for therapy.  Noted to have hypernatremia  MEDICATION CHANGES: Start acetaminophen, lidocaine patch, oxycodone, MiraLAX, senna.  Stopped ibuprofen  RECOMMENDED FOLLOW UP: none  Updates on Status Since Skilled nursing Admission: None    Today: Patient reports increased pain.  Unable to sit for very long without needing to move.  Pain is mostly localized to the left lower back.  She is resting okay at night.  Therapy reports Higuerard 29/56 ambulates independently with front wheeled walker.  Upper body dressing with set up contact-guard assist for lower body dressing.  Discharge planning for 1 week.  I reviewed her medications with her and she reports that she was not on Sinemet or medications for Parkinson's so will need to follow-up on that with family.  She also endorses some frustration with her declining condition over the last few months.  Instructed her that she has oxycodone that she can ask for for pain, I will request from nursing that it be administered.  Nurse reports patient has a stye of her left eye.  Also requesting a as needed dose of Tylenol for the middle of the night.  She had labs done on Friday and those were reviewed which showed improved sodium.    External notes reviewed: Facility EHR including progress  notes, vital signs. Hospital discharge summary reviewed. Hospital discharge orders reviewed.  POLST reviewed and signed    CODE STATUS/ADVANCE DIRECTIVES DISCUSSION: Full code.  Full code    ALLERGIES: No Known Allergies   PAST MEDICAL HISTORY:   Past Medical History:   Diagnosis Date     Parkinson's disease (H) 2019      PAST SURGICAL HISTORY:   has a past surgical history that includes Breast surgery and GYN surgery.  FAMILY HISTORY: family history includes Alzheimer Disease in her mother; Arthritis in her father; Diabetes in her brother and paternal grandmother; Hypertension in her father and mother.  SOCIAL HISTORY:   reports that she has never smoked. She has never been exposed to tobacco smoke. She has never used smokeless tobacco. She reports current alcohol use. She reports that she does not use drugs.    Post Discharge Medication Reconciliation Status: discharge medications reconciled, continue medications without change  Current Outpatient Medications   Medication Sig Dispense Refill     acetaminophen (TYLENOL) 325 MG tablet Take 3 tablets (975 mg) by mouth 3 times daily for 10 days. 90 tablet 0     aspirin 81 MG EC tablet Take 2 tablets (162 mg) by mouth daily. 60 tablet 1     carbidopa-levodopa (RYTARY) 48. MG CPCR per CR capsule Take 1 capsule by mouth 3 times daily. 90 capsule 1     carbidopa-levodopa (SINEMET)  MG tablet Take 1 tablet by mouth 3 times daily. 270 tablet 0     cyanocobalamin (VITAMIN B-12) 1000 MCG tablet Take 1 tablet (1,000 mcg) by mouth daily. 30 tablet 1     Lidocaine (LIDOCARE) 4 % Patch Place 1 patch over 12 hours onto the skin every 24 hours. To prevent lidocaine toxicity, patient should be patch free for 12 hrs daily. 14 patch 1     melatonin 3 MG tablet Take 6 mg by mouth at bedtime.       oxyCODONE (ROXICODONE) 5 MG tablet Take 0.5 tablets (2.5 mg) by mouth every 6 hours as needed for moderate pain. 8 tablet 0     polyethylene glycol (MIRALAX) 17 g packet Take 17 g  "by mouth daily. 21 packet 0     senna-docusate (SENOKOT-S/PERICOLACE) 8.6-50 MG tablet Take 1 tablet by mouth 2 times daily as needed for constipation. 14 tablet 0     thiamine (B-1) 100 MG tablet Take 1 tablet (100 mg) by mouth daily. 90 tablet 0     traZODone (DESYREL) 100 MG tablet Take 1 tablet (100 mg) by mouth at bedtime. 30 tablet 1     vitamin C (ASCORBIC ACID) 500 MG tablet Take 500 mg by mouth daily.       Vitamin D3 (VITAMIN D) 10 MCG (400 UNIT) tablet Take 400 Units by mouth daily.       No current facility-administered medications for this visit.       ROS:  4 point ROS including Respiratory, CV, GI and , other than that noted in the HPI,  is negative    Vitals:  BP (!) 157/94   Pulse 85   Temp 98.2  F (36.8  C)   Resp 16   Ht 1.676 m (5' 6\")   Wt 52.2 kg (115 lb)   LMP  (LMP Unknown)   SpO2 97%   BMI 18.56 kg/m    Exam:  GENERAL APPEARANCE:  Alert, in no distress  RESP:  respiratory effort normal  CV:  edema none  M/S:  Gait and station ambulates independently with walker.    SKIN:  Inspection and palpation of skin and subcutaneous tissue at baseline  PSYCH:  insight and judgement, memory impaired anything, affect and mood normal    Lab/Diagnostic data: Pertinent hospital labs: none    ASSESSMENT/PLAN:  Acute low back pain w bilateral sciatica  Impaired mobility and activities of daily living  Admitted to the TCU for therapy and nursing care following a hospital stay for back pain.  Started on pain medications.  -Continue PT/Ocupational Therapy  -Nursing for monitoring  - following for discharge planning  *Continues on lidocaine patch, Tylenol.  And as needed oxycodone  -Add acetaminophen 975 mg p.o. every day as needed for pain    Parkinson's disease, unspecified whether dyskinesia present, unspecified whether manifestations fluctuate (H)  Sinemet adjusted while inpatient.  -Follow-up with neurology as directed by them    Underweight -- BMI 17.8  Avoid weight loss.  Nutrition " supplements for malnutrition risk    Hypernatremia -labs done on Friday reviewed and hypernatremia is improved.  Will recheck on Thursday to ensure stability.    Hordeolum externum of left upper eyelid  Complains of itch.  Stye noted in left upper lid.    -Warm washcloth to left eye 15 minutes 4 times daily until stye resolved  -Artificial tears 4 times daily 2 drops in left eye until stye resolved then 4 times daily as needed    Orders:  -Warm washcloth to left eye 15 minutes 4 times daily until resolved  -Artificial tears 2 drops in left eye 4 times daily until stye resolved and 4 times daily as needed  -Change acetaminophen 975 mg p.o. 3 times daily without a stop date and 975 mg p.o. every day as needed  -Check BMP on Thursday for hyper natremia    45 MINUTES SPENT BY ME on the date of service doing chart review, history, exam, documentation & further activities per the note.       Electronically signed by: Shanta Storm NP      Sincerely,        Shanta Storm NP    Electronically signed

## 2025-02-04 ENCOUNTER — LAB REQUISITION (OUTPATIENT)
Dept: LAB | Facility: CLINIC | Age: 65
End: 2025-02-04
Payer: COMMERCIAL

## 2025-02-04 DIAGNOSIS — E87.0 HYPEROSMOLALITY AND HYPERNATREMIA: ICD-10-CM

## 2025-02-04 NOTE — TELEPHONE ENCOUNTER
Patient Quality Outreach    Patient is due for the following:   Annual Flu Vaccine     Action(s) Taken:   Schedule a nurse only visit for   Annual Flu Vaccine     Type of outreach:    Phone, left message for patient/parent to call back.    Questions for provider review:    None           Joseph Mccullough Jr., MA

## 2025-02-06 LAB
ANION GAP SERPL CALCULATED.3IONS-SCNC: 15 MMOL/L (ref 7–15)
BUN SERPL-MCNC: 23.1 MG/DL (ref 8–23)
CALCIUM SERPL-MCNC: 9 MG/DL (ref 8.8–10.4)
CHLORIDE SERPL-SCNC: 103 MMOL/L (ref 98–107)
CREAT SERPL-MCNC: 0.55 MG/DL (ref 0.51–0.95)
EGFRCR SERPLBLD CKD-EPI 2021: >90 ML/MIN/1.73M2
GLUCOSE SERPL-MCNC: 81 MG/DL (ref 70–99)
HCO3 SERPL-SCNC: 21 MMOL/L (ref 22–29)
POTASSIUM SERPL-SCNC: 4 MMOL/L (ref 3.4–5.3)
SODIUM SERPL-SCNC: 139 MMOL/L (ref 135–145)

## 2025-02-10 ENCOUNTER — MYC MEDICAL ADVICE (OUTPATIENT)
Dept: NEUROLOGY | Facility: CLINIC | Age: 65
End: 2025-02-10
Payer: COMMERCIAL

## 2025-02-10 DIAGNOSIS — G20.A1 PARKINSON'S DISEASE, UNSPECIFIED WHETHER DYSKINESIA PRESENT, UNSPECIFIED WHETHER MANIFESTATIONS FLUCTUATE (H): ICD-10-CM

## 2025-02-13 RX ORDER — CARBIDOPA AND LEVODOPA 25; 100 MG/1; MG/1
1 TABLET ORAL 3 TIMES DAILY
Qty: 270 TABLET | Refills: 1 | Status: SHIPPED | OUTPATIENT
Start: 2025-02-13

## 2025-02-13 NOTE — TELEPHONE ENCOUNTER
Patient recently hospitalized at [facility] 1/25-1/30. She was sent to TCU for 5 days following hospitalization. Patients daughter Bibiana is asking for medication refills for CD/LD  and Rytary 48..     Per RN review of chart there were written prescriptions at time of discharge, likely filled by TCU and patient does not have access to refills. RN T'd refills below, please review and sign.     Los BURDICK RN, BSN  [facility]

## 2025-02-18 ENCOUNTER — APPOINTMENT (OUTPATIENT)
Dept: CT IMAGING | Facility: HOSPITAL | Age: 65
End: 2025-02-18
Attending: EMERGENCY MEDICINE
Payer: COMMERCIAL

## 2025-02-18 ENCOUNTER — HOSPITAL ENCOUNTER (EMERGENCY)
Facility: HOSPITAL | Age: 65
Discharge: HOME | End: 2025-02-18
Attending: EMERGENCY MEDICINE | Admitting: EMERGENCY MEDICINE
Payer: COMMERCIAL

## 2025-02-18 ENCOUNTER — PATIENT OUTREACH (OUTPATIENT)
Dept: CARE COORDINATION | Facility: CLINIC | Age: 65
End: 2025-02-18

## 2025-02-18 ENCOUNTER — HOSPITAL ENCOUNTER (OUTPATIENT)
Facility: HOSPITAL | Age: 65
Setting detail: OBSERVATION
Discharge: HOME | End: 2025-02-18
Attending: EMERGENCY MEDICINE

## 2025-02-18 ENCOUNTER — APPOINTMENT (OUTPATIENT)
Dept: RADIOLOGY | Facility: HOSPITAL | Age: 65
End: 2025-02-18
Attending: EMERGENCY MEDICINE
Payer: COMMERCIAL

## 2025-02-18 ENCOUNTER — APPOINTMENT (OUTPATIENT)
Dept: MRI IMAGING | Facility: HOSPITAL | Age: 65
End: 2025-02-18
Payer: COMMERCIAL

## 2025-02-18 ENCOUNTER — HOSPITAL ENCOUNTER (OUTPATIENT)
Facility: HOSPITAL | Age: 65
Setting detail: OBSERVATION
Discharge: NURSING, REHAB OR LONG-TERM CARE | End: 2025-02-20
Attending: INTERNAL MEDICINE
Payer: COMMERCIAL

## 2025-02-18 VITALS
HEART RATE: 83 BPM | DIASTOLIC BLOOD PRESSURE: 76 MMHG | OXYGEN SATURATION: 96 % | SYSTOLIC BLOOD PRESSURE: 145 MMHG | RESPIRATION RATE: 16 BRPM

## 2025-02-18 VITALS
RESPIRATION RATE: 18 BRPM | OXYGEN SATURATION: 97 % | BODY MASS INDEX: 18.56 KG/M2 | HEART RATE: 82 BPM | DIASTOLIC BLOOD PRESSURE: 70 MMHG | TEMPERATURE: 97.9 F | SYSTOLIC BLOOD PRESSURE: 149 MMHG | WEIGHT: 115 LBS

## 2025-02-18 DIAGNOSIS — R26.2 UNABLE TO AMBULATE: ICD-10-CM

## 2025-02-18 DIAGNOSIS — R53.1 GENERAL WEAKNESS: ICD-10-CM

## 2025-02-18 DIAGNOSIS — R62.7 FAILURE TO THRIVE IN ADULT: ICD-10-CM

## 2025-02-18 DIAGNOSIS — M25.551 PAIN IN JOINT INVOLVING RIGHT PELVIC REGION AND THIGH: ICD-10-CM

## 2025-02-18 DIAGNOSIS — G20.B2 PARKINSON'S DISEASE WITH DYSKINESIA AND FLUCTUATING MANIFESTATIONS (H): Primary | ICD-10-CM

## 2025-02-18 PROBLEM — G20.A1 PARKINSON DISEASE (H): Status: ACTIVE | Noted: 2025-02-18

## 2025-02-18 LAB
ALBUMIN SERPL BCG-MCNC: 4 G/DL (ref 3.5–5.2)
ALBUMIN UR-MCNC: NEGATIVE MG/DL
ALP SERPL-CCNC: 151 U/L (ref 40–150)
ALT SERPL W P-5'-P-CCNC: 32 U/L (ref 0–50)
ANION GAP SERPL CALCULATED.3IONS-SCNC: 6 MMOL/L (ref 7–15)
APPEARANCE UR: CLEAR
AST SERPL W P-5'-P-CCNC: 53 U/L (ref 0–45)
BASOPHILS # BLD AUTO: 0 10E3/UL (ref 0–0.2)
BASOPHILS NFR BLD AUTO: 1 %
BILIRUB DIRECT SERPL-MCNC: <0.2 MG/DL (ref 0–0.3)
BILIRUB SERPL-MCNC: 0.3 MG/DL
BILIRUB UR QL STRIP: NEGATIVE
BUN SERPL-MCNC: 22.9 MG/DL (ref 8–23)
CALCIUM SERPL-MCNC: 9.5 MG/DL (ref 8.8–10.4)
CHLORIDE SERPL-SCNC: 105 MMOL/L (ref 98–107)
COLOR UR AUTO: NORMAL
CREAT SERPL-MCNC: 0.54 MG/DL (ref 0.51–0.95)
EGFRCR SERPLBLD CKD-EPI 2021: >90 ML/MIN/1.73M2
EOSINOPHIL # BLD AUTO: 0.1 10E3/UL (ref 0–0.7)
EOSINOPHIL NFR BLD AUTO: 1 %
ERYTHROCYTE [DISTWIDTH] IN BLOOD BY AUTOMATED COUNT: 14.1 % (ref 10–15)
GLUCOSE SERPL-MCNC: 95 MG/DL (ref 70–99)
GLUCOSE UR STRIP-MCNC: NEGATIVE MG/DL
HCO3 SERPL-SCNC: 30 MMOL/L (ref 22–29)
HCT VFR BLD AUTO: 40.8 % (ref 35–47)
HGB BLD-MCNC: 13.8 G/DL (ref 11.7–15.7)
HGB UR QL STRIP: NEGATIVE
IMM GRANULOCYTES # BLD: 0 10E3/UL
IMM GRANULOCYTES NFR BLD: 0 %
KETONES UR STRIP-MCNC: NEGATIVE MG/DL
LEUKOCYTE ESTERASE UR QL STRIP: NEGATIVE
LYMPHOCYTES # BLD AUTO: 1.4 10E3/UL (ref 0.8–5.3)
LYMPHOCYTES NFR BLD AUTO: 21 %
MAGNESIUM SERPL-MCNC: 2.1 MG/DL (ref 1.7–2.3)
MCH RBC QN AUTO: 32.5 PG (ref 26.5–33)
MCHC RBC AUTO-ENTMCNC: 33.8 G/DL (ref 31.5–36.5)
MCV RBC AUTO: 96 FL (ref 78–100)
MONOCYTES # BLD AUTO: 0.6 10E3/UL (ref 0–1.3)
MONOCYTES NFR BLD AUTO: 9 %
NEUTROPHILS # BLD AUTO: 4.3 10E3/UL (ref 1.6–8.3)
NEUTROPHILS NFR BLD AUTO: 68 %
NITRATE UR QL: NEGATIVE
NRBC # BLD AUTO: 0 10E3/UL
NRBC BLD AUTO-RTO: 0 /100
PH UR STRIP: 6 [PH] (ref 5–7)
PLATELET # BLD AUTO: 370 10E3/UL (ref 150–450)
POTASSIUM SERPL-SCNC: 4.4 MMOL/L (ref 3.4–5.3)
PROT SERPL-MCNC: 6.7 G/DL (ref 6.4–8.3)
RBC # BLD AUTO: 4.25 10E6/UL (ref 3.8–5.2)
RBC URINE: <1 /HPF
SODIUM SERPL-SCNC: 141 MMOL/L (ref 135–145)
SP GR UR STRIP: 1.01 (ref 1–1.03)
UROBILINOGEN UR STRIP-MCNC: <2 MG/DL
WBC # BLD AUTO: 6.3 10E3/UL (ref 4–11)
WBC URINE: <1 /HPF

## 2025-02-18 PROCEDURE — 80053 COMPREHEN METABOLIC PANEL: CPT | Performed by: EMERGENCY MEDICINE

## 2025-02-18 PROCEDURE — 99284 EMERGENCY DEPT VISIT MOD MDM: CPT | Mod: 25

## 2025-02-18 PROCEDURE — 99207 PR APP CREDIT; MD BILLING SHARED VISIT: CPT

## 2025-02-18 PROCEDURE — 250N000013 HC RX MED GY IP 250 OP 250 PS 637: Performed by: EMERGENCY MEDICINE

## 2025-02-18 PROCEDURE — 73562 X-RAY EXAM OF KNEE 3: CPT | Mod: 50

## 2025-02-18 PROCEDURE — 99285 EMERGENCY DEPT VISIT HI MDM: CPT | Mod: 25

## 2025-02-18 PROCEDURE — G0378 HOSPITAL OBSERVATION PER HR: HCPCS

## 2025-02-18 PROCEDURE — 36415 COLL VENOUS BLD VENIPUNCTURE: CPT | Performed by: EMERGENCY MEDICINE

## 2025-02-18 PROCEDURE — 81001 URINALYSIS AUTO W/SCOPE: CPT | Performed by: EMERGENCY MEDICINE

## 2025-02-18 PROCEDURE — 85004 AUTOMATED DIFF WBC COUNT: CPT | Performed by: EMERGENCY MEDICINE

## 2025-02-18 PROCEDURE — 99207 PR APP CREDIT; MD BILLING SHARED VISIT: CPT | Mod: FS

## 2025-02-18 PROCEDURE — 72141 MRI NECK SPINE W/O DYE: CPT

## 2025-02-18 PROCEDURE — 99222 1ST HOSP IP/OBS MODERATE 55: CPT | Mod: FS | Performed by: INTERNAL MEDICINE

## 2025-02-18 PROCEDURE — 83735 ASSAY OF MAGNESIUM: CPT | Performed by: EMERGENCY MEDICINE

## 2025-02-18 PROCEDURE — 85048 AUTOMATED LEUKOCYTE COUNT: CPT | Performed by: EMERGENCY MEDICINE

## 2025-02-18 PROCEDURE — 70450 CT HEAD/BRAIN W/O DYE: CPT

## 2025-02-18 PROCEDURE — 72125 CT NECK SPINE W/O DYE: CPT

## 2025-02-18 PROCEDURE — 84450 TRANSFERASE (AST) (SGOT): CPT | Performed by: EMERGENCY MEDICINE

## 2025-02-18 PROCEDURE — 99203 OFFICE O/P NEW LOW 30 MIN: CPT

## 2025-02-18 PROCEDURE — 82248 BILIRUBIN DIRECT: CPT | Performed by: EMERGENCY MEDICINE

## 2025-02-18 PROCEDURE — 250N000013 HC RX MED GY IP 250 OP 250 PS 637

## 2025-02-18 RX ORDER — POLYETHYLENE GLYCOL 3350 17 G/17G
17 POWDER, FOR SOLUTION ORAL 2 TIMES DAILY PRN
Status: DISCONTINUED | OUTPATIENT
Start: 2025-02-18 | End: 2025-02-18 | Stop reason: HOSPADM

## 2025-02-18 RX ORDER — AMOXICILLIN 250 MG
1 CAPSULE ORAL 2 TIMES DAILY PRN
Status: DISCONTINUED | OUTPATIENT
Start: 2025-02-18 | End: 2025-02-18 | Stop reason: HOSPADM

## 2025-02-18 RX ORDER — ASPIRIN 81 MG/1
81 TABLET ORAL 2 TIMES DAILY
COMMUNITY

## 2025-02-18 RX ORDER — AMOXICILLIN 250 MG
1 CAPSULE ORAL 2 TIMES DAILY PRN
Status: DISCONTINUED | OUTPATIENT
Start: 2025-02-18 | End: 2025-02-20 | Stop reason: HOSPADM

## 2025-02-18 RX ORDER — NALOXONE HYDROCHLORIDE 0.4 MG/ML
0.2 INJECTION, SOLUTION INTRAMUSCULAR; INTRAVENOUS; SUBCUTANEOUS
Status: DISCONTINUED | OUTPATIENT
Start: 2025-02-18 | End: 2025-02-20 | Stop reason: HOSPADM

## 2025-02-18 RX ORDER — ONDANSETRON 2 MG/ML
4 INJECTION INTRAMUSCULAR; INTRAVENOUS EVERY 6 HOURS PRN
Status: DISCONTINUED | OUTPATIENT
Start: 2025-02-18 | End: 2025-02-20 | Stop reason: HOSPADM

## 2025-02-18 RX ORDER — CARBIDOPA AND LEVODOPA 25; 100 MG/1; MG/1
1 TABLET ORAL 3 TIMES DAILY
Status: DISCONTINUED | OUTPATIENT
Start: 2025-02-18 | End: 2025-02-18 | Stop reason: HOSPADM

## 2025-02-18 RX ORDER — NALOXONE HYDROCHLORIDE 0.4 MG/ML
0.4 INJECTION, SOLUTION INTRAMUSCULAR; INTRAVENOUS; SUBCUTANEOUS
Status: DISCONTINUED | OUTPATIENT
Start: 2025-02-18 | End: 2025-02-20 | Stop reason: HOSPADM

## 2025-02-18 RX ORDER — CARBIDOPA AND LEVODOPA 25; 100 MG/1; MG/1
1 TABLET ORAL ONCE
Status: COMPLETED | OUTPATIENT
Start: 2025-02-18 | End: 2025-02-18

## 2025-02-18 RX ORDER — HYDRALAZINE HYDROCHLORIDE 20 MG/ML
10 INJECTION INTRAMUSCULAR; INTRAVENOUS EVERY 4 HOURS PRN
Status: DISCONTINUED | OUTPATIENT
Start: 2025-02-18 | End: 2025-02-20 | Stop reason: HOSPADM

## 2025-02-18 RX ORDER — LIDOCAINE 4 G/G
1 PATCH TOPICAL EVERY 24 HOURS
Status: DISCONTINUED | OUTPATIENT
Start: 2025-02-18 | End: 2025-02-20 | Stop reason: HOSPADM

## 2025-02-18 RX ORDER — PROCHLORPERAZINE MALEATE 5 MG/1
5 TABLET ORAL EVERY 6 HOURS PRN
Status: DISCONTINUED | OUTPATIENT
Start: 2025-02-18 | End: 2025-02-20 | Stop reason: HOSPADM

## 2025-02-18 RX ORDER — ONDANSETRON 4 MG/1
4 TABLET, ORALLY DISINTEGRATING ORAL EVERY 6 HOURS PRN
Status: DISCONTINUED | OUTPATIENT
Start: 2025-02-18 | End: 2025-02-18 | Stop reason: HOSPADM

## 2025-02-18 RX ORDER — TRAZODONE HYDROCHLORIDE 50 MG/1
100 TABLET ORAL AT BEDTIME
Status: DISCONTINUED | OUTPATIENT
Start: 2025-02-18 | End: 2025-02-20 | Stop reason: HOSPADM

## 2025-02-18 RX ORDER — ONDANSETRON 2 MG/ML
4 INJECTION INTRAMUSCULAR; INTRAVENOUS EVERY 6 HOURS PRN
Status: DISCONTINUED | OUTPATIENT
Start: 2025-02-18 | End: 2025-02-18 | Stop reason: HOSPADM

## 2025-02-18 RX ORDER — AMOXICILLIN 250 MG
2 CAPSULE ORAL 2 TIMES DAILY PRN
Status: DISCONTINUED | OUTPATIENT
Start: 2025-02-18 | End: 2025-02-20 | Stop reason: HOSPADM

## 2025-02-18 RX ORDER — ACETAMINOPHEN 325 MG/1
650 TABLET ORAL EVERY 4 HOURS PRN
Status: DISCONTINUED | OUTPATIENT
Start: 2025-02-18 | End: 2025-02-20 | Stop reason: HOSPADM

## 2025-02-18 RX ORDER — ACETAMINOPHEN 325 MG/1
650 TABLET ORAL EVERY 4 HOURS PRN
Status: DISCONTINUED | OUTPATIENT
Start: 2025-02-18 | End: 2025-02-18 | Stop reason: HOSPADM

## 2025-02-18 RX ORDER — CARBIDOPA AND LEVODOPA 25; 100 MG/1; MG/1
1 TABLET ORAL 3 TIMES DAILY
Status: DISCONTINUED | OUTPATIENT
Start: 2025-02-18 | End: 2025-02-20 | Stop reason: HOSPADM

## 2025-02-18 RX ORDER — PROCHLORPERAZINE MALEATE 5 MG/1
5 TABLET ORAL EVERY 6 HOURS PRN
Status: DISCONTINUED | OUTPATIENT
Start: 2025-02-18 | End: 2025-02-18 | Stop reason: HOSPADM

## 2025-02-18 RX ORDER — POLYETHYLENE GLYCOL 3350 17 G/17G
17 POWDER, FOR SOLUTION ORAL 2 TIMES DAILY PRN
Status: DISCONTINUED | OUTPATIENT
Start: 2025-02-18 | End: 2025-02-20 | Stop reason: HOSPADM

## 2025-02-18 RX ORDER — HYDRALAZINE HYDROCHLORIDE 10 MG/1
10 TABLET, FILM COATED ORAL EVERY 4 HOURS PRN
Status: DISCONTINUED | OUTPATIENT
Start: 2025-02-18 | End: 2025-02-20 | Stop reason: HOSPADM

## 2025-02-18 RX ORDER — AMOXICILLIN 250 MG
2 CAPSULE ORAL 2 TIMES DAILY PRN
Status: DISCONTINUED | OUTPATIENT
Start: 2025-02-18 | End: 2025-02-18 | Stop reason: HOSPADM

## 2025-02-18 RX ORDER — OXYCODONE HYDROCHLORIDE 5 MG/1
5 TABLET ORAL ONCE
Status: COMPLETED | OUTPATIENT
Start: 2025-02-18 | End: 2025-02-18

## 2025-02-18 RX ORDER — ACETAMINOPHEN 650 MG/1
650 SUPPOSITORY RECTAL EVERY 4 HOURS PRN
Status: DISCONTINUED | OUTPATIENT
Start: 2025-02-18 | End: 2025-02-20 | Stop reason: HOSPADM

## 2025-02-18 RX ORDER — ASPIRIN 81 MG/1
81 TABLET ORAL 2 TIMES DAILY
Status: DISCONTINUED | OUTPATIENT
Start: 2025-02-18 | End: 2025-02-18 | Stop reason: HOSPADM

## 2025-02-18 RX ORDER — LIDOCAINE 4 G/G
1 PATCH TOPICAL EVERY 24 HOURS
Status: DISCONTINUED | OUTPATIENT
Start: 2025-02-18 | End: 2025-02-18 | Stop reason: HOSPADM

## 2025-02-18 RX ORDER — TRAZODONE HYDROCHLORIDE 50 MG/1
100 TABLET ORAL AT BEDTIME
Status: DISCONTINUED | OUTPATIENT
Start: 2025-02-18 | End: 2025-02-18 | Stop reason: HOSPADM

## 2025-02-18 RX ORDER — ONDANSETRON 4 MG/1
4 TABLET, ORALLY DISINTEGRATING ORAL EVERY 6 HOURS PRN
Status: DISCONTINUED | OUTPATIENT
Start: 2025-02-18 | End: 2025-02-20 | Stop reason: HOSPADM

## 2025-02-18 RX ORDER — ACETAMINOPHEN 650 MG/1
650 SUPPOSITORY RECTAL EVERY 4 HOURS PRN
Status: DISCONTINUED | OUTPATIENT
Start: 2025-02-18 | End: 2025-02-18 | Stop reason: HOSPADM

## 2025-02-18 RX ORDER — ASPIRIN 81 MG/1
81 TABLET ORAL 2 TIMES DAILY
Status: DISCONTINUED | OUTPATIENT
Start: 2025-02-18 | End: 2025-02-20 | Stop reason: HOSPADM

## 2025-02-18 RX ORDER — OXYCODONE HYDROCHLORIDE 5 MG/1
5 TABLET ORAL EVERY 4 HOURS PRN
Status: DISCONTINUED | OUTPATIENT
Start: 2025-02-18 | End: 2025-02-18 | Stop reason: HOSPADM

## 2025-02-18 RX ORDER — OXYCODONE HYDROCHLORIDE 5 MG/1
5 TABLET ORAL EVERY 4 HOURS PRN
Status: DISCONTINUED | OUTPATIENT
Start: 2025-02-18 | End: 2025-02-20 | Stop reason: HOSPADM

## 2025-02-18 RX ORDER — ACETAMINOPHEN 325 MG/1
975 TABLET ORAL ONCE
Status: COMPLETED | OUTPATIENT
Start: 2025-02-18 | End: 2025-02-18

## 2025-02-18 RX ADMIN — LEVODOPA AND CARBIDOPA 1 CAPSULE: 195; 48.75 CAPSULE, EXTENDED RELEASE ORAL at 17:12

## 2025-02-18 RX ADMIN — CARBIDOPA AND LEVODOPA 1 TABLET: 25; 100 TABLET ORAL at 18:09

## 2025-02-18 RX ADMIN — OXYCODONE HYDROCHLORIDE 5 MG: 5 TABLET ORAL at 09:36

## 2025-02-18 RX ADMIN — CARBIDOPA AND LEVODOPA 1 TABLET: 25; 100 TABLET ORAL at 07:46

## 2025-02-18 RX ADMIN — ACETAMINOPHEN 975 MG: 325 TABLET ORAL at 07:45

## 2025-02-18 RX ADMIN — OXYCODONE HYDROCHLORIDE 5 MG: 5 TABLET ORAL at 23:57

## 2025-02-18 RX ADMIN — TRAZODONE HYDROCHLORIDE 100 MG: 50 TABLET ORAL at 21:17

## 2025-02-18 RX ADMIN — ASPIRIN 81 MG: 81 TABLET, COATED ORAL at 21:17

## 2025-02-18 RX ADMIN — LIDOCAINE 1 PATCH: 4 PATCH TOPICAL at 18:09

## 2025-02-18 ASSESSMENT — ACTIVITIES OF DAILY LIVING (ADL)
ADLS_ACUITY_SCORE: 58
ADLS_ACUITY_SCORE: 59
ADLS_ACUITY_SCORE: 58
ADLS_ACUITY_SCORE: 59
ADLS_ACUITY_SCORE: 58
ADLS_ACUITY_SCORE: 59
DEPENDENT_IADLS:: SHOPPING;TRANSPORTATION
ADLS_ACUITY_SCORE: 58
ADLS_ACUITY_SCORE: 58
ADLS_ACUITY_SCORE: 59
ADLS_ACUITY_SCORE: 59
ADLS_ACUITY_SCORE: 58
ADLS_ACUITY_SCORE: 58
ADLS_ACUITY_SCORE: 59
ADLS_ACUITY_SCORE: 58
ADLS_ACUITY_SCORE: 59

## 2025-02-18 ASSESSMENT — COLUMBIA-SUICIDE SEVERITY RATING SCALE - C-SSRS
1. IN THE PAST MONTH, HAVE YOU WISHED YOU WERE DEAD OR WISHED YOU COULD GO TO SLEEP AND NOT WAKE UP?: NO
6. HAVE YOU EVER DONE ANYTHING, STARTED TO DO ANYTHING, OR PREPARED TO DO ANYTHING TO END YOUR LIFE?: NO
6. HAVE YOU EVER DONE ANYTHING, STARTED TO DO ANYTHING, OR PREPARED TO DO ANYTHING TO END YOUR LIFE?: NO
2. HAVE YOU ACTUALLY HAD ANY THOUGHTS OF KILLING YOURSELF IN THE PAST MONTH?: NO
1. IN THE PAST MONTH, HAVE YOU WISHED YOU WERE DEAD OR WISHED YOU COULD GO TO SLEEP AND NOT WAKE UP?: NO
2. HAVE YOU ACTUALLY HAD ANY THOUGHTS OF KILLING YOURSELF IN THE PAST MONTH?: NO

## 2025-02-18 NOTE — PHARMACY-ADMISSION MEDICATION HISTORY
Pharmacist Admission Medication History    Admission medication history is complete. The information provided in this note is only as accurate as the sources available at the time of the update.    Information Source(s): Patient via in-person    Pertinent Information: Patient took her morning medications today at 08:00.    Both Rytary Capsule and Sinemet  are both taken TID at 0000, 0800 and 1600    Changes made to PTA medication list:  Added: None  Deleted: Miralax, Senna-docusate   Changed: Aspirin 162 mg daily to 81 mg BID     Allergies reviewed with patient and updates made in EHR: yes    Medication History Completed By: WARREN BENOIT RPH 2/18/2025 11:11 AM    PTA Med List   Medication Sig Note Last Dose/Taking    aspirin 81 MG EC tablet Take 81 mg by mouth 2 times daily.  2/18/2025 Morning    carbidopa-levodopa (RYTARY) 48. MG CPCR per CR capsule Take 1 capsule by mouth 3 times daily. 2/18/2025: 0000, 0800, 1600 2/18/2025 at  8:00 AM    carbidopa-levodopa (SINEMET)  MG tablet Take 1 tablet by mouth 3 times daily. 2/18/2025: 0000, 0800, 1600 2/18/2025 at  8:00 AM    cyanocobalamin (VITAMIN B-12) 1000 MCG tablet Take 1 tablet (1,000 mcg) by mouth daily.  2/18/2025 Morning    Lidocaine (LIDOCARE) 4 % Patch Place 1 patch over 12 hours onto the skin every 24 hours. To prevent lidocaine toxicity, patient should be patch free for 12 hrs daily.  2/17/2025 Noon    melatonin 3 MG tablet Take 6 mg by mouth at bedtime.  2/17/2025 Evening    oxyCODONE (ROXICODONE) 5 MG tablet Take 0.5 tablets (2.5 mg) by mouth every 6 hours as needed for moderate pain.  More than a month    thiamine (B-1) 100 MG tablet Take 1 tablet (100 mg) by mouth daily.  2/18/2025 Morning    traZODone (DESYREL) 100 MG tablet Take 1 tablet (100 mg) by mouth at bedtime.  Past Week Evening    vitamin C (ASCORBIC ACID) 500 MG tablet Take 500 mg by mouth daily.  2/18/2025 Morning    Vitamin D3 (VITAMIN D) 10 MCG (400 UNIT) tablet Take 400  Units by mouth daily.  2/18/2025 Morning

## 2025-02-18 NOTE — ED NOTES
Message sent to hospitalist for new/updated orders. MARCUS Rodriguez gave verbal handoff to MARCUS Ureña. Per chart review: NSGY Recommendations:  - Observation under hospitalist services  - Cervical MRI, further recs pending     Please page on call NSGY SHAHRAM via LOC[facility]ALL system for questions, concerns.

## 2025-02-18 NOTE — PHARMACY-ADMISSION MEDICATION HISTORY
Admission medication history completed at [facility]. Please see Pharmacist Admission Medication History note from 02/18/202025.

## 2025-02-18 NOTE — CONSULTS
[facility]    Neurosurgery Consultation     Date of Admission:  2/18/2025  Date of Consult (When I saw the patient): 02/18/25    Assessment & Plan   Marlene Merida is a 64 year old female past medical history parkinson's presents to [facility] ED today for evaluation of a fall and admitted 2/18/25. NSGY consulted for cervical stenosis. Per chart review, patient was hospitalized 2/13/25 after a fall, patient noted she didn't think she could care for herself and was discharged home.     Patient currently lives alone. Reports she attempted to get out of bed this morning and her legs gave out resulting in a fall.  Patient reports she hit her head on the carpet, no LOC. Prior to this morning, patient reports she was able to stand and ambulate. Reports chronic intermittent neck pain, bilateral upper extremity radicular pain/paresthesias, lower extremity pain/paresthesias. Currently patient does endorse neck pain worse when turning head towards right shoulder.  Denies acute bowel or bladder dysfunction, saddle anesthesia.    Patient follows with Dr. Bridger Niño, neurology, for advanced Parkinson's, currently on Sinemet. At baseline patient has tremors and intermittent shuffling gait secondary to her parkinson's disease.    Patient reports she does not routinely take aspirin (ASA 81mg listed on home medications). Takes aspirin 325mg as needed for pain.    On exam, patient is tremulous and has generalized upper and lower extremity weakness with most profound weakness in bilateral hip flexion, knee flexion and knee extension maneuvers. Atkins's positive bilaterally.     Cervical CT reviewed and discussed with patient today. Imaging demonstrates severe canal stenosis at C4-C5 and C5-C6, moderate canal narrowing at C3-C4 and C6-C7.     Discussed recommendation for cervical MRI for further evaluation of cervical stenosis. Patient verbalized agreement and understanding.     Imaging:  Imaging  Interpretation provided by radiologist.   EXAM: CT CERVICAL SPINE W/O CONTRAST  LOCATION: [facility]  DATE: 2/18/2025     INDICATION: Trauma, injury.  COMPARISON: None.  TECHNIQUE: Routine CT Cervical Spine without IV contrast. Multiplanar reformats. Dose reduction techniques were used.     FINDINGS:  VERTEBRA: The patient's head is tilted to the left. Mild straightening of the normal cervical lordosis. 3.8 mm spondylolisthesis of C3 on C4. 2.3 mm retrolisthesis from C4-C5 to C6-C7. The craniocervical junction is within normal limits. Vertebral body   heights are maintained. No prevertebral edema. No fractures.     CANAL/FORAMINA: Moderate intervertebral disc height loss from C3-C4 to C6-C7. Mild spinal canal narrowing at C2-C3. Moderate spinal canal narrowing at C3-C4. Severe spinal canal narrowing at C4-C5 and C5-C6. Moderate spinal canal narrowing at C6-C7.     Severe left and moderate right neuroforaminal narrowing at C3-C4. Moderate bilateral neuroforaminal narrowing at C4-C5. Severe left and mild to moderate right neuroforaminal narrowing at C5-C6 moderate bilateral neuroforaminal narrowing at C6-7. Mild   left neuroforaminal narrowing at C7-T1.     PARASPINAL: The lung apices are clear.                                                          IMPRESSION:  1.  No fracture or posttraumatic subluxation.  2.  3.8 mm degenerative spondylolisthesis of C3 on C4.   3.  Severe spinal canal narrowing at C4-C5 and C5-C6. Moderate spinal canal narrowing at C3-C4 and C6-C7.  4.  Severe left and moderate right neuroforaminal narrowing at C3-C4. Moderate bilateral neuroforaminal narrowing at C4-C5. Severe left and mild to moderate right neuroforaminal narrowing at C5-C6.    NSGY Recommendations:  - Observation under hospitalist services  - Cervical MRI, further recs pending    Please page on call NSGY SHAHRAM via [employer] system for questions, concerns.     I have discussed the following assessment and  plan with Dr. Nick who is in agreement with initial plan and will follow up with further consultation recommendations.    Kaylynn Vega PA-C  [facility] Neurosurgery  [address] [address]    Text page via [facility] Paging/Directory    Code Status    Full Code    Reason for Consult   Reason for consult: Cervical stenosis    Primary Care Physician   нАна Jennings    Chief Complaint   Fall    History is obtained from the patient and chart review    History of Present Illness   Marlene Merida is a 64 year old female past medical history parkinson's presents to [facility] ED today for evaluation of a fall. Per chart review, patient was hospitalized 2/13/25 after a fall, patient noted she didn't think she could care for herself and was discharged home.     Patient currently lives alone. Reports she attempted to get out of bed this morning and her legs gave out resulting in a fall.  Patient reports she hit her head on the carpet, no LOC. Prior to this morning, patient reports she was able to stand and ambulate. Reports chronic intermittent neck pain, bilateral upper extremity radicular pain/paresthesias, lower extremity pain/paresthesias. Currently patient does endorse neck pain worse when turning head towards right shoulder.  Denies acute bowel or bladder dysfunction, saddle anesthesia.    Patient follows with Dr. Bridger Niño, neurology, for advanced Parkinson's, currently on Sinemet. At baseline patient has tremors and intermittent shuffling gait secondary to her parkinson's disease.    Patient reports she does not routinely take aspirin (ASA 81mg listed on home medications). Takes aspirin 325mg as needed for pain.     Past Medical History   I have reviewed this patient's medical history and updated it with pertinent information if needed.   Past Medical History:   Diagnosis Date    Parkinson's disease (H) 2019       Past Surgical History   I  have reviewed this patient's surgical history and updated it with pertinent information if needed.  Past Surgical History:   Procedure Laterality Date    BREAST SURGERY      GYN SURGERY         Prior to Admission Medications   Prior to Admission Medications   Prescriptions Last Dose Informant Patient Reported? Taking?   Lidocaine (LIDOCARE) 4 % Patch   No No   Sig: Place 1 patch over 12 hours onto the skin every 24 hours. To prevent lidocaine toxicity, patient should be patch free for 12 hrs daily.   Vitamin D3 (VITAMIN D) 10 MCG (400 UNIT) tablet   Yes No   Sig: Take 400 Units by mouth daily.   aspirin 81 MG EC tablet   Yes No   Sig: Take 81 mg by mouth 2 times daily.   carbidopa-levodopa (RYTARY) 48. MG CPCR per CR capsule   No No   Sig: Take 1 capsule by mouth 3 times daily.   carbidopa-levodopa (SINEMET)  MG tablet   No No   Sig: Take 1 tablet by mouth 3 times daily.   cyanocobalamin (VITAMIN B-12) 1000 MCG tablet   No No   Sig: Take 1 tablet (1,000 mcg) by mouth daily.   melatonin 3 MG tablet   Yes No   Sig: Take 6 mg by mouth at bedtime.   oxyCODONE (ROXICODONE) 5 MG tablet   No No   Sig: Take 0.5 tablets (2.5 mg) by mouth every 6 hours as needed for moderate pain.   thiamine (B-1) 100 MG tablet   No No   Sig: Take 1 tablet (100 mg) by mouth daily.   traZODone (DESYREL) 100 MG tablet   No No   Sig: Take 1 tablet (100 mg) by mouth at bedtime.   vitamin C (ASCORBIC ACID) 500 MG tablet   Yes No   Sig: Take 500 mg by mouth daily.      Facility-Administered Medications: None     Allergies   No Known Allergies    Social History   I have reviewed this patient's social history and updated it with pertinent information if needed. Marlene Merida  reports that she has never smoked. She has never been exposed to tobacco smoke. She has never used smokeless tobacco. She reports current alcohol use. She reports that she does not use drugs.    Family History   I have reviewed this patient's family history and  updated it with pertinent information if needed.   Family History   Problem Relation Age of Onset    Alzheimer Disease Mother     Hypertension Mother     Hypertension Father     Arthritis Father     Diabetes Paternal Grandmother     Diabetes Brother     Breast Cancer No family hx of        ROS: 10 point ROS negative other than the symptoms noted above in the HPI.    Physical Exam       BP: (!) 145/76 Pulse: 83   Resp: 16 SpO2: 96 %      Vital Signs with Ranges  Temp:  [97.9  F (36.6  C)] 97.9  F (36.6  C)  Pulse:  [82-88] 83  Resp:  [16-18] 16  BP: (140-170)/(62-76) 145/76  SpO2:  [96 %-98 %] 96 %  0 lbs 0 oz     , Blood pressure (!) 145/76, pulse 83, resp. rate 16, SpO2 96%, not currently breastfeeding.  0 lbs 0 oz    Patient sitting upright in ED bed. NAD.  Bilateral upper and lower extremity tremors  HEENT:  Normocephalic, atraumatic.  PERRL.  EOM s intact.     NEUROLOGICAL EXAMINATION:   Mental status:  Alert and Oriented x 3, speech is fluent.  Cranial nerves:  II-XII intact.   Motor:   Shoulder Abduction:       Right:  4+/5   Left:  4+/5  Elbow Flexion:                Right:  4+/5   Left:  4+/5  Elbow Extension:            Right:  4/5   Left:  4+/5  interosseus :                  Right:  4+/5   Left:  4+/5  Attempts to lift bilateral legs off bed, but unable to fully lift legs off bed against gravity  Able to minimally flex/Extend bilateral knees against gravity   Bilateral PF/EHL/DF 4+/5  Sensation intact BUE/BLE  Pain with turning head toward right  Atkins's positive bilaterally   Clonus negative bilaterally  Gait not formally assessed    Data     CBC RESULTS:   Recent Labs   Lab Test 02/18/25  0739   WBC 6.3   RBC 4.25   HGB 13.8   HCT 40.8   MCV 96   MCH 32.5   MCHC 33.8   RDW 14.1        Basic Metabolic Panel:  Lab Results   Component Value Date     02/18/2025      Lab Results   Component Value Date    POTASSIUM 4.4 02/18/2025    POTASSIUM 4.0 08/08/2022     Lab Results   Component Value  "Date    CHLORIDE 105 02/18/2025    CHLORIDE 108 08/08/2022     Lab Results   Component Value Date    TITO 9.5 02/18/2025     Lab Results   Component Value Date    CO2 30 02/18/2025    CO2 26 08/08/2022     Lab Results   Component Value Date    BUN 22.9 02/18/2025    BUN 21 08/08/2022     Lab Results   Component Value Date    CR 0.54 02/18/2025     Lab Results   Component Value Date    GLC 95 02/18/2025    GLC 98 08/08/2022     INR:  No results found for: \"INR\"      "

## 2025-02-18 NOTE — ED PROVIDER NOTES
EMERGENCY DEPARTMENT ENCOUNTER      NAME: Marlene Merida  AGE: 64 year old female  YOB: 1960  MRN: [id number]  EVALUATION DATE & TIME: 2/18/2025  7:00 AM    PCP: Анна Jennings    ED PROVIDER: Chelsey Nielsen MD    Chief Complaint   Patient presents with    Fall         FINAL IMPRESSION:  1. General weakness    2. Unable to ambulate          ED COURSE & MEDICAL DECISION MAKING:    Pertinent Labs & Imaging studies reviewed. (See chart for details)  64 year old female with history of Parkinson's who presents to the Emergency Department for evaluation of generalized weakness after she stood up to get out of bed today, had no strength in her legs and fell onto carpeted floor.  Did hit her back of her head but denies any LOC, headache, nausea, vomiting.  She is on baby aspirin only.  No midline CT or L-spine tenderness but does complain of pain to the bilateral knees.  Patient currently lives home with home health for OT.  Hospitalized at the end of January and discharged to TCU and has only been home for short period of time.  No external signs of trauma on examination.  Resting tremor consistent with her parkinsonian is him.  Differential includes dehydration, electrolyte abnormality, UTI, failure to thrive, closed head injury and less likely knee fracture.    Patient placed on monitor, IV established and blood obtained.  Given Tylenol for pain as well as her morning sentiment.  CBC, BMP, LFTs, magnesium notable for new mild alkaline phosphatase elevation at 151 and AST of 53.  Urinalysis unremarkable.  CT head, cervical spine unremarkable.  X-ray bilateral knees unremarkable.  Given oxycodone for ongoing pain.  Patient is not able to sit up, stand unassisted and certainly not safe to go home to her own house with home OT.      Case management consulted for potential penitentiary care admission, PT OT assessments ordered.  Case management saw and evaluated patient, will admit for penitentiary care.      ED  Course as of 02/18/25 1435   Tue Feb 18, 2025   0702 I met with the patient.    0824 CT Head w/o Contrast  CT independently interpreted by myself without visualized ICH   0837 Patient unable to sit up for herself or stand to piviot or ambulate at this time she does not have enough strength in her legs and feet.    1159 Patient medically clear.  No indications for inpatient hospital admission but too weak to ambulate.  Once TCU placement.  Seen and evaluated by social work and will be a residential care admission.       Medical Decision Making  Obtained supplemental history:Supplemental history obtained?:  EMS  Reviewed external records: External records reviewed?: Documented in chart and Outpatient Record: 02/13/25, [facility]  Care impacted by chronic illness:Other: Parkinson's Disease  Did you consider but not order tests?: Work up considered but not performed and documented in chart, if applicable  Did you interpret images independently?: Independent interpretation of ECG and images noted in documentation, when applicable.  Consultation discussion with other provider:Did you involve another provider (consultant, , pharmacy, etc.)?: I discussed the care with another health care provider, see documentation for details.  Admit.    MIPS: Adult Minor Head Trauma:Age 65 years or older      At the conclusion of the encounter I discussed the results of all of the tests and the disposition. The questions were answered. The patient or family acknowledged understanding and was agreeable with the care plan.    MEDICATIONS GIVEN IN THE EMERGENCY:  Medications   acetaminophen (TYLENOL) tablet 975 mg (975 mg Oral $Given 2/18/25 9249)   carbidopa-levodopa (SINEMET)  MG per tablet 1 tablet (1 tablet Oral $Given 2/18/25 2347)   oxyCODONE (ROXICODONE) tablet 5 mg (5 mg Oral $Given 2/18/25 0636)       NEW PRESCRIPTIONS STARTED AT TODAY'S ER VISIT  Discharge Medication List as of 2/18/2025 12:19 PM              =================================================================    HPI    Patient information was obtained from: patient     Use of Intrepreter: N/A      Marlene Merida is a 64 year old female with pertinent medical history of advanced parkinson's disease, who presents for evaluation of a fall.     Per chart review, 02/13/25, [facility] ED: patient was hospitalized after a fall. Patient did not endorse any new injuries or pain, but noted she didn't think she can take care of herself. Patient was discharged home.     Patient endorses a fall after getting out of bed this morning. She described the fall being caused by weakness in her legs. She hit her head on the carpet; she did not lose consciousness. Her tremors have increased since the fall.     Patient mentioned that she normally takes her medications at 8:00 AM, and she has not taken them.       PAST MEDICAL HISTORY:  Past Medical History:   Diagnosis Date    Parkinson's disease (H) 2019       PAST SURGICAL HISTORY:  Past Surgical History:   Procedure Laterality Date    BREAST SURGERY      GYN SURGERY         CURRENT MEDICATIONS:    Prior to Admission Medications   Prescriptions Last Dose Informant Patient Reported? Taking?   Lidocaine (LIDOCARE) 4 % Patch 2/17/2025 Noon  No Yes   Sig: Place 1 patch over 12 hours onto the skin every 24 hours. To prevent lidocaine toxicity, patient should be patch free for 12 hrs daily.   Vitamin D3 (VITAMIN D) 10 MCG (400 UNIT) tablet 2/18/2025 Morning  Yes Yes   Sig: Take 400 Units by mouth daily.   aspirin 81 MG EC tablet 2/18/2025 Morning  Yes Yes   Sig: Take 81 mg by mouth 2 times daily.   carbidopa-levodopa (RYTARY) 48. MG CPCR per CR capsule 2/18/2025 at  8:00 AM  No Yes   Sig: Take 1 capsule by mouth 3 times daily.   carbidopa-levodopa (SINEMET)  MG tablet 2/18/2025 at  8:00 AM  No Yes   Sig: Take 1 tablet by mouth 3 times daily.   cyanocobalamin (VITAMIN B-12) 1000 MCG tablet 2/18/2025 Morning  No Yes   Sig:  Take 1 tablet (1,000 mcg) by mouth daily.   melatonin 3 MG tablet 2/17/2025 Evening  Yes Yes   Sig: Take 6 mg by mouth at bedtime.   oxyCODONE (ROXICODONE) 5 MG tablet More than a month  No Yes   Sig: Take 0.5 tablets (2.5 mg) by mouth every 6 hours as needed for moderate pain.   thiamine (B-1) 100 MG tablet 2/18/2025 Morning  No Yes   Sig: Take 1 tablet (100 mg) by mouth daily.   traZODone (DESYREL) 100 MG tablet Past Week Evening  No Yes   Sig: Take 1 tablet (100 mg) by mouth at bedtime.   vitamin C (ASCORBIC ACID) 500 MG tablet 2/18/2025 Morning  Yes Yes   Sig: Take 500 mg by mouth daily.      Facility-Administered Medications: None       ALLERGIES:  No Known Allergies    FAMILY HISTORY:  Family History   Problem Relation Age of Onset    Alzheimer Disease Mother     Hypertension Mother     Hypertension Father     Arthritis Father     Diabetes Paternal Grandmother     Diabetes Brother     Breast Cancer No family hx of        SOCIAL HISTORY:  Social History     Tobacco Use    Smoking status: Never     Passive exposure: Never    Smokeless tobacco: Never   Vaping Use    Vaping status: Never Used   Substance Use Topics    Alcohol use: Yes     Comment: 1-2 beers a month     Drug use: Never        VITALS:  Patient Vitals for the past 24 hrs:   BP Temp Temp src Pulse Resp SpO2 Weight   02/18/25 1130 (!) 149/70 -- -- -- -- -- --   02/18/25 1026 (!) 140/62 -- -- 82 -- 97 % --   02/18/25 0926 (!) 153/67 -- -- 87 -- -- --   02/18/25 0834 (!) 161/71 -- -- -- -- -- --   02/18/25 0704 (!) 170/72 97.9  F (36.6  C) Oral 88 18 98 % 52.2 kg (115 lb)       PHYSICAL EXAM    General Appearance: Well-appearing, well-nourished, no acute distress   Looks older than stated age.   Head:  Normocephalic, atraumatic  Eyes:  PERRL, conjunctiva/corneas clear, EOM's intact  ENT:  membranes are moist without pallor  Neck:  Supple, no midline tenderness palpation  Cardio:  Regular rate and rhythm, hypertensive  Pulm:  No respiratory distress,  clear to auscultation bilaterally  Back:  No midline tenderness to palpation, no paraspinal tenderness, No CVA tenderness, normal ROM  Abdomen:  Soft, non-tender  Extremities: Mild pain with motion of bilateral knees.otherwise, extremities normal, atraumatic, no cyanosis or edema, full ROM and motor tone intact, bilateral pulses intact upper and lower. No obvious trauma.   Skin:  Skin warm, dry, no rashes  Neuro:  Alert and oriented ×3. Resting tremor consistent with history of parkinson's.      RADIOLOGY/LABS:  Reviewed all pertinent imaging. Please see official radiology report. All pertinent labs reviewed and interpreted.    Results for orders placed or performed during the hospital encounter of 02/18/25   CT Head w/o Contrast    Impression    IMPRESSION:  1.  No intracranial hemorrhage, mass lesions, hydrocephalus or CT evidence for an acute infarct.  2.  Mild chronic mucosal thickening of the right maxillary sinus. Mild chronic mucosal thickening of the ethmoid air cells.     CT Cervical Spine w/o Contrast    Impression    IMPRESSION:  1.  No fracture or posttraumatic subluxation.  2.  3.8 mm degenerative spondylolisthesis of C3 on C4.   3.  Severe spinal canal narrowing at C4-C5 and C5-C6. Moderate spinal canal narrowing at C3-C4 and C6-C7.  4.  Severe left and moderate right neuroforaminal narrowing at C3-C4. Moderate bilateral neuroforaminal narrowing at C4-C5. Severe left and mild to moderate right neuroforaminal narrowing at C5-C6.     XR Knee Bilateral 3 Views    Impression    IMPRESSION:   RIGHT KNEE: No fracture or malalignment. No effusion. Mild medial and lateral compartment joint space narrowing.    LEFT KNEE: No fracture or malalignment. No effusion. Mild medial and lateral compartment joint space narrowing.   Basic metabolic panel   Result Value Ref Range    Sodium 141 135 - 145 mmol/L    Potassium 4.4 3.4 - 5.3 mmol/L    Chloride 105 98 - 107 mmol/L    Carbon Dioxide (CO2) 30 (H) 22 - 29 mmol/L     Anion Gap 6 (L) 7 - 15 mmol/L    Urea Nitrogen 22.9 8.0 - 23.0 mg/dL    Creatinine 0.54 0.51 - 0.95 mg/dL    GFR Estimate >90 >60 mL/min/1.73m2    Calcium 9.5 8.8 - 10.4 mg/dL    Glucose 95 70 - 99 mg/dL   Hepatic panel   Result Value Ref Range    Protein Total 6.7 6.4 - 8.3 g/dL    Albumin 4.0 3.5 - 5.2 g/dL    Bilirubin Total 0.3 <=1.2 mg/dL    Alkaline Phosphatase 151 (H) 40 - 150 U/L    AST 53 (H) 0 - 45 U/L    ALT 32 0 - 50 U/L    Bilirubin Direct <0.20 0.00 - 0.30 mg/dL   Result Value Ref Range    Magnesium 2.1 1.7 - 2.3 mg/dL   UA with Microscopic reflex to Culture    Specimen: Urine, Catheter   Result Value Ref Range    Color Urine Light Yellow Colorless, Straw, Light Yellow, Yellow    Appearance Urine Clear Clear    Glucose Urine Negative Negative mg/dL    Bilirubin Urine Negative Negative    Ketones Urine Negative Negative mg/dL    Specific Gravity Urine 1.014 1.001 - 1.030    Blood Urine Negative Negative    pH Urine 6.0 5.0 - 7.0    Protein Albumin Urine Negative Negative mg/dL    Urobilinogen Urine <2.0 <2.0 mg/dL    Nitrite Urine Negative Negative    Leukocyte Esterase Urine Negative Negative    RBC Urine <1 <=2 /HPF    WBC Urine <1 <=5 /HPF   CBC with platelets and differential   Result Value Ref Range    WBC Count 6.3 4.0 - 11.0 10e3/uL    RBC Count 4.25 3.80 - 5.20 10e6/uL    Hemoglobin 13.8 11.7 - 15.7 g/dL    Hematocrit 40.8 35.0 - 47.0 %    MCV 96 78 - 100 fL    MCH 32.5 26.5 - 33.0 pg    MCHC 33.8 31.5 - 36.5 g/dL    RDW 14.1 10.0 - 15.0 %    Platelet Count 370 150 - 450 10e3/uL    % Neutrophils 68 %    % Lymphocytes 21 %    % Monocytes 9 %    % Eosinophils 1 %    % Basophils 1 %    % Immature Granulocytes 0 %    NRBCs per 100 WBC 0 <1 /100    Absolute Neutrophils 4.3 1.6 - 8.3 10e3/uL    Absolute Lymphocytes 1.4 0.8 - 5.3 10e3/uL    Absolute Monocytes 0.6 0.0 - 1.3 10e3/uL    Absolute Eosinophils 0.1 0.0 - 0.7 10e3/uL    Absolute Basophils 0.0 0.0 - 0.2 10e3/uL    Absolute Immature Granulocytes  0.0 <=0.4 10e3/uL    Absolute NRBCs 0.0 10e3/uL         The creation of this record is based on the scribe s observations of the work being performed by Chelsey Nielsen MD and the provider s statements to them. It was created on her behalf by Petey Danielle, a trained medical scribe. This document has been checked and approved by the attending provider.    Chelsey Nielsen MD  Emergency Medicine  [facility] EMERGENCY DEPARTMENT  [address] [place] [id number]  [phone]  Dept: [phone]       Chelsey Nielsen MD  02/18/25 8337

## 2025-02-18 NOTE — ED TRIAGE NOTES
She comes from home where she uses a walker due to her Parkinson's. Today she was getting out of bed when she missed the floor with her foot and fell forward striking her head on the carpeted floor. Denies LOC. She rolled over onto her back and attempted to get up but was not able to. She called for EMS. She is alert oriented and complains of low back pain at this time.

## 2025-02-18 NOTE — ED PROVIDER NOTES
prison care visit. See previous ED encounter note.    MD Morales Vo Zabrina N, MD  02/18/25 8229

## 2025-02-18 NOTE — PROGRESS NOTES
"Discharge planning assist    Length of Stay (days): 3    Expected Discharge Date: 01/02/2025     Concerns to be Addressed:  Discharge planing.    Transportation: TBD    PT Recommendations:    OT Recommendations:        medical stability, placement    Prior Living Situation: apartment with other (see comments) (\"with a roommate\")    Anticipated Discharge Disposition: To be determined by destination, patient/family preferences, medical needs and mobility status closer to the time of discharge.  Anticipated Discharge Services: Continued therapy, skilled nursing and medical management.   Anticipated Discharge DME: Per therapy (if indicated).    Discussed  Partnership in Safe Discharge Planning  document with patient/family: No     Handoff Completed: Yes    Patient/Spokesperson Updated: Yes. Who? Patient Marlene    Referrals Placed by CM/SW: Post acute care  Private pay costs discussed: See previous care management notes.     Additional Information:  Patient lives alone and has a history of Parkinsons. She was discharged to home from [place] and did not have home care services. She thinks she should 'go to TCU' but seems to think she should be able to stay indefinitely. Writer explained that insurance only covers TCU while patients are making progress toward a goal of discharge to home. Typically, TCU stays are 10-14 days with a goal of home. Patient indicated that she had not understood that but still would like to try for TCU. Discussed possibility of long term care and/or assisted living. Writer explained that obtaining funding for [facility] may take a long time but patient states her daughter Bibiana has already started the application for EW. Patient stated, \"I told her to throw that in the trash\" but writer educated patient that the EW is very important and, as patient will likely need it eventually, they should proceed with the application. Patient asked writer to call her daughter to review.  Transitional care (TCU) is " desired by patient for continued therapy and skilled nursing. She agreed to have referrals made to the [facility]. Therapy evaluations pending.   2:18 PM:  Writer left a message for patient's daughter. [facility] face sheet sent to TCU admissions as patient's insurance is missing from her face sheet. Explained patient is here under Observation currently.   2:57 PM:  Received a call back from Bibiana stating that she is still working with [facility] for . She agrees with nursing home placement until [facility] is found.     Contacts: Daughter Bibiana at [phone]  Yuli Jerome RN

## 2025-02-18 NOTE — ED NOTES
Patient needed to go to the bathroom. She is unable to get up out of bed. Brought patient to the bathroom from her hallway bed. She tried to use the bedpan but was unable. Will try and give a urine sample after CT scan.

## 2025-02-18 NOTE — PROGRESS NOTES
Patient discharged from this encounter in error. Please see encounter from same date for full HP.

## 2025-02-18 NOTE — H&P
[facility]    History and Physical - Hospitalist Service       Date of Admission:  2/18/2025    Assessment & Plan    Marlene Merida is a 64 year old female with PMHx of advanced Parkinson's, sciatica who was admitted on 2/18/2025. Recently hospitalized 1/25 - 1/30/25 for low back pain, she was seen by neurology for her Parkinson's , was added on immediate release Sinemet and discharged to TCU.      She was discharged to home from TCU on 2/7/25 but had a fall this morning and presented to the ER for evaluation.  CT C-spine showed severe spinal canal stenosis, admitted to observation for PT/OT/CM, neurosurgery and neurology consultation.    Fall at Home   Generalized Weakness   Physical Deconditioning   Please see the encounters from the same date for more details of her presentation and workup in the ER  -- CT head today for acute findings  -- UA negative   -- CBC and BMP unremarkable   -- PT/OT/CM   -- Fall precautions     Spinal Canal Stenosis   Endorses numbness to the base of her neck, with bilateral upper extremity pain/paresthesias/weakness  -- CT C spine showing severe spinal canal narrowing at C4-C5 and C5-C6. Moderate spinal canal narrowing at C3-C4 and C6-C7. Severe left and moderate right neuroforaminal narrowing at C3-C4. Moderate bilateral neuroforaminal narrowing at C4-C5. Severe left and mild to moderate right neuroforaminal narrowing at C5-C6  -- MRI w/o contrast ordered    -- NSGY consulted ; recs pending MRI imaging   -- Pain control with Tylenol, PRN oxycodone, lido patch     Advanced Parkinson's Disease   Chronic pain  -- Continue PTA Rytary and Sinemet . Of note, patient had been taking Rytary and Sinemet at the same times throughout the day. Will adjust timing to alternate per neurology note at recent admission   -- Continue PTA trazadone nightly   -- Neurology consult placed - appreciate guidance on how to space out/time dosing of Rytary and Sinemet  -- Pain control as  above  -- PT/OT/CM       Observation Goals: -diagnostic tests and consults completed and resulted, -vital signs normal or at patient baseline, -tolerating oral intake to maintain hydration, -adequate pain control on oral analgesics, -returns to baseline functional status, -safe disposition plan has been identified, Nurse to notify provider when observation goals have been met and patient is ready for discharge.  Diet: Regular Diet Adult    DVT Prophylaxis: Pneumatic Compression Devices  Gilliam Catheter: Not present  Lines: None     Cardiac Monitoring: None  Code Status: Full Code      Clinically Significant Risk Factors Present on Admission                 # Drug Induced Platelet Defect: home medication list includes an antiplatelet medication                 # Financial/Environmental Concerns:           Disposition Plan     Medically Ready for Discharge: Anticipated in 2-4 Days       The patient's care was discussed with the Attending Physician, Dr. Reece Borden who independently met with and assessed the patient and is in agreement with the assessment and plan     Kathy Og PA-C  Hospitalist Service  [facility]  Securely message with [employer] (more info)  Text page via [facility] Paging/Directory     ______________________________________________________________________    Chief Complaint   Fall at Home   Generalized Weakness   Parkinson's     History is obtained from the patient    History of Present Illness   Marlene Merida is a 64 year old female with PMHx of Parkinson's, sciatica who was admitted on 2/18/2025. Recently hospitalized 1/25 -1/30/25 for low back pain, she was seen by neurology for her Parkinson's , was added on immediate release Sinemet and discharged to TCU.      Patient states she was discharged from the TCU on 2/7/2025.  She states she was doing more or less okay at home until she had a fall this morning.  States she woke up feeling significantly weak, swung her legs off the  edge of the bed but fell on to her carpeted floor, first onto her knees and then on to her head.  Denies any loss of consciousness.  Phone was nearby and she was able to call for help.  She states her Parkinson's symptoms fluctuate and is struggling with cares at home and is hopeful for discharge to a rehab facility.     She endorses chronic generalized pain mostly to her lower and upper extremities.  Endorses numbness to the base of her neck with bilateral upper extremity pain, stiffness and paresthesias.  Does have known history of sciatica that she also states fluctuates.      I attempted to call daughter to provide update per patient request, no answer at this time    Past Medical History    Past Medical History:   Diagnosis Date    Parkinson's disease (H) 2019       Past Surgical History   Past Surgical History:   Procedure Laterality Date    BREAST SURGERY      GYN SURGERY         Prior to Admission Medications   Prior to Admission Medications   Prescriptions Last Dose Informant Patient Reported? Taking?   Lidocaine (LIDOCARE) 4 % Patch   No No   Sig: Place 1 patch over 12 hours onto the skin every 24 hours. To prevent lidocaine toxicity, patient should be patch free for 12 hrs daily.   Vitamin D3 (VITAMIN D) 10 MCG (400 UNIT) tablet   Yes No   Sig: Take 400 Units by mouth daily.   aspirin 81 MG EC tablet   Yes No   Sig: Take 81 mg by mouth 2 times daily.   carbidopa-levodopa (RYTARY) 48. MG CPCR per CR capsule   No No   Sig: Take 1 capsule by mouth 3 times daily.   carbidopa-levodopa (SINEMET)  MG tablet   No No   Sig: Take 1 tablet by mouth 3 times daily.   cyanocobalamin (VITAMIN B-12) 1000 MCG tablet   No No   Sig: Take 1 tablet (1,000 mcg) by mouth daily.   melatonin 3 MG tablet   Yes No   Sig: Take 6 mg by mouth at bedtime.   oxyCODONE (ROXICODONE) 5 MG tablet   No No   Sig: Take 0.5 tablets (2.5 mg) by mouth every 6 hours as needed for moderate pain.   thiamine (B-1) 100 MG tablet   No No    Sig: Take 1 tablet (100 mg) by mouth daily.   traZODone (DESYREL) 100 MG tablet   No No   Sig: Take 1 tablet (100 mg) by mouth at bedtime.   vitamin C (ASCORBIC ACID) 500 MG tablet   Yes No   Sig: Take 500 mg by mouth daily.      Facility-Administered Medications: None           Physical Exam   Vital Signs: Temp: 97.5  F (36.4  C) Temp src: Oral BP: (!) 156/70 Pulse: 87   Resp: 18 SpO2: 95 % O2 Device: None (Room air)    Weight: 115 lbs 0 oz    Constitutional: awake, alert, no apparent distress, and appears stated age  Respiratory: No increased work of breathing, no accessory muscle use, clear to auscultation bilaterally, no crackles or wheezing  Cardiovascular: Regular rate and rhythm, normal S1 and S2  GI: Soft, non-distended, non-tender, normal bowel sounds, no masses palpated, no hepatosplenomegaly  Skin: Normal skin color, texture, turgor. No rashes and no jaundice  Musculoskeletal: 1/5 strength in RLE, unable to raise LLE. Decreased ROM to bilateral upper extremities secondary to pain and weakness. 3/5  strength bilaterally.   Neurologic: Awake, alert.   Neuropsychiatric: Appropriate mood, affect and eye contact. Cooperative.    Medical Decision Making             Data     I have personally reviewed the following data over the past 24 hrs:    6.3  \   13.8   / 370     141 105 22.9 /  95   4.4 30 (H) 0.54 \     ALT: 32 AST: 53 (H) AP: 151 (H) TBILI: 0.3   ALB: 4.0 TOT PROTEIN: 6.7 LIPASE: N/A       Imaging results reviewed over the past 24 hrs:   Recent Results (from the past 24 hours)   CT Head w/o Contrast    Narrative    EXAM: CT HEAD W/O CONTRAST  LOCATION: [facility]  DATE: 2/18/2025    INDICATION: Trauma  COMPARISON: Brain MRI 12/10/2024  TECHNIQUE: Routine CT Head without IV contrast. Multiplanar reformats. Dose reduction techniques were used.    FINDINGS:  INTRACRANIAL CONTENTS: No intracranial hemorrhage. Normal cerebral parenchymal volume. No midline shift. The basilar  cisterns are patent. No cerebellar tonsillar ectopia. No CT evidence for an acute infarct.    VISUALIZED ORBITS/SINUSES/MASTOIDS: No intraorbital abnormality. Mild chronic mucosal thickening of the right maxillary sinus. Mild chronic mucosal thickening of the ethmoid air cells. No middle ear or mastoid effusion.    BONES/SOFT TISSUES: No acute abnormality.      Impression    IMPRESSION:  1.  No intracranial hemorrhage, mass lesions, hydrocephalus or CT evidence for an acute infarct.  2.  Mild chronic mucosal thickening of the right maxillary sinus. Mild chronic mucosal thickening of the ethmoid air cells.     CT Cervical Spine w/o Contrast    Narrative    EXAM: CT CERVICAL SPINE W/O CONTRAST  LOCATION: [facility]  DATE: 2/18/2025    INDICATION: Trauma, injury.  COMPARISON: None.  TECHNIQUE: Routine CT Cervical Spine without IV contrast. Multiplanar reformats. Dose reduction techniques were used.    FINDINGS:  VERTEBRA: The patient's head is tilted to the left. Mild straightening of the normal cervical lordosis. 3.8 mm spondylolisthesis of C3 on C4. 2.3 mm retrolisthesis from C4-C5 to C6-C7. The craniocervical junction is within normal limits. Vertebral body   heights are maintained. No prevertebral edema. No fractures.    CANAL/FORAMINA: Moderate intervertebral disc height loss from C3-C4 to C6-C7. Mild spinal canal narrowing at C2-C3. Moderate spinal canal narrowing at C3-C4. Severe spinal canal narrowing at C4-C5 and C5-C6. Moderate spinal canal narrowing at C6-C7.    Severe left and moderate right neuroforaminal narrowing at C3-C4. Moderate bilateral neuroforaminal narrowing at C4-C5. Severe left and mild to moderate right neuroforaminal narrowing at C5-C6 moderate bilateral neuroforaminal narrowing at C6-7. Mild   left neuroforaminal narrowing at C7-T1.    PARASPINAL: The lung apices are clear.      Impression    IMPRESSION:  1.  No fracture or posttraumatic subluxation.  2.  3.8 mm  degenerative spondylolisthesis of C3 on C4.   3.  Severe spinal canal narrowing at C4-C5 and C5-C6. Moderate spinal canal narrowing at C3-C4 and C6-C7.  4.  Severe left and moderate right neuroforaminal narrowing at C3-C4. Moderate bilateral neuroforaminal narrowing at C4-C5. Severe left and mild to moderate right neuroforaminal narrowing at C5-C6.     XR Knee Bilateral 3 Views    Narrative    EXAM: XR KNEE BILATERAL 3 VIEWS  LOCATION: [facility]  DATE: 2/18/2025    INDICATION: trauma  COMPARISON: 02/29/2024      Impression    IMPRESSION:   RIGHT KNEE: No fracture or malalignment. No effusion. Mild medial and lateral compartment joint space narrowing.    LEFT KNEE: No fracture or malalignment. No effusion. Mild medial and lateral compartment joint space narrowing.

## 2025-02-18 NOTE — ED TRIAGE NOTES
Patient admitted to observation after senior living care. See triage note from 2/18/25 below.    Kyle Biswas, RN   Registered Nurse  Nursing     ED Triage Notes  Signed     Date of Service: 2/18/2025  7:01 AM  Creation Time: 2/18/2025  7:01 AM     Expand All Collapse All    She comes from home where she uses a walker due to her Parkinson's. Today she was getting out of bed when she missed the floor with her foot and fell forward striking her head on the carpeted floor. Denies LOC. She rolled over onto her back and attempted to get up but was not able to. She called for EMS. She is alert oriented and complains of low back pain at this time.

## 2025-02-18 NOTE — PHARMACY-ADMISSION MEDICATION HISTORY
Admission medication history completed at [facility]. Please see Pharmacist Admission Medication History note from 02/18/2025.

## 2025-02-18 NOTE — CONSULTS
Care Management Medical penitentiary Outpatient Consult:    Care management consulted by ED provider for penitentiary assessment.  CM spoke with provider to discuss penitentiary case. Provider has confirmed patient is medically stable for discharge.    Background information (reason for presentation to ED): Has Parkinson's. Was recently at [facility] in [place] but was discharged around 2/7 home with no additional services. Patient's mobility/functionality fluctuate due to her Parkinson's. She would like to have another TCU stay as she cannot manage at home on her own and her children are not able to logistically care for her within her own home.     Provider recommended discharge disposition:  likely TCU but ultimately needs [facility] or LTC.     Resources needed for discharge: >24 hrs for new TCU/LTC    Estimated timeline for needed resources: 0-4 hrs (ambulatory referral, county contacts, non-urgent home care), 4-24 hrs (critical home care need),  >24 hrs (new TCU/LTC, decision maker challenges)    Barriers to discharge: facility/bed availability    CM met/spoke with patient/family at bedside/via phone.  Discussed patient is medically stable for discharge from ED & medical insurance will likely not cover the hospital stay.  Discussed recommended discharge disposition and resources needed, included noted barriers.        Discussed above with ED provider.      Next steps:   Anticipate patient will discharge to TCU, longterm or LTC with facility staff to help care for her.     Anticipate patient will transition to penitentiary care due to no medical need for hospital admission.  CM &  presented patient with penitentiary care financial notice for signature.  CM notified ED staff of signed financial notice to initiate encounter change.  CM team will continue work towards safe discharge plan.        Comfort Worthy RN  [facility] ED Care Manager  Desk Phone #: [phone]  Cell #:  [phone]

## 2025-02-18 NOTE — ED NOTES
Bed: [facility]  Expected date:   Expected time:   Means of arrival:   Comments:  Pt returning for shelter care

## 2025-02-18 NOTE — ED NOTES
Patient unable to sit up for herself or stand to piviot or ambulate at this time she does not have enough strength in her legs and feet. MD notified.

## 2025-02-19 ENCOUNTER — APPOINTMENT (OUTPATIENT)
Dept: PHYSICAL THERAPY | Facility: HOSPITAL | Age: 65
End: 2025-02-19
Payer: COMMERCIAL

## 2025-02-19 ENCOUNTER — APPOINTMENT (OUTPATIENT)
Dept: OCCUPATIONAL THERAPY | Facility: HOSPITAL | Age: 65
End: 2025-02-19
Payer: COMMERCIAL

## 2025-02-19 LAB
ANION GAP SERPL CALCULATED.3IONS-SCNC: 11 MMOL/L (ref 7–15)
BUN SERPL-MCNC: 19.3 MG/DL (ref 8–23)
CALCIUM SERPL-MCNC: 9.4 MG/DL (ref 8.8–10.4)
CHLORIDE SERPL-SCNC: 104 MMOL/L (ref 98–107)
CREAT SERPL-MCNC: 0.53 MG/DL (ref 0.51–0.95)
EGFRCR SERPLBLD CKD-EPI 2021: >90 ML/MIN/1.73M2
ERYTHROCYTE [DISTWIDTH] IN BLOOD BY AUTOMATED COUNT: 14 % (ref 10–15)
GLUCOSE SERPL-MCNC: 91 MG/DL (ref 70–99)
HCO3 SERPL-SCNC: 27 MMOL/L (ref 22–29)
HCT VFR BLD AUTO: 42.8 % (ref 35–47)
HGB BLD-MCNC: 14.6 G/DL (ref 11.7–15.7)
MCH RBC QN AUTO: 32.4 PG (ref 26.5–33)
MCHC RBC AUTO-ENTMCNC: 34.1 G/DL (ref 31.5–36.5)
MCV RBC AUTO: 95 FL (ref 78–100)
PLATELET # BLD AUTO: 351 10E3/UL (ref 150–450)
POTASSIUM SERPL-SCNC: 4.2 MMOL/L (ref 3.4–5.3)
RBC # BLD AUTO: 4.5 10E6/UL (ref 3.8–5.2)
SODIUM SERPL-SCNC: 142 MMOL/L (ref 135–145)
WBC # BLD AUTO: 7.8 10E3/UL (ref 4–11)

## 2025-02-19 PROCEDURE — 80048 BASIC METABOLIC PNL TOTAL CA: CPT

## 2025-02-19 PROCEDURE — 250N000013 HC RX MED GY IP 250 OP 250 PS 637: Performed by: PSYCHIATRY & NEUROLOGY

## 2025-02-19 PROCEDURE — G0378 HOSPITAL OBSERVATION PER HR: HCPCS

## 2025-02-19 PROCEDURE — 250N000013 HC RX MED GY IP 250 OP 250 PS 637

## 2025-02-19 PROCEDURE — 97162 PT EVAL MOD COMPLEX 30 MIN: CPT | Mod: GP

## 2025-02-19 PROCEDURE — 97535 SELF CARE MNGMENT TRAINING: CPT | Mod: GO

## 2025-02-19 PROCEDURE — 85027 COMPLETE CBC AUTOMATED: CPT

## 2025-02-19 PROCEDURE — 99417 PROLNG OP E/M EACH 15 MIN: CPT | Performed by: PSYCHIATRY & NEUROLOGY

## 2025-02-19 PROCEDURE — 97530 THERAPEUTIC ACTIVITIES: CPT | Mod: GP

## 2025-02-19 PROCEDURE — 97166 OT EVAL MOD COMPLEX 45 MIN: CPT | Mod: GO

## 2025-02-19 PROCEDURE — 99215 OFFICE O/P EST HI 40 MIN: CPT | Performed by: PSYCHIATRY & NEUROLOGY

## 2025-02-19 PROCEDURE — 99233 SBSQ HOSP IP/OBS HIGH 50: CPT | Performed by: INTERNAL MEDICINE

## 2025-02-19 PROCEDURE — 250N000013 HC RX MED GY IP 250 OP 250 PS 637: Performed by: INTERNAL MEDICINE

## 2025-02-19 PROCEDURE — 82374 ASSAY BLOOD CARBON DIOXIDE: CPT

## 2025-02-19 PROCEDURE — 36415 COLL VENOUS BLD VENIPUNCTURE: CPT

## 2025-02-19 RX ORDER — ENOXAPARIN SODIUM 100 MG/ML
40 INJECTION SUBCUTANEOUS EVERY 24 HOURS
Status: DISCONTINUED | OUTPATIENT
Start: 2025-02-20 | End: 2025-02-20 | Stop reason: HOSPADM

## 2025-02-19 RX ORDER — GABAPENTIN 100 MG/1
200 CAPSULE ORAL 3 TIMES DAILY
Status: DISCONTINUED | OUTPATIENT
Start: 2025-02-19 | End: 2025-02-20 | Stop reason: HOSPADM

## 2025-02-19 RX ADMIN — CARBIDOPA AND LEVODOPA 1 TABLET: 25; 100 TABLET ORAL at 23:10

## 2025-02-19 RX ADMIN — TRAZODONE HYDROCHLORIDE 100 MG: 50 TABLET ORAL at 23:10

## 2025-02-19 RX ADMIN — CARBIDOPA AND LEVODOPA 1 TABLET: 25; 100 TABLET ORAL at 02:57

## 2025-02-19 RX ADMIN — GABAPENTIN 200 MG: 100 CAPSULE ORAL at 15:56

## 2025-02-19 RX ADMIN — GABAPENTIN 200 MG: 100 CAPSULE ORAL at 19:55

## 2025-02-19 RX ADMIN — LEVODOPA AND CARBIDOPA 1 CAPSULE: 195; 48.75 CAPSULE, EXTENDED RELEASE ORAL at 23:15

## 2025-02-19 RX ADMIN — CARBIDOPA AND LEVODOPA 1 TABLET: 25; 100 TABLET ORAL at 15:58

## 2025-02-19 RX ADMIN — LEVODOPA AND CARBIDOPA 1 CAPSULE: 195; 48.75 CAPSULE, EXTENDED RELEASE ORAL at 07:59

## 2025-02-19 RX ADMIN — ASPIRIN 81 MG: 81 TABLET, COATED ORAL at 19:55

## 2025-02-19 RX ADMIN — ACETAMINOPHEN 650 MG: 325 TABLET ORAL at 14:58

## 2025-02-19 RX ADMIN — LIDOCAINE 1 PATCH: 4 PATCH TOPICAL at 16:04

## 2025-02-19 RX ADMIN — LEVODOPA AND CARBIDOPA 1 CAPSULE: 195; 48.75 CAPSULE, EXTENDED RELEASE ORAL at 15:57

## 2025-02-19 RX ADMIN — OXYCODONE HYDROCHLORIDE 5 MG: 5 TABLET ORAL at 07:57

## 2025-02-19 RX ADMIN — CARBIDOPA AND LEVODOPA 1 TABLET: 25; 100 TABLET ORAL at 11:31

## 2025-02-19 RX ADMIN — ASPIRIN 81 MG: 81 TABLET, COATED ORAL at 07:57

## 2025-02-19 ASSESSMENT — ACTIVITIES OF DAILY LIVING (ADL)
ADLS_ACUITY_SCORE: 54
ADLS_ACUITY_SCORE: 59
ADLS_ACUITY_SCORE: 59
ADLS_ACUITY_SCORE: 61
ADLS_ACUITY_SCORE: 61
ADLS_ACUITY_SCORE: 59
ADLS_ACUITY_SCORE: 61
ADLS_ACUITY_SCORE: 61
ADLS_ACUITY_SCORE: 52
ADLS_ACUITY_SCORE: 55
ADLS_ACUITY_SCORE: 54
ADLS_ACUITY_SCORE: 55
ADLS_ACUITY_SCORE: 59
ADLS_ACUITY_SCORE: 52
ADLS_ACUITY_SCORE: 61
ADLS_ACUITY_SCORE: 61
ADLS_ACUITY_SCORE: 54
ADLS_ACUITY_SCORE: 59

## 2025-02-19 NOTE — CARE PLAN
"PRIMARY DIAGNOSIS: \"GENERIC\" NURSING  OUTPATIENT/OBSERVATION GOALS TO BE MET BEFORE DISCHARGE:  ADLs back to baseline: No    Activity and level of assistance: Not OOB     Pain status: Improved-controlled with oral pain medications.Patient c/o 9/10 back, neck and both legs pain; 5 mg PRN oxycodone was given.    Return to near baseline physical activity: No     Discharge Planner Nurse   Safe discharge environment identified: No  Barriers to discharge: Yes       Entered by: Kristal Guevara RN 02/19/2025 3:07 AM     Please review provider order for any additional goals.   Nurse to notify provider when observation goals have been met and patient is ready for discharge.  "

## 2025-02-19 NOTE — CONSULTS
NEUROLOGY CONSULTATION NOTE     Marlene Merida,  1960, MRN [id number] Date: 2025     [facility]   Code status:  Full Code   PCP: Анна Jennings, [phone]      ASSESSMENT & PLAN   Diagnosis code: Parkinson's disease    Parkinson's disease  Severe cervical spinal stenosis    Patient's leg symptoms are most likely related to the cervical spinal stenosis.  These would not be completely consistent with Parkinson's disease.  Neurosurgical consultation for further evaluation of cervical spinal stenosis  Recommend taking immediate release Sinemet and Rytary 3 times daily at the same time instead of different times as the former was working better for her.  Parkinson's disease appears appropriately controlled at the time when I was examining her.  Encouraged her not to mix these medications with food as that affects the absorption (she is currently taking them with food)  Physical therapy  Outpatient follow-up with Dr. Niño in 2 weeks.  She already has a clinic appointment.    Discharge:-Per primary team.         Chief Complaint   Patient presents with    Generalized Weakness        HISTORY OF PRESENT ILLNESS     We have been requested by Dr. Og to evaluate Marlene Merida who is a 64 year old  female for evaluation of Parkinson's disease.  This is a 64-year-old female recently admitted to the hospital for low back pain, bilateral leg pain and Parkinson's disease.  She at baseline is on Rytary and immediate release Sinemet was added on top of that.  She reports that things were working better at home then but now in the hospital.  She was taking the immediate release at the same time as the Rytary and reports that her dose has been changed in the hospital which is making the leg symptoms worse.  Does complain of some difficulty with ambulation.  MRI C-spine has shown severe spinal stenosis.  Neurosurgery is involved.  No other new symptoms.  Tremors are under good control.  Per patient no recent  falls.     PAST MEDICAL & SURGICAL HISTORY     Medical History  Past Medical History:   Diagnosis Date    Parkinson's disease (H) 2019     Surgical History  Past Surgical History:   Procedure Laterality Date    BREAST SURGERY      GYN SURGERY          SOCIAL HISTORY     Social History     Tobacco Use    Smoking status: Never     Passive exposure: Never    Smokeless tobacco: Never   Vaping Use    Vaping status: Never Used   Substance Use Topics    Alcohol use: Yes     Comment: 1-2 beers a month     Drug use: Never        FAMILY HISTORY     Reviewed, and family history includes Alzheimer Disease in her mother; Arthritis in her father; Diabetes in her brother and paternal grandmother; Hypertension in her father and mother.     ALLERGIES     No Known Allergies     REVIEW OF SYSTEMS     12 system ROS was done within the HPI this was negative.  Pertinent positives noted in the HPI.     HOME & HOSPITAL MEDICATIONS     Prior to Admission Medications  Medications Prior to Admission   Medication Sig Dispense Refill Last Dose/Taking    aspirin 81 MG EC tablet Take 81 mg by mouth 2 times daily.       carbidopa-levodopa (RYTARY) 48. MG CPCR per CR capsule Take 1 capsule by mouth 3 times daily. 270 capsule 1     carbidopa-levodopa (SINEMET)  MG tablet Take 1 tablet by mouth 3 times daily. 270 tablet 1     cyanocobalamin (VITAMIN B-12) 1000 MCG tablet Take 1 tablet (1,000 mcg) by mouth daily. 30 tablet 1     Lidocaine (LIDOCARE) 4 % Patch Place 1 patch over 12 hours onto the skin every 24 hours. To prevent lidocaine toxicity, patient should be patch free for 12 hrs daily. 14 patch 1     melatonin 3 MG tablet Take 6 mg by mouth at bedtime.       oxyCODONE (ROXICODONE) 5 MG tablet Take 0.5 tablets (2.5 mg) by mouth every 6 hours as needed for moderate pain. 10 tablet 0     thiamine (B-1) 100 MG tablet Take 1 tablet (100 mg) by mouth daily. 90 tablet 0     traZODone (DESYREL) 100 MG tablet Take 1 tablet (100 mg) by mouth  "at bedtime. 30 tablet 1     vitamin C (ASCORBIC ACID) 500 MG tablet Take 500 mg by mouth daily.       Vitamin D3 (VITAMIN D) 10 MCG (400 UNIT) tablet Take 400 Units by mouth daily.          Hospital Medications  Current Facility-Administered Medications   Medication Dose Route Frequency Provider Last Rate Last Admin    aspirin EC tablet 81 mg  81 mg Oral BID Kathy Og PA-C   81 mg at 02/19/25 0757    carbidopa-levodopa (RYTARY) 48. MG per CR capsule 1 capsule  1 capsule Oral TID Kathy Og PA-C   1 capsule at 02/19/25 0759    carbidopa-levodopa (SINEMET)  MG per tablet 1 tablet  1 tablet Oral TID Refugio Bruner MD   1 tablet at 02/19/25 1131    [START ON 2/20/2025] enoxaparin ANTICOAGULANT (LOVENOX) injection 40 mg  40 mg Subcutaneous Q24H John Vazquez MD        gabapentin (NEURONTIN) capsule 200 mg  200 mg Oral TID John Vazquez MD        Lidocaine (LIDOCARE) 4 % Patch 1 patch  1 patch Transdermal Q24H Kathy Og PA-C   1 patch at 02/18/25 1809    traZODone (DESYREL) tablet 100 mg  100 mg Oral At Bedtime Kathy Og PA-C   100 mg at 02/18/25 2117        PHYSICAL EXAM     Vital signs  Temp:  [97.5  F (36.4  C)-98.7  F (37.1  C)] 98.7  F (37.1  C)  Pulse:  [79-90] 82  Resp:  [18-20] 18  BP: (104-156)/(51-95) 104/51  SpO2:  [95 %-98 %] 97 %    PHYSICAL EXAMINATION  VITALS: /51 (BP Location: Left arm)   Pulse 82   Temp 98.7  F (37.1  C) (Oral)   Resp 18   Ht 1.676 m (5' 6\")   Wt 51.5 kg (113 lb 8.6 oz)   LMP  (LMP Unknown)   SpO2 97%   BMI 18.33 kg/m    GENERAL -Health appearing, No apparent distress  EYES- No scleral icterus, no eyelid droop, Pupils - see Neuro section  HEENT - Normocephalic, atraumatic, Hearing grossly intact; Oral mucosa moist and pink in color. External Ears and nose intact.   Neck - supple   PULM - Good spontaneous respiratory effort;   CV- Pedal pulses present with no peripheral edema/ No significant varicosities.  MSK- Gait - see Neuro section; Strength and " tone- see Neuro section; Range of motion grossly intact.  PSYCH -cooperative    Neurological  Mental status - Patient is awake and grossly oriented to self, place and time. Attention span is normal. Language is fluent and follows commands appropriately.   Cranial nerves - CN II-XII intact. Pupils are reactive and symmetric; EOMI, VFIT, NLF symmetric  Motor - There is no pronator drift. Motor exam shows 5/5 strength in all extremities with subjective weakness proximally in both lower extremities.  Might be related to poor effort.  Tone - Tone is symmetric bilaterally in upper and lower extremities.  No cogwheeling rigidity or tremor noted.  Reflexes -toes are equal vocal.  Sensation - Sensory exam is grossly intact to light touch, pain.  Coordination - Finger to nose and heel to shin is without dysmetria.  Gait and station --able to ambulate with assistance.  Formal gait testing cannot be done due to safety concerns from ongoing medical issues.     DIAGNOSTIC STUDIES     Pertinent Radiology   MRI L spine  IMPRESSION:  1.  Moderately advanced thoracolumbar levoscoliosis with multilevel disc degeneration and facet hypertrophy as described above. Negative for spinal canal stenosis. Multifocal mild to moderate neural foraminal stenoses.  2.  Modic type I endplate change at L2-L3.    MRI brain  IMPRESSION:  1.  No acute intracranial process.  2.  Generalized brain atrophy and presumed microvascular ischemic changes as detailed above.    MRI C spine  IMPRESSION:     1.  Extensive multilevel cervical spondylosis as detailed above.  2.  Spinal canal stenosis is greatest and advanced at C3-C4, C4-C5, and C5-C6. There is also moderate spinal canal stenosis at C6-C7.  3.  Neural foraminal stenosis is greatest and advanced on the left at C3-C4 and C5-C6 as well as the right at C4-C5. Less severe degrees of neural foraminal narrowing as above.  4.  No definite cord signal abnormality.  5.  Mild periarticular edema associated with  the left facet joint at C3-C4 is most likely degenerative in etiology, though warrants correlation against any clinical evidence of infection given a small associated facet joint effusion (e.g. rule out septic   arthritis).    Component      Latest Ref Rng 2/19/2025  8:08 AM   Sodium      135 - 145 mmol/L 142    Potassium      3.4 - 5.3 mmol/L 4.2    Chloride      98 - 107 mmol/L 104    Carbon Dioxide (CO2)      22 - 29 mmol/L 27    Anion Gap      7 - 15 mmol/L 11    Urea Nitrogen      8.0 - 23.0 mg/dL 19.3    Creatinine      0.51 - 0.95 mg/dL 0.53    GFR Estimate      >60 mL/min/1.73m2 >90    Calcium      8.8 - 10.4 mg/dL 9.4    Glucose      70 - 99 mg/dL 91    WBC      4.0 - 11.0 10e3/uL 7.8    RBC Count      3.80 - 5.20 10e6/uL 4.50    Hemoglobin      11.7 - 15.7 g/dL 14.6    Hematocrit      35.0 - 47.0 % 42.8    MCV      78 - 100 fL 95    MCH      26.5 - 33.0 pg 32.4    MCHC      31.5 - 36.5 g/dL 34.1    RDW      10.0 - 15.0 % 14.0    Platelet Count      150 - 450 10e3/uL 351          Recent Results (from the past 24 hours)   Basic metabolic panel    Collection Time: 02/19/25  8:08 AM   Result Value Ref Range    Sodium 142 135 - 145 mmol/L    Potassium 4.2 3.4 - 5.3 mmol/L    Chloride 104 98 - 107 mmol/L    Carbon Dioxide (CO2) 27 22 - 29 mmol/L    Anion Gap 11 7 - 15 mmol/L    Urea Nitrogen 19.3 8.0 - 23.0 mg/dL    Creatinine 0.53 0.51 - 0.95 mg/dL    GFR Estimate >90 >60 mL/min/1.73m2    Calcium 9.4 8.8 - 10.4 mg/dL    Glucose 91 70 - 99 mg/dL   CBC with platelets    Collection Time: 02/19/25  8:08 AM   Result Value Ref Range    WBC Count 7.8 4.0 - 11.0 10e3/uL    RBC Count 4.50 3.80 - 5.20 10e6/uL    Hemoglobin 14.6 11.7 - 15.7 g/dL    Hematocrit 42.8 35.0 - 47.0 %    MCV 95 78 - 100 fL    MCH 32.4 26.5 - 33.0 pg    MCHC 34.1 31.5 - 36.5 g/dL    RDW 14.0 10.0 - 15.0 %    Platelet Count 351 150 - 450 10e3/uL       Total time spent for face to face visit, reviewing labs/imaging studies, counseling and  coordination of care was: Over 80 min More than 50% of this time was spent on counseling and coordination of care.    Counseling patient.  Reviewing chart.  Multiple issues reviewed.    Refugio Bruner MD  Neurologist  [facility]  Tel:- [phone]

## 2025-02-19 NOTE — PROGRESS NOTES
[facility]    Neurosurgery  Daily Note    Assessment & Plan   Marlene Merida is a 64 year old female past medical history parkinson's presents to [facility] ED today for evaluation of a fall and admitted 2/18/25. NSGY consulted for cervical stenosis. Per chart review, patient was hospitalized 2/13/25 after a fall, was discharged home, patient lives alone.    Reports bilateral upper and lower extremity pain and paresthesia. Baseline tremors and shuffling giat due to Parkinson's. Notes that her legs have becomes much weaker recently as she has had 3 fall in 12 days, and never fell before then.    Denies acute bowel or bladder dysfunction, saddle anesthesia.     Patient follows with Dr. Bridger Niño, neurology, for advanced Parkinson's, currently on Sinemet.     On exam, patient is tremulous and has generalized upper and lower extremity weakness with most profound weakness in bilateral hip flexion, knee flexion and knee extension maneuvers.        EXAM: MR CERVICAL SPINE W/O CONTRAST  LOCATION: [facility]  DATE: 2/18/2025     INDICATION: cervical stensosis  COMPARISON: Cervical spine CT: 2/18/2025.  TECHNIQUE: MRI Cervical Spine without IV contrast.                                                                  IMPRESSION:     1.  Extensive multilevel cervical spondylosis as detailed above.  2.  Spinal canal stenosis is greatest and advanced at C3-C4, C4-C5, and C5-C6. There is also moderate spinal canal stenosis at C6-C7.  3.  Neural foraminal stenosis is greatest and advanced on the left at C3-C4 and C5-C6 as well as the right at C4-C5. Less severe degrees of neural foraminal narrowing as above.  4.  No definite cord signal abnormality.  5.  Mild periarticular edema associated with the left facet joint at C3-C4 is most likely degenerative in etiology, though warrants correlation against any clinical evidence of infection given a small associated facet joint  effusion (e.g. rule out septic arthritis).      Plan:  -Neurology consulted to rule out Parkinson's as cause of falls  -Neurosurgery will continue to follow  -No neurosurgical intervention warranted at this time      Phillip Lopes, DNP, APRN, CNP  [facility]  Tel [phone]      Interval History   Stable.  Doing well.  Improving slowly.  Pain is reasonably controlled.  No fevers.     Physical Exam   Temp: 98  F (36.7  C) Temp src: Oral BP: 137/78 Pulse: 85   Resp: 20 SpO2: 97 % O2 Device: None (Room air)    Vitals:    02/18/25 1519   Weight: 115 lb (52.2 kg)     Vital Signs with Ranges  Temp:  [97.5  F (36.4  C)-98  F (36.7  C)] 98  F (36.7  C)  Pulse:  [79-90] 85  Resp:  [16-20] 20  BP: (115-156)/(63-95) 137/78  SpO2:  [95 %-98 %] 97 %  I/O last 3 completed shifts:  In: -   Out: 700 [Urine:700]      NEUROLOGICAL EXAMINATION:   Mental status: alert and oriented x3 speech clear and appropriate   Cranial nerves: II-XII intact  Motor strength:   Biceps: 5/5 right, 5/5 left   Wrist extensors:5/5 right, 5/5 left   Triceps: 5/5 right, 5/5 left   Deltoids: 5/5 right, 5/5 left   Finger flexion: 5/5 right, 5/5 left   Finger abduction:5/5 right, 5/5 left   Hand : 5/5 right, 5/5 left     Hip flexors: 3/5 right, 3/5 left   Quadriceps: 3/5 right, 3/5 left  Ankle dorsiflexion: 5/5 right, 5/5 left    Ankle plantar flexion:5/5 right, 5/5 left   Extensor hallicus longus: 5/5 right, 5/5 left   Sensation:  intact  Reflexes:  Negative Atkins's, Negative Babinski.  Negative Clonus.    Coordination:  Smooth finger to nose testing.   Negative pronator drift.    Gait:  not tested    CBC RESULTS:   Recent Labs   Lab Test 02/19/25  0808   WBC 7.8   RBC 4.50   HGB 14.6   HCT 42.8   MCV 95   MCH 32.4   MCHC 34.1   RDW 14.0        Basic Metabolic Panel:  Lab Results   Component Value Date     02/19/2025      Lab Results   Component Value Date    POTASSIUM 4.2 02/19/2025    POTASSIUM 4.0 08/08/2022  "    Lab Results   Component Value Date    CHLORIDE 104 02/19/2025    CHLORIDE 108 08/08/2022     Lab Results   Component Value Date    TITO 9.4 02/19/2025     Lab Results   Component Value Date    CO2 27 02/19/2025    CO2 26 08/08/2022     Lab Results   Component Value Date    BUN 19.3 02/19/2025    BUN 21 08/08/2022     Lab Results   Component Value Date    CR 0.53 02/19/2025     Lab Results   Component Value Date    GLC 91 02/19/2025    GLC 98 08/08/2022     INR:  No results found for: \"INR\"        Medications   Current Facility-Administered Medications   Medication Dose Route Frequency Provider Last Rate Last Admin      Current Facility-Administered Medications   Medication Dose Route Frequency Provider Last Rate Last Admin    aspirin EC tablet 81 mg  81 mg Oral BID Kathy Og PA-C   81 mg at 02/19/25 0757    carbidopa-levodopa (RYTARY) 48. MG per CR capsule 1 capsule  1 capsule Oral TID Kathy Og PA-C   1 capsule at 02/19/25 0759    carbidopa-levodopa (SINEMET)  MG per tablet 1 tablet  1 tablet Oral TID Kathy Og PA-C   1 tablet at 02/19/25 0257    Lidocaine (LIDOCARE) 4 % Patch 1 patch  1 patch Transdermal Q24H Kathy Og PA-C   1 patch at 02/18/25 1809    traZODone (DESYREL) tablet 100 mg  100 mg Oral At Bedtime Kathy Og PA-C   100 mg at 02/18/25 2117            "

## 2025-02-19 NOTE — PLAN OF CARE
Goal Outcome Evaluation:       discharge from hospital into a TCU then find an assisted living facliity to to move to.

## 2025-02-19 NOTE — PHARMACY-ADMISSION MEDICATION HISTORY
Admission medication history completed at [facility]. Please see Pharmacist Admission Medication History note from 2/18/2025.

## 2025-02-19 NOTE — CARE PLAN
"PRIMARY DIAGNOSIS: \"GENERIC\" NURSING  OUTPATIENT/OBSERVATION GOALS TO BE MET BEFORE DISCHARGE:  ADLs back to baseline: No    Activity and level of assistance: not OOB    Pain status: Improved-controlled with oral pain medications.    Return to near baseline physical activity: No     Discharge Planner Nurse   Safe discharge environment identified: No  Barriers to discharge: Yes       Entered by: Kristal Guevara RN 02/19/2025 6:05 AM     Patient is alert and oriented X 4, PRN oxycodone was given X 1 this shift.  Regular diet ate 100% of sandwich, purewick in place with 700 ml of urine output. Make needs known. Care plan ongoing.    Kristal OLIVEIRA RN.  "

## 2025-02-19 NOTE — PLAN OF CARE
PRIMARY DIAGNOSIS: GENERALIZED WEAKNESS    OUTPATIENT/OBSERVATION GOALS TO BE MET BEFORE DISCHARGE  1. Orthostatic performed: No    2. Tolerating PO medications: Yes    3. Return to near baseline physical activity: No    4. Cleared for discharge by consultants (if involved): No    Discharge Planner Nurse   Safe discharge environment identified: No  Barriers to discharge: Yes       Entered by: Niranjan Chinchilla RN 02/18/2025 7:12 PM     Please review provider order for any additional goals.   Nurse to notify provider when observation goals have been met and patient is ready for discharge.Goal Outcome Evaluation:

## 2025-02-19 NOTE — PROGRESS NOTES
"Care Management Follow Up    Length of Stay (days): 0    Expected Discharge Date: 02/20/2025    Anticipated Discharge Plan:   Transitional care (TCU) is recommended for continued therapy and skilled nursing.    Transportation: TBD    PT Recommendations: Transitional Care Facility  OT Recommendations:        Barriers to Discharge: medical stability    Prior Living Situation: house (within a senior living community) with alone    Discussed  Partnership in Safe Discharge Planning  document with patient/family: No     Handoff Completed: No, handoff not indicated or clinically appropriate    Patient/Spokesperson Updated: No    Additional Information:  On 2/18/2025, writer met with patient to assist with discharge planning (See PRAFUL PATE RN CM notes as well).  Patient lives alone and has a history of Parkinsons. She was discharged to home from [facility] and did not have home care services. She agreed that she should 'go to TCU' but seemed to think she should be able to stay indefinitely. Writer explained that insurance only covers TCU while patients are making progress toward a goal of discharge to home. Typically, TCU stays are 10-14 days with a goal of home. Patient indicated that she had not understood that but still would like to try for TCU. Discussed possibility of long term care and/or assisted living. Writer explained that obtaining funding for [facility] may take a long time but patient states her daughter Bibiana has already started the application for EW. Patient stated, \"I told her to throw that in the trash\" but writer educated patient that the EW is very important and, as patient will likely need it eventually, they should proceed with the application. Writer did speak with her daughter, Bibiana, who has the EW application and is continuing to work on it for [facility]. Bibiana agreed with SNF placement until T[facility] is found.    Transitional care (TCU) is recommended for continued therapy and skilled nursing. " Patient agreed to have referrals made to the [place] area. Referrals are pending.      Contacts: Daughter Bibiana at [phone]    Next Steps:  CM will continue to monitor progression of care, review team recommendations and provide discharge planning assist as needed.      Yuli Jerome RN

## 2025-02-19 NOTE — PROGRESS NOTES
"   02/19/25 1505   Appointment Info   Signing Clinician's Name / Credentials (OT) Tomeka Velma, OTR/L   Quick Adds   Quick Adds [facility] Auth & Certification   Living Environment   People in Home alone   Current Living Arrangements apartment  (senior living independent apartment)   Home Accessibility no concerns   Self-Care   Equipment Currently Used at Home walker, rolling  (4WW)   Fall history within last six months yes   Number of times patient has fallen within last six months 3   Activity/Exercise/Self-Care Comment Independent ADLs.   Instrumental Activities of Daily Living (IADL)   IADL Comments Independent with most IADLs, does not drive, children assist with appointments/errands.   General Information   Onset of Illness/Injury or Date of Surgery 02/18/25   Referring Physician Kathy Og PA-C   Patient/Family Therapy Goal Statement (OT) to get stronger   Additional Occupational Profile Info/Pertinent History of Current Problem Per chart review, pt \"is a 64 year old female with PMHx of advanced Parkinson's, sciatica who was admitted on 2/18/2025. Recently hospitalized 1/25 - 1/30/25 for low back pain, she was seen by neurology for her Parkinson's , was added on immediate release Sinemet and discharged to TCU.      She was discharged to home from TCU on 2/7/25 but had a fall this morning and presented to the ER for evaluation.  CT C-spine showed severe spinal canal stenosis, admitted to observation for PT/OT/CM, neurosurgery and neurology consultation.\"   Existing Precautions/Restrictions fall   Limitations/Impairments safety/cognitive   Cognitive Status Examination   Orientation Status orientation to person, place and time  (A&Ox4)   Cognitive Status Comments reports she has been attempting to get out of bed independently   Pain Assessment   Patient Currently in Pain Yes, see Vital Sign flowsheet  (back pain)   Range of Motion Comprehensive   Comment, General Range of Motion limited shoulder ROM, increased tone " limiting AROM all extremities   Strength Comprehensive (MMT)   Comment, General Manual Muscle Testing (MMT) Assessment Significant weakness noted functionally.   Muscle Tone Assessment   Muscle Tone Comments increased tone   Coordination   Coordination Comments Impaired gross and fine motor coordination bilaterally.   Bed Mobility   Bed Mobility supine-sit;sit-supine   Supine-Sit [place] (Bed Mobility) maximum assist (25% patient effort)   Sit-Supine [place] (Bed Mobility) maximum assist (25% patient effort);2 person assist   Assistive Device (Bed Mobility) bed rails;draw sheet   Transfers   Transfers sit-stand transfer;bed-chair transfer;toilet transfer   Transfer Skill: Bed to Chair/Chair to Bed   Transfer Comments Pt unable to transfer EOB>recliner d/t weakness.   Sit-Stand Transfer   Sit-Stand [place] (Transfers) moderate assist (50% patient effort);2 person assist   Sit/Stand Transfer Comments arm-in-arm   Toilet Transfer   [place] Level (Toilet Transfer) not tested   Toilet Transfer Comments Per clinical reasoning, pt may require mechanical lift for close pivot transfers to [facility].   Balance   Balance Comments SBA unsupported sitting balance at EOB, Mod Ax2 standing balance with HHA   Activities of Daily Living   BADL Assessment/Intervention lower body dressing;upper body dressing;grooming   Upper Body Dressing Assessment/Training   Comment, (Upper Body Dressing) Per clinical reasoning, will be impacted by bilateral UE deficits.   [place] Level (Upper Body Dressing) not tested   Lower Body Dressing Assessment/Training   Position (Lower Body Dressing) supine   [place] Level (Lower Body Dressing) dependent (less than 25% patient effort)   Grooming Assessment/Training   [place] Level (Grooming) not tested   Comment, (Grooming) Per clinical reasoning, will be impacted by bilateral UE deficits.   Clinical Impression   Criteria for Skilled Therapeutic Interventions Met (OT) Yes,  treatment indicated   OT Diagnosis Impaired ability to perform ADLs, IADLs, and functional mobility.   Influenced by the following impairments Parkinson disease   OT Problem List-Impairments impacting ADL problems related to;activity tolerance impaired;balance;cognition;coordination;fear & anxiety;mobility;motor control;muscle tone;range of motion (ROM);pain;postural control   Assessment of Occupational Performance 3-5 Performance Deficits   Identified Performance Deficits toileting, grooming/hygiene, lower body dressing, upper body dressing   Planned Therapy Interventions (OT) ADL retraining;IADL retraining;balance training;bed mobility training;cognition;motor coordination training;neuromuscular re-education;ROM;strengthening;stretching;transfer training;home program guidelines;progressive activity/exercise;risk factor education   Clinical Decision Making Complexity (OT) detailed assessment/moderate complexity   Risk & Benefits of therapy have been explained evaluation/treatment results reviewed;care plan/treatment goals reviewed;risks/benefits reviewed;current/potential barriers reviewed;participants voiced agreement with care plan;participants included;patient   OT Total Evaluation Time   OT Eval, Moderate Complexity Minutes (35350) 12   Therapy Certification   Medical Diagnosis severe spinal canal stenosis   Start of Care Date 02/19/25   Certification date from 02/19/25   Certification date to 02/26/25   [facility] Authorization Information   Condition type Initial onset (within last 3 months)   Cause of current episode Unspecified   Nature of treatment Rehabilitative   Functional ability Severe functional limitations   Documented POC (choose all that apply) Measurable short and long term/discharge treatment goals related to physical and functional deficits.;Frequency of treatment visits and treatment activities to address deficit areas.;Patient agrees to program participation including home program   Briefly describe  symptoms increased muscle tone, back pain, significant generalized weakness   How did the symptoms start spinal canal stenosis   Last 24H: avg pain/symptom intensity  6/10   Past wk: avg pain/symptom intensity 3/10   Frequency of Symptoms Constantly (% of the time)   Symptom impact on ADLs Extremely   Condition change since eval N/A (first visit)   General health reported by patient Poor   OT Goals   Therapy Frequency (OT) 5 times/week   OT Predicted Duration/Target Date for Goal Attainment 02/26/25   OT Goals Hygiene/Grooming;Upper Body Dressing;Lower Body Dressing;Toilet Transfer/Toileting   OT: Hygiene/Grooming supervision/stand-by assist  (seated EOB)   OT: Upper Body Dressing Supervision/stand-by assist  (seated EOB)   OT: Lower Body Dressing Minimal assist  (seated EOB)   OT: Toilet Transfer/Toileting Minimal assist;toilet transfer;cleaning and garment management;using adaptive equipment  (Ax2 pivot transfer)   Self-Care/Home Management   Self-Care/Home Mgmt/ADL, Compensatory, Meal Prep Minutes (47201) 9   Symptoms Noted During/After Treatment (Meal Preparation/Planning Training) fatigue;increased pain   Treatment Detail/Skilled Intervention Pt performed 2x additional STS from EOB with Mod-Max Ax2 using arm-in-arm tech. Pt very anxious and afraid of falling. Pt required cueing and Mod A for feet placement and tech, noted to demo posterior leaning in standing. Pt declined further trials d/t pain and fatiuge. Pt left in bed at end of session with bed alarm on and call light in reach, reminded pt to call for assistance before getting up - pt verbalized understanding.   OT Discharge Planning   OT Plan Ax2 STS and pivot transfers as able, EOB seated G/H, BUE AROM ex   OT Discharge Recommendation (DC Rec) Transitional Care Facility   OT Rationale for DC Rec Pt currently requiring Ax2 for STS and Ax1-2 for ADLs, unable to pivot during therapy session. Pt very motivated to progress.   OT Brief overview of  current status Mod-Max Ax2   OT Total Distance Amb During Session (feet) 0   Total Session Time   Timed Code Treatment Minutes 9   Total Session Time (sum of timed and untimed services) 21     M [facility]                                                                                   OUTPATIENT OCCUPATIONAL THERAPY    PLAN OF TREATMENT FOR OUTPATIENT REHABILITATION   Patient's Last Name, First Name, Marlene Orourke YOB: 1960   Provider's Name   [facility]   Medical Record No.  [id number]     Onset Date: 02/18/25 Start of Care Date: 02/19/25     Medical Diagnosis:  severe spinal canal stenosis               OT Diagnosis:  Impaired ability to perform ADLs, IADLs, and functional mobility. Certification Dates:  From: 02/19/25  To: 02/26/25     See note for plan of treatment, functional goals, and certification details.    I CERTIFY THE NEED FOR THESE SERVICES FURNISHED UNDER        THIS PLAN OF TREATMENT AND WHILE UNDER MY CARE (Physician co-signature of this document indicates review and certification of the therapy plan).

## 2025-02-19 NOTE — ED NOTES
[facility] ED Handoff Report    ED Chief Complaint: Weakness    ED Diagnosis:  No diagnosis found.     PMH:    Past Medical History:   Diagnosis Date    Parkinson's disease (H) 2019        Code Status:  Full Code     Falls Risk: Yes Band: Applied    Current Living Situation/Residence: lives alone     Elimination Status: Continent: Yes     Activity Level: Independent with walker    Patients Preferred Language:  English     Needed: No    Vital Signs:  BP (!) 149/76 (BP Location: Left arm, Patient Position: Semi-[place]omino's)   Pulse 82   Temp 98  F (36.7  C) (Oral)   Resp 18   Wt 52.2 kg (115 lb)   LMP  (LMP Unknown)   SpO2 96%   BMI 18.56 kg/m       Cardiac Rhythm: NSR    Pain Score: 3/10    Is the Patient Confused:  No    Last Food or Drink: 02/19/25 at 1200    Focused Assessment:  Generalized    Tests Performed: Done: Labs, imaging    Treatments Provided:  See MAR    Family Dynamics/Concerns: No    Family Updated On Visitor Policy: Yes    Plan of Care Communicated to Family: Yes    Who Was Updated about Plan of Care: Patient    Belongings Checklist Done and Signed by Patient: Yes    Medications sent with patient: EVY    Covid: asymptomatic , NA    Additional Information: EVY Parikh, RN 2/19/2025 1:39 PM

## 2025-02-19 NOTE — PROGRESS NOTES
02/19/25 0930   Appointment Info   Signing Clinician's Name / Credentials (PT) Agatha Mcbride,PT   Quick Adds   Quick Adds [facility] Auth & Certification   Living Environment   People in Home alone   Current Living Arrangements apartment  (Independent senior)   Home Accessibility no concerns  (elevator)   Self-Care   Equipment Currently Used at Home other (see comments);grab bar, toilet;grab bar, tub/shower  (4WW)   Fall history within last six months yes   Number of times patient has fallen within last six months 3   Activity/Exercise/Self-Care Comment I ADLS/IADLS, son -groceries , daughter medical appts/meds   General Information   Onset of Illness/Injury or Date of Surgery 02/18/25   Referring Physician Dr. John Vazquez   Patient/Family Therapy Goals Statement (PT) get stronger   Pertinent History of Current Problem (include personal factors and/or comorbidities that impact the POC) Marlene Merida is a 64 year old female with PMHx of Parkinson's, sciatica who was admitted on 2/18/2025. Recently hospitalized 1/25 -1/30/25 for low back pain, she was seen by neurology for her Parkinson's , was added on immediate release Sinemet and discharged to TCU.      Patient states she was discharged from the TCU on 2/7/2025.  She states she was doing more or less okay at home until she had a fall this morning.  States she woke up feeling significantly weak, swung her legs off the edge of the bed but fell on to her carpeted floor, first onto her knees and then on to her head.  Denies any loss of consciousness.  Phone was nearby and she was able to call for help.  She states her Parkinson's symptoms fluctuate and is struggling with cares at home and is hopeful for discharge to a rehab facility.     She endorses chronic generalized pain mostly to her lower and upper extremities.  Endorses numbness to the base of her neck with bilateral upper extremity pain, stiffness and paresthesias.  Does have known history of sciatica that she  also states fluctuates.   General Observations pt in ED bed agreeable to PT   Cognition   Affect/Mental Status (Cognition) unable/difficult to assess   Follows Commands (Cognition) follows one-step commands   Pain Assessment   Patient Currently in Pain Yes, see Vital Sign flowsheet  (9/10 from thigh to ankle R> L)   Range of Motion (ROM)   ROM Comment BLE limited due to rigidity   Strength (Manual Muscle Testing)   Strength Comments BLE limited, pt  unable to move BLE in bed independently   Bed Mobility   Bed Mobility Limitations decreased ability to use legs for bridging/pushing;decreased ability to use arms for pushing/pulling   Impairments Contributing to Impaired Bed Mobility impaired balance;abnormal muscle tone;pain;decreased strength   Assistive Device (Bed Mobility) draw sheet;bed rails;other (see comments)  (HOB elevated)   Comment, (Bed Mobility) maxAx2 , pt feeling dizzy sitting up   Transfers   Transfer Safety Concerns Noted decreased weight-shifting ability   Impairments Contributing to Impaired Transfers impaired balance;pain;decreased strength   Comment, (Transfers) sit<>stand modAx2 B knees blocked   Gait/Stairs (Locomotion)   [place] Level (Gait) moderate assist (50% patient effort);2 person assist   Assistive Device (Gait) other (see comments)  (arm in arm)   Distance in Feet (Gait) stood x20sec   Comment, (Gait/Stairs) unable to ambulate due to dizziness and weakness   Balance   Balance Comments unsteady   Clinical Impression   Criteria for Skilled Therapeutic Intervention Yes, treatment indicated   PT Diagnosis (PT) decreased functional mobility   Influenced by the following impairments pain, dizziness, decreased strength, ROM and endurence   Functional limitations due to impairments bed mob, transfers . gait   Clinical Presentation (PT Evaluation Complexity) evolving   Clinical Presentation Rationale presents as medically diagnosed   Clinical Decision Making (Complexity) moderate complexity    Planned Therapy Interventions (PT) bed mobility training;gait training;strengthening;transfer training;progressive activity/exercise   Risk & Benefits of therapy have been explained evaluation/treatment results reviewed;patient   PT Total Evaluation Time   PT Eval, Moderate Complexity Minutes (93690) 13   Therapy Certification   Start of care date 02/19/25   Certification date from 02/19/25   Certification date to 02/26/25   Medical Diagnosis Parkinsons disease, fall   [facility] Authorization Information   Condition type Recurrent (multiple episodes of < 3 months)   Cause of current episode Unspecified   Nature of treatment Rehabilitative   Functional ability Severe functional limitations   Documented POC (choose all that apply) Measurable short and long term/discharge treatment goals related to physical and functional deficits.;Frequency of treatment visits and treatment activities to address deficit areas.   Briefly describe symptoms pain, dizziness, rigidity, weakness   How did the symptoms start fall   Last 24H: avg pain/symptom intensity  9/10   Past wk: avg pain/symptom intensity 5/10   Frequency of Symptoms Constantly (% of the time)   Symptom impact on ADLs Quite a bit   Condition change since eval No change   General health reported by patient Good   Physical Therapy Goals   PT Frequency 6x/week   PT Predicted Duration/Target Date for Goal Attainment 02/26/25   PT: Bed Mobility Minimal assist;Supine to/from sit   PT: Transfers Moderate assist;Sit to/from stand;Bed to/from chair;Assistive device   PT: Gait Moderate assist;Rolling walker;50 feet   Therapeutic Activity   Therapeutic Activities: dynamic activities to improve functional performance Minutes (25738) 10   Symptoms Noted During/After Treatment Increased pain;Dizziness   Treatment Detail/Skilled Intervention pt sat EOB x3min initial sitting balance modAx1, with time and A pt able to sit EOB SBA, additional sit<>stand mod Ax2 BL knee blocked pt able  to stand x20 sec arm in arm - pt reporting dizziness with activity sit>supine maxAx2. supine scooting maxAx2   PT Discharge Planning   PT Plan ROM/strengthening ex, bed mob, progress transfers bring FWW  when able trial gait- time with Parkinsons meds   PT Discharge Recommendation (DC Rec) Transitional Care Facility   PT Rationale for DC Rec pt needing Ax2 for limited mobility, pt unable to care for self at this time recommned further PT   PT Brief overview of current status bed mob mod/maxAx2. sit<.stand mod Ax2 arm in arm with B Knees blocked   PT Total Distance Amb During Session (feet) 0   PT Equipment Needed at Discharge other (see comments);wheelchair  (pt has 4WW at home)   [facility]                                                                                   OUTPATIENT PHYSICAL THERAPY    PLAN OF TREATMENT FOR OUTPATIENT REHABILITATION   Patient's Last Name, First Name, Marlene Orourke YOB: 1960   Provider's Name   [facility]   Medical Record No.  [id number]     Onset Date: 02/18/25 Start of Care Date: 02/19/25     Medical Diagnosis:  Parkinsons disease, fall               PT Diagnosis:  decreased functional mobility Certification Dates:  From: 02/19/25  To: 02/26/25       See note for plan of treatment, functional goals, and certification details.    I CERTIFY THE NEED FOR THESE SERVICES FURNISHED UNDER        THIS PLAN OF TREATMENT AND WHILE UNDER MY CARE (Physician co-signature of this document indicates review and certification of the therapy plan).

## 2025-02-19 NOTE — SUMMARY OF CARE
Patient admitted to room 11 at approximately 13:58PM via cart from emergency room.    Patient ambulated/transferred:  with one assist. air mimi.    Detailed List of Belongings (be very specific listing out each item):     Purse, robe, pants, phone,glasses

## 2025-02-19 NOTE — PROGRESS NOTES
"PRIMARY DIAGNOSIS: \"GENERIC\" NURSING  OUTPATIENT/OBSERVATION GOALS TO BE MET BEFORE DISCHARGE:  ADLs back to baseline: No    Activity and level of assistance: Not OOB    Pain status: patient is very anxious and asks to be repositioned multiple times    Return to near baseline physical activity: No     Discharge Planner Nurse   Safe discharge environment identified: No  Barriers to discharge: Yes       Entered by: Reba Harkins RN 02/18/2025 10:45 PM     Please review provider order for any additional goals.   Nurse to notify provider when observation goals have been met and patient is ready for discharge.  "

## 2025-02-19 NOTE — PROGRESS NOTES
[facility]    Medicine Progress Note - Hospitalist Service    Date of Admission:  2/18/2025    Assessment & Plan    Marlene Merida is a 64 year old female with PMHx of advanced Parkinson's, sciatica who was admitted on 2/18/2025. Recently hospitalized 1/25 - 1/30/25 for low back pain, she was seen by neurology for her Parkinson's , was added on immediate release Sinemet and discharged to TCU.      She was discharged to home from TCU on 2/7/25 but had a fall this morning and presented to the ER for evaluation.  CT C-spine showed severe spinal canal stenosis, admitted to observation for PT/OT/CM, neurosurgery and neurology consultation.    Fall at Home     Physical Deconditioning   Bilateral paraparesis  -- CT head today for acute findings  -- UA negative   -- CBC and BMP unremarkable   Patient has bilateral paraparesis but with intact reflexes of the knee and ankle.  She has no sensory deficits.  Babinski's downgoing.  Do not think patient has GBS or acute spinal injury.  Suspect myopathy which could be related to Parkinson's.  Neurology input pending.  Lumbar spine MRI done on 1/25 showed multilevel degeneration with no spinal canal stenosis.  Low suspicion for cauda equina syndrome given absence of urinary symptoms.  Neurosurgery     Spinal Canal Stenosis   Endorses numbness to the base of her neck, with bilateral upper extremity pain/paresthesias/weakness  -- CT C spine showing severe spinal canal narrowing at C4-C5 and C5-C6. Moderate spinal canal narrowing at C3-C4 and C6-C7. Severe left and moderate right neuroforaminal narrowing at C3-C4. Moderate bilateral neuroforaminal narrowing at C4-C5. Severe left and mild to moderate right neuroforaminal narrowing at C5-C6  Cervical MRI reviewed  No surgical intervention as per neurosurgery  -- Pain control with Tylenol, PRN oxycodone, lido patch.  Will add gabapentin for pain control         Advanced Parkinson's Disease   Chronic pain  No  evidence of multisystem atrophy, cortical basilar degeneration or Lewy body dementia  -- Continue PTA Rytary and Sinemet . Of note, patient had been taking Rytary and Sinemet at the same times throughout the day. Will adjust timing to alternate per neurology note at recent admission   -- Continue PTA trazadone nightly   -- Neurology consulted  -- Pain control as above  -- PT/OT/CM    History of sciatica  -- Patient complaining of right buttock pain blood pressure radiating to the ipsilateral feet  --Ordered gabapentin 200 mg 3 times daily     Observation Goals: -diagnostic tests and consults completed and resulted, -vital signs normal or at patient baseline, -tolerating oral intake to maintain hydration, -adequate pain control on oral analgesics, -returns to baseline functional status, -safe disposition plan has been identified, Nurse to notify provider when observation goals have been met and patient is ready for discharge.  Diet: Regular Diet Adult    DVT Prophylaxis: Enoxaparin (Lovenox) SQ  Gilliam Catheter: Not present  Lines: None     Cardiac Monitoring: None  Code Status: Full Code      Clinically Significant Risk Factors Present on Admission                 # Drug Induced Platelet Defect: home medication list includes an antiplatelet medication                 # Financial/Environmental Concerns: none         Social Drivers of Health    Food Insecurity: High Risk (1/27/2025)    Food Insecurity     Within the past 12 months, did you worry that your food would run out before you got money to buy more?: Yes     Within the past 12 months, did the food you bought just not last and you didn t have money to get more?: Yes   Housing Stability: High Risk (1/27/2025)    Housing Stability     Do you have housing? : Yes     Are you worried about losing your housing?: Yes   Financial Resource Strain: High Risk (1/27/2025)    Financial Resource Strain     Within the past 12 months, have you or your family members you live  with been unable to get utilities (heat, electricity) when it was really needed?: Yes   Transportation Needs: High Risk (1/27/2025)    Transportation Needs     Within the past 12 months, has lack of transportation kept you from medical appointments, getting your medicines, non-medical meetings or appointments, work, or from getting things that you need?: Yes   Physical Activity: Inactive (1/13/2025)    Exercise Vital Sign     Days of Exercise per Week: 0 days     Minutes of Exercise per Session: 0 min   Stress: Stress Concern Present (1/13/2025)    [employer] - Occupational Stress Questionnaire     Feeling of Stress : To some extent   Social Connections: Moderately Isolated (1/13/2025)    Social Connection and Isolation Panel [NHANES]     Frequency of Communication with Friends and Family: Three times a week     Frequency of Social Gatherings with Friends and Family: Once a week     Attends Protestant Services: 1 to 4 times per year     Active Member of Clubs or Organizations: No     Attends Club or Organization Meetings: Never     Marital Status:           Disposition Plan     Medically Ready for Discharge: Anticipated Tomorrow             oJhn Vazquez MD  Hospitalist Service  [facility]  Securely message with [employer] (more info)  Text page via [facility] Paging/Directory   ______________________________________________________________________    Interval History   Patient new to me.  Chart reviewed.  Patient was discharged from TCU on 2/7 but had fall on 2/18 at home.  Currently evaluation under process.  MRI of the C-spine shows multi level degenerative changes.  Patient denies any urinary retention and bowel incontinence.  She is able to feel her bladder.  Patient denies any diplopia, and orthostatic hypotension.      Physical Exam   Vital Signs: Temp: 98.7  F (37.1  C) Temp src: Oral BP: 104/51 Pulse: 82   Resp: 18 SpO2: 97 % O2 Device: None (Room air)     Weight: 113 lbs 8.59 oz  Malnourished  In no apparent distress  No diplopia on upward gaze  Patient able to remember what she ate for breakfast  Bilateral shoulder abduction is restricted due to prior shoulder joint problems  Pill-rolling tremor noted on right upper extremity and left lower extremity  Cogwheel rigidity noted in the right upper extremity  Bilateral brachialis and brachioradialis reflexes are unremarkable  Bilateral knee and ankle reflexes are unremarkable  Power is 2 out of 5 in the flexors of the hip and knee  Babinski's is downgoing  Bilateral touch sensation are intact in the upper and lower extremity.  Joint position sense is also intact in the upper and lower extremity.  Vibration and pinprick sensation were not tested        Medical Decision Making       55 MINUTES SPENT BY ME on the date of service doing chart review, history, exam, documentation & further activities per the note.  MANAGEMENT DISCUSSED with the following over the past 24 hours: Patient       Data

## 2025-02-19 NOTE — CONSULTS
Care Management Initial Consult    General Information  Assessment completed with: Marlene Story  Type of CM/SW Visit: Initial Assessment    Primary Care Provider verified and updated as needed:     Readmission within the last 30 days: lack of support, previous discharge plan unsuccessful   Return Category: Exacerbation of disease  Reason for Consult: discharge planning  Advance Care Planning: Advance Care Planning Reviewed: no concerns identified ---- tells me she's made a ACD but it's not currently in the chart. Encouraged her to bring into her clinic to scan in or here to the hospital to scan in.       Communication Assessment  Patient's communication style: spoken language (English or Bilingual)             Cognitive  Cognitive/Neuro/Behavioral: WDL                      Living Environment:   People in home: alone     Current living Arrangements: house (within the [facility])      Able to return to prior arrangements: no  Living Arrangement Comments: she is unable to walk and therefore, unable to return back to her home right now    Family/Social Support:  Care provided by: self  Provides care for: no one, unable/limited ability to care for self  Marital Status:   Support system: Children          Description of Support System: Supportive    Support Assessment: Lacks necessary supervision and assistance, Lacks adequate physical care    Current Resources:   Patient receiving home care services: No        Community Resources: None  Equipment currently used at home: walker, rolling, grab bar, toilet, grab bar, tub/shower  Supplies currently used at home: None    Employment/Financial:  Employment Status: retired     Employment/ Comments: no  background  Financial Concerns: none           Does the patient's insurance plan have a 3 day qualifying hospital stay waiver?  Yes     Which insurance plan 3 day waiver is available? Alternative insurance waiver    Will the  waiver be used for post-acute placement? Undetermined at this time    Lifestyle & Psychosocial Needs:  Social Drivers of Health     Food Insecurity: High Risk (1/27/2025)    Food Insecurity     Within the past 12 months, did you worry that your food would run out before you got money to buy more?: Yes     Within the past 12 months, did the food you bought just not last and you didn t have money to get more?: Yes   Depression: Not at risk (1/8/2025)    PHQ-2     PHQ-2 Score: 0   Housing Stability: High Risk (1/27/2025)    Housing Stability     Do you have housing? : Yes     Are you worried about losing your housing?: Yes   Tobacco Use: Low Risk  (1/8/2025)    Patient History     Smoking Tobacco Use: Never     Smokeless Tobacco Use: Never     Passive Exposure: Never   Financial Resource Strain: High Risk (1/27/2025)    Financial Resource Strain     Within the past 12 months, have you or your family members you live with been unable to get utilities (heat, electricity) when it was really needed?: Yes   Alcohol Use: Not At Risk (1/13/2025)    AUDIT-C     Frequency of Alcohol Consumption: Monthly or less     Average Number of Drinks: 1 or 2     Frequency of Binge Drinking: Never   Transportation Needs: High Risk (1/27/2025)    Transportation Needs     Within the past 12 months, has lack of transportation kept you from medical appointments, getting your medicines, non-medical meetings or appointments, work, or from getting things that you need?: Yes   Physical Activity: Inactive (1/13/2025)    Exercise Vital Sign     Days of Exercise per Week: 0 days     Minutes of Exercise per Session: 0 min   Interpersonal Safety: Not At Risk (2/12/2025)    Received from [facility]    Humiliation, Afraid, Rape, and Kick questionnaire     Fear of Current or Ex-Partner: No     Emotionally Abused: No     Physically Abused: No     Sexually Abused: No   Stress: Stress Concern Present (1/13/2025)    [employer] [employer]  "Health - Occupational Stress Questionnaire     Feeling of Stress : To some extent   Social Connections: Moderately Isolated (1/13/2025)    Social Connection and Isolation Panel [NHANES]     Frequency of Communication with Friends and Family: Three times a week     Frequency of Social Gatherings with Friends and Family: Once a week     Attends Caodaism Services: 1 to 4 times per year     Active Member of Clubs or Organizations: No     Attends Club or Organization Meetings: Never     Marital Status:    Health Literacy: Not on file       Functional Status:  Prior to admission patient needed assistance:   Dependent ADLs:: Ambulation-walker  Dependent IADLs:: Shopping, Transportation  Assesssment of Functional Status: Not at baseline with ADL Functioning, Needs placement in a SNF/TCF for rehabilitation    Mental Health Status:  Mental Health Status: No Current Concerns       Chemical Dependency Status:  Chemical Dependency Status: No Current Concerns             Values/Beliefs:  Spiritual, Cultural Beliefs, Caodaism Practices, Values that affect care: no               Discussed  Partnership in Safe Discharge Planning  document with patient/family: No    Additional Information:  I met with Marlene when she presented in the ED back in late January. She discharged to a transitional care unit after that hospitalization and then was recently discharged back to her apartment within her senior living community with no services. She had a fall this morning and came back to the emergency room as she doesn't feel she can care for herself at home.     It was my hope that after a transitional care stay, Marlene and her family would be able to make arrangements for assisted living or long term care but Marlene tells me her daughter was working on all of those things \"but I told her to stop because I don't want to go into one of those places where you just sit around and die. My daughter wasn't very happy with me because I know she " "spent a lot of time with all of that.\"     Initially Marlene was going to be group home status but ultimately is here on an observation stay. PT and OT are consulted. Once we get their recommendations, we can carry on with discharge planning.     Marlene has a total of five children. Daughter Bibiana Smith is the most involved with her healthcare but she lives 2.5 hours away in [place], [place]. I have not yet spoken to Bibiana.         Next Steps: PT and OT's recommendations. Connect with daughter Bibiana about status of pursuing prison or LTC for patient.      Comfort Worthy RN  [facility] ED Care Manager  Desk Phone #: [phone]  Cell #: [phone]        "

## 2025-02-20 ENCOUNTER — TELEPHONE (OUTPATIENT)
Dept: FAMILY MEDICINE | Facility: CLINIC | Age: 65
End: 2025-02-20

## 2025-02-20 ENCOUNTER — PATIENT OUTREACH (OUTPATIENT)
Dept: CARE COORDINATION | Facility: CLINIC | Age: 65
End: 2025-02-20
Payer: COMMERCIAL

## 2025-02-20 ENCOUNTER — DOCUMENTATION ONLY (OUTPATIENT)
Dept: GERIATRICS | Facility: CLINIC | Age: 65
End: 2025-02-20
Payer: COMMERCIAL

## 2025-02-20 ENCOUNTER — APPOINTMENT (OUTPATIENT)
Dept: PHYSICAL THERAPY | Facility: HOSPITAL | Age: 65
End: 2025-02-20
Payer: COMMERCIAL

## 2025-02-20 ENCOUNTER — APPOINTMENT (OUTPATIENT)
Dept: RADIOLOGY | Facility: HOSPITAL | Age: 65
End: 2025-02-20
Payer: COMMERCIAL

## 2025-02-20 VITALS
BODY MASS INDEX: 18.25 KG/M2 | RESPIRATION RATE: 18 BRPM | WEIGHT: 113.54 LBS | TEMPERATURE: 98.5 F | OXYGEN SATURATION: 98 % | HEIGHT: 66 IN | SYSTOLIC BLOOD PRESSURE: 105 MMHG | DIASTOLIC BLOOD PRESSURE: 58 MMHG | HEART RATE: 73 BPM

## 2025-02-20 DIAGNOSIS — G20.A1 PARKINSON'S DISEASE, UNSPECIFIED WHETHER DYSKINESIA PRESENT, UNSPECIFIED WHETHER MANIFESTATIONS FLUCTUATE (H): Primary | ICD-10-CM

## 2025-02-20 PROBLEM — Z99.3 WHEELCHAIR DEPENDENCE: Status: ACTIVE | Noted: 2025-02-13

## 2025-02-20 PROCEDURE — G0378 HOSPITAL OBSERVATION PER HR: HCPCS

## 2025-02-20 PROCEDURE — 96372 THER/PROPH/DIAG INJ SC/IM: CPT | Performed by: INTERNAL MEDICINE

## 2025-02-20 PROCEDURE — 250N000013 HC RX MED GY IP 250 OP 250 PS 637: Performed by: PSYCHIATRY & NEUROLOGY

## 2025-02-20 PROCEDURE — 250N000013 HC RX MED GY IP 250 OP 250 PS 637: Performed by: INTERNAL MEDICINE

## 2025-02-20 PROCEDURE — 99239 HOSP IP/OBS DSCHRG MGMT >30: CPT | Performed by: EMERGENCY MEDICINE

## 2025-02-20 PROCEDURE — 250N000013 HC RX MED GY IP 250 OP 250 PS 637

## 2025-02-20 PROCEDURE — 97530 THERAPEUTIC ACTIVITIES: CPT | Mod: GP

## 2025-02-20 PROCEDURE — 99214 OFFICE O/P EST MOD 30 MIN: CPT | Performed by: PSYCHIATRY & NEUROLOGY

## 2025-02-20 PROCEDURE — 250N000011 HC RX IP 250 OP 636: Performed by: INTERNAL MEDICINE

## 2025-02-20 PROCEDURE — 97116 GAIT TRAINING THERAPY: CPT | Mod: GP

## 2025-02-20 PROCEDURE — 72050 X-RAY EXAM NECK SPINE 4/5VWS: CPT

## 2025-02-20 RX ORDER — GABAPENTIN 100 MG/1
200 CAPSULE ORAL 3 TIMES DAILY
Qty: 90 CAPSULE | Refills: 0 | Status: SHIPPED | OUTPATIENT
Start: 2025-02-20

## 2025-02-20 RX ORDER — BACLOFEN 5 MG/1
5 TABLET ORAL 3 TIMES DAILY PRN
Status: DISCONTINUED | OUTPATIENT
Start: 2025-02-20 | End: 2025-02-20 | Stop reason: HOSPADM

## 2025-02-20 RX ORDER — OXYCODONE HYDROCHLORIDE 5 MG/1
2.5 TABLET ORAL EVERY 4 HOURS PRN
Qty: 10 TABLET | Refills: 0 | Status: SHIPPED | OUTPATIENT
Start: 2025-02-20 | End: 2025-02-21

## 2025-02-20 RX ORDER — BACLOFEN 5 MG/1
5 TABLET ORAL 3 TIMES DAILY PRN
Qty: 20 TABLET | Refills: 0 | Status: SHIPPED | OUTPATIENT
Start: 2025-02-20

## 2025-02-20 RX ORDER — AMOXICILLIN 250 MG
1 CAPSULE ORAL 2 TIMES DAILY PRN
Qty: 20 TABLET | Refills: 0 | Status: SHIPPED | OUTPATIENT
Start: 2025-02-20

## 2025-02-20 RX ORDER — ACETAMINOPHEN 325 MG/1
650 TABLET ORAL EVERY 4 HOURS PRN
Qty: 30 TABLET | Refills: 1 | Status: SHIPPED | OUTPATIENT
Start: 2025-02-20

## 2025-02-20 RX ADMIN — LEVODOPA AND CARBIDOPA 1 CAPSULE: 195; 48.75 CAPSULE, EXTENDED RELEASE ORAL at 07:45

## 2025-02-20 RX ADMIN — ACETAMINOPHEN 650 MG: 325 TABLET ORAL at 06:22

## 2025-02-20 RX ADMIN — ASPIRIN 81 MG: 81 TABLET, COATED ORAL at 07:42

## 2025-02-20 RX ADMIN — GABAPENTIN 200 MG: 100 CAPSULE ORAL at 07:43

## 2025-02-20 RX ADMIN — ENOXAPARIN SODIUM 40 MG: 40 INJECTION SUBCUTANEOUS at 07:43

## 2025-02-20 RX ADMIN — CARBIDOPA AND LEVODOPA 1 TABLET: 25; 100 TABLET ORAL at 07:43

## 2025-02-20 ASSESSMENT — ACTIVITIES OF DAILY LIVING (ADL)
ADLS_ACUITY_SCORE: 52
ADLS_ACUITY_SCORE: 53
ADLS_ACUITY_SCORE: 53
ADLS_ACUITY_SCORE: 52
ADLS_ACUITY_SCORE: 52
ADLS_ACUITY_SCORE: 53
ADLS_ACUITY_SCORE: 52
ADLS_ACUITY_SCORE: 53

## 2025-02-20 NOTE — PROGRESS NOTES
Care Management Discharge Note    Discharge Date: 02/20/2025     Discharge Disposition: Transitional Care    Discharge Services: None    Discharge DME: None    Discharge Transportation:  [facility]     Private pay costs discussed: transportation costs    Does the patient's insurance plan have a 3 day qualifying hospital stay waiver?  Yes     Which insurance plan 3 day waiver is available? Alternative insurance waiver    Will the waiver be used for post-acute placement? Yes    PAS Confirmation Code: [id number]  Patient/family educated on Medicare website which has current facility and service quality ratings: yes    Education Provided on the Discharge Plan: Yes  Persons Notified of Discharge Plans: Patient; Patient's daughter; MD; nursing; admissions at [facility]  Patient/Family in Agreement with the Plan: yes    Handoff Referral Completed: No, handoff not indicated or clinically appropriate    Additional Information:  [facility] has accepted patient for admission today. A [facility] wheelchair ride has been set for a pickup windon of 1:09 PM to 1:54 PM today.     Yuli Jerome RN

## 2025-02-20 NOTE — PLAN OF CARE
Patient admitted to room 11 at approximately 1350 via cart from emergency room.  Reason for Admission: Multiple falls  Report received from: ED note in EMR  Patient was accompanied by transport.  Discharge transportation provided by:  Patient ambulated/transferred:  total assist. air mimi.  Patient is alert and orientated x 4.  Outpatient Observation education provided to: (patient, family, friend)  MDRO Education done if applicable (MRSA, VRE, etc)  Safety risks were identified during admission:  fall.   Yellow risk/fall band applied:  Yes  Home meds sent home: NA  Home meds sent to pharmacy:NA  Detailed Belongings: 1 shirt, 1 pair of pants, cell phone and cell phone

## 2025-02-20 NOTE — PROGRESS NOTES
Clinic Care Coordination Contact  Care Coordination Transition Communication    Clinical Data: Patient was hospitalized at [facility] from 2/18/25 to 2/20/25 with diagnosis of Weakness, tremor, pain; Parkinson disease (H).     Assessment: Patient has transitioned to TCU/ARU for short term rehabilitation:    [facility]   Transition Communication:  Notified facility of Primary Care- Care Coordination support via Epic fax.    Plan: Care Coordinator will await notification from facility staff informing of patient's discharge plans/needs. Care Coordinator will review chart and outreach to facility staff every 4 weeks and as needed.

## 2025-02-20 NOTE — PLAN OF CARE
"  Problem: Adult Inpatient Plan of Care  Goal: Patient-Specific Goal (Individualized)  Description: You can add care plan individualizations to a care plan. Examples of Individualization might be:  \"Parent requests to be called daily at 9am for status\", \"I have a hard time hearing out of my right ear\", or \"Do not touch me to wake me up as it startles  me\".  Outcome: Progressing     Problem: Adult Inpatient Plan of Care  Goal: Absence of Hospital-Acquired Illness or Injury  Intervention: Identify and Manage Fall Risk  Recent Flowsheet Documentation  Taken 2/20/2025 0119 by Daylin Waite RN  Safety Promotion/Fall Prevention:   activity supervised   assistive device/personal items within reach   mobility aid in reach   nonskid shoes/slippers when out of bed   room near nurse's station   safety round/check completed  Taken 2/19/2025 2319 by Daylin Waite RN  Safety Promotion/Fall Prevention:   activity supervised   assistive device/personal items within reach   mobility aid in reach   nonskid shoes/slippers when out of bed   room near nurse's station   safety round/check completed  Intervention: Prevent Skin Injury  Recent Flowsheet Documentation  Taken 2/20/2025 0119 by Daylin Waite RN  Body Position: position changed independently  Taken 2/19/2025 2319 by Daylin Waite RN  Body Position: position changed independently  Intervention: Prevent and Manage VTE (Venous Thromboembolism) Risk  Recent Flowsheet Documentation  Taken 2/20/2025 0119 by Daylin Waite RN  VTE Prevention/Management: SCDs off (sequential compression devices)  Taken 2/19/2025 2319 by Daylin Waite RN  VTE Prevention/Management: SCDs off (sequential compression devices)   Goal Outcome Evaluation:      Plan of Care Reviewed With: patient    Overall Patient Progress: improvingOverall Patient Progress: improving           "

## 2025-02-20 NOTE — PLAN OF CARE
Physical Therapy Discharge Summary    Reason for therapy discharge:    Discharged to transitional care facility.    Progress towards therapy goal(s). See goals on Care Plan in [facility] electronic health record for goal details.  Goals partially met.  Barriers to achieving goals:   limited tolerance for therapy and discharge from facility.    Therapy recommendation(s):    Continued therapy is recommended.  Rationale/Recommendations:  PT goals not fully met.

## 2025-02-20 NOTE — Clinical Note
FYI - pt discharged from hospital to [facility]; I will continue to monitor for discharge and complete TCM at that time. Pushed your outreach to reflect.

## 2025-02-20 NOTE — PLAN OF CARE
Occupational Therapy Discharge Summary    Reason for therapy discharge:    Discharged to transitional care facility.    Progress towards therapy goal(s). See goals on Care Plan in [facility] electronic health record for goal details.  Goals not met.  Barriers to achieving goals:   discharge from facility.    Therapy recommendation(s):    Continued therapy is recommended.  Rationale/Recommendations:  recommend continued OT services at TCU.    Tomeka Carreon OTR/L 2/20/25

## 2025-02-20 NOTE — PROGRESS NOTES
"PRIMARY DIAGNOSIS: \"GENERIC\" NURSING  OUTPATIENT/OBSERVATION GOALS TO BE MET BEFORE DISCHARGE:  ADLs back to baseline: No    Activity and level of assistance: Up with maximum assistance. Consider SW and/or PT evaluation.     Pain status: Pain free.    Return to near baseline physical activity: No     Discharge Planner Nurse   Safe discharge environment identified: Yes  Barriers to discharge: Yes       Entered by: Daylin Waite RN 02/20/2025 4:54 AM     Please review provider order for any additional goals.   Nurse to notify provider when observation goals have been met and patient is ready for discharge.    PT A&Ox4. On room air. VSS. Tylenol given for leg pain. On Regular diet. Scheduled trazodone and carbidopa-levodopa given. Purewick on.    "

## 2025-02-20 NOTE — DISCHARGE SUMMARY
[facility] MEDICINE  DISCHARGE SUMMARY     Primary Care Physician: Анна Jennings  Admission Date: 2/18/2025   Discharge Provider: Clint Saini MD Discharge Date: 2/20/2025   Diet:   Active Diet and Nourishment Order   Procedures    Regular Diet Adult    Diet       Code Status: Full Code   Activity: DCACTIVITY: Activity as tolerated        Condition at Discharge: Good     REASON FOR PRESENTATION(See Admission Note for Details)   Weakness, tremor, pain    PRINCIPAL & ACTIVE DISCHARGE DIAGNOSES     Active Problems:    Parkinson disease (H)      PENDING LABS     Unresulted Labs Ordered in the Past 30 Days of this Admission       No orders found from 1/19/2025 to 2/19/2025.              PROCEDURES ( this hospitalization only)          RECOMMENDATIONS TO OUTPATIENT PROVIDER FOR F/U VISIT     Follow-up Appointments       Follow Up and recommended labs and tests      Follow up with Nursing home physician.  No follow up labs or test are needed.                {Additional follow-up instructions/to-do's for PCP    : Monitor pain control  DISPOSITION     Skilled Nursing Facility    SUMMARY OF HOSPITAL COURSE:      Marlene is a 64-year-old female with advanced Parkinson's disease and sciatica admitted for bilateral paraparesis and physical deconditioning along with pain and increased Parkinson's symptoms.  By report she has been taking her home Parkinson medications on a different schedule than recommended.  Seen by neurology and medications provided at appropriate times with significant improvement in symptoms.  Also added gabapentin and as needed baclofen for spinal stenosis of her cervical spine.  Seen by neurosurgery who discussed surgical options with her but she deferred at this time and will get a second opinion.  Her pain has been well-controlled with current pain medication regimen which will be continued.    Discharge Medications with Med changes:     Current Discharge  Medication List        START taking these medications    Details   acetaminophen (TYLENOL) 325 MG tablet Take 2 tablets (650 mg) by mouth every 4 hours as needed for mild pain or other (and adjunct with moderate or severe pain or per patient request).  Qty: 30 tablet, Refills: 1    Associated Diagnoses: Pain in joint involving right pelvic region and thigh      Baclofen (LIORESAL) 5 MG tablet Take 1 tablet (5 mg) by mouth 3 times daily as needed for muscle spasms.  Qty: 20 tablet, Refills: 0    Associated Diagnoses: Pain in joint involving right pelvic region and thigh      gabapentin (NEURONTIN) 100 MG capsule Take 2 capsules (200 mg) by mouth 3 times daily.  Qty: 90 capsule, Refills: 0    Associated Diagnoses: Pain in joint involving right pelvic region and thigh      !! oxyCODONE (ROXICODONE) 5 MG tablet Take 0.5 tablets (2.5 mg) by mouth every 4 hours as needed for breakthrough pain.  Qty: 10 tablet, Refills: 0    Associated Diagnoses: Pain in joint involving right pelvic region and thigh      senna-docusate (SENOKOT-S/PERICOLACE) 8.6-50 MG tablet Take 1 tablet by mouth 2 times daily as needed for constipation.  Qty: 20 tablet, Refills: 0    Associated Diagnoses: Pain in joint involving right pelvic region and thigh       !! - Potential duplicate medications found. Please discuss with provider.        CONTINUE these medications which have NOT CHANGED    Details   aspirin 81 MG EC tablet Take 81 mg by mouth 2 times daily.      carbidopa-levodopa (RYTARY) 48. MG CPCR per CR capsule Take 1 capsule by mouth 3 times daily.  Qty: 270 capsule, Refills: 1    Associated Diagnoses: Parkinson's disease, unspecified whether dyskinesia present, unspecified whether manifestations fluctuate (H)      carbidopa-levodopa (SINEMET)  MG tablet Take 1 tablet by mouth 3 times daily.  Qty: 270 tablet, Refills: 1    Associated Diagnoses: Parkinson's disease, unspecified whether dyskinesia present, unspecified whether  manifestations fluctuate (H)      cyanocobalamin (VITAMIN B-12) 1000 MCG tablet Take 1 tablet (1,000 mcg) by mouth daily.  Qty: 30 tablet, Refills: 1    Associated Diagnoses: Parkinson's disease, unspecified whether dyskinesia present, unspecified whether manifestations fluctuate (H)      Lidocaine (LIDOCARE) 4 % Patch Place 1 patch over 12 hours onto the skin every 24 hours. To prevent lidocaine toxicity, patient should be patch free for 12 hrs daily.  Qty: 14 patch, Refills: 1    Associated Diagnoses: Acute bilateral low back pain with bilateral sciatica      melatonin 3 MG tablet Take 6 mg by mouth at bedtime.      !! oxyCODONE (ROXICODONE) 5 MG tablet Take 0.5 tablets (2.5 mg) by mouth every 6 hours as needed for moderate pain.  Qty: 10 tablet, Refills: 0    Associated Diagnoses: Acute bilateral low back pain with bilateral sciatica      thiamine (B-1) 100 MG tablet Take 1 tablet (100 mg) by mouth daily.  Qty: 90 tablet, Refills: 0    Associated Diagnoses: Parkinson's disease, unspecified whether dyskinesia present, unspecified whether manifestations fluctuate (H)      traZODone (DESYREL) 100 MG tablet Take 1 tablet (100 mg) by mouth at bedtime.  Qty: 30 tablet, Refills: 1    Associated Diagnoses: Insomnia, unspecified type      vitamin C (ASCORBIC ACID) 500 MG tablet Take 500 mg by mouth daily.      Vitamin D3 (VITAMIN D) 10 MCG (400 UNIT) tablet Take 400 Units by mouth daily.       !! - Potential duplicate medications found. Please discuss with provider.                Rationale for medication changes:              Consults       CARE MANAGEMENT / SOCIAL WORK IP CONSULT  PHYSICAL THERAPY ADULT IP CONSULT  OCCUPATIONAL THERAPY ADULT IP CONSULT  NEUROSURGERY IP CONSULT  NEUROLOGY IP CONSULT  NEUROLOGY IP CONSULT  PHYSICAL THERAPY ADULT IP CONSULT  OCCUPATIONAL THERAPY ADULT IP CONSULT    Immunizations given this encounter     Most Recent Immunizations   Administered Date(s) Administered    COVID-19 MONOVALENT  "12+ (Pfizer) 07/11/2021    TD,PF 7+ (Tenivac) 01/01/1998           Anticoagulation Information      Recent INR results: No results for input(s): \"INR\" in the last 168 hours.  Warfarin doses (if applicable) or name of other anticoagulant:       SIGNIFICANT IMAGING FINDINGS     Results for orders placed or performed during the hospital encounter of 02/18/25   MR Cervical Spine w/o Contrast    Impression    IMPRESSION:    1.  Extensive multilevel cervical spondylosis as detailed above.  2.  Spinal canal stenosis is greatest and advanced at C3-C4, C4-C5, and C5-C6. There is also moderate spinal canal stenosis at C6-C7.  3.  Neural foraminal stenosis is greatest and advanced on the left at C3-C4 and C5-C6 as well as the right at C4-C5. Less severe degrees of neural foraminal narrowing as above.  4.  No definite cord signal abnormality.  5.  Mild periarticular edema associated with the left facet joint at C3-C4 is most likely degenerative in etiology, though warrants correlation against any clinical evidence of infection given a small associated facet joint effusion (e.g. rule out septic   arthritis).         SIGNIFICANT LABORATORY FINDINGS     Most Recent 3 CBC's:  Recent Labs   Lab Test 02/19/25  0808 02/18/25  0739 01/30/25  0714 01/29/25  0510 01/26/25  0820   WBC 7.8 6.3  --   --  7.1   HGB 14.6 13.8 14.0  --  14.5   MCV 95 96  --   --  97    370 243   < > 306    < > = values in this interval not displayed.           Discharge Orders        Primary Care - Care Coordination Referral      General info for SNF    Length of Stay Estimate: Short Term Care: Estimated # of Days <30  Condition at Discharge: Improving  Level of care:skilled   Rehabilitation Potential: Good  Admission H&P remains valid and up-to-date: Yes  Recent Chemotherapy: N/A  Use Nursing Home Standing Orders: Yes     Mantoux instructions    Give two-step Mantoux (PPD) Per Facility Policy Yes     Follow Up and recommended labs and tests    Follow " up with Nursing home physician.  No follow up labs or test are needed.     Reason for your hospital stay    Pain, tremor     Activity - Up ad sharda     Full Code     Physical Therapy Adult Consult    Evaluate and treat as clinically indicated.    Reason: Weakness     Occupational Therapy Adult Consult    Evaluate and treat as clinically indicated.    Reason: Weakness     Fall precautions     Diet    Follow this diet upon discharge: Current Diet:Orders Placed This Encounter      Regular Diet Adult       Examination   Physical Exam   Temp:  [98  F (36.7  C)-98.7  F (37.1  C)] 98.5  F (36.9  C)  Pulse:  [64-82] 73  Resp:  [18] 18  BP: (104-149)/(51-76) 105/58  SpO2:  [96 %-98 %] 98 %  Wt Readings from Last 1 Encounters:   02/19/25 51.5 kg (113 lb 8.6 oz)       General: No apparent distress, she reports feeling much improved, no significant pain,  Neurologic: No tremor currently  Heart: Rate rate rhythm      Please see EMR for more detailed significant labs, imaging, consultant notes etc.    I, Clint Saini MD, personally saw the patient today and spent greater than 30 minutes discharging this patient.    Clint Saini MD  [facility]    CC:Анна Jennings

## 2025-02-20 NOTE — PROGRESS NOTES
Care Management Follow Up    Length of Stay (days): 0    Expected Discharge Date: 02/20/2025    Anticipated Discharge Plan:   Transitional care (TCU) is recommended for continued therapy and skilled nursing.    Transportation: TBD    PT Recommendations: Transitional Care Facility  OT Recommendations:  Transitional Care Facility     Barriers to Discharge: placement    Prior Living Situation: house (within a senior living community) with alone    Discussed  Partnership in Safe Discharge Planning  document with patient/family: No     Handoff Completed: No, handoff not indicated or clinically appropriate    Patient/Spokesperson Updated: Yes. Who? Marlene    Additional Information:  Patient lives alone and has a history of Parkinson's. Transitional care (TCU) is recommended for continued therapy and skilled nursing and patient agrees. She is ambulating well with therapy today. Met with patient who plans to return home after TCU and would like to have home care at that point. Her family provides support for transportation, shopping and medication set up.   Multiple TCU referrals made and are pending. Reviewed with [facility] liaison. Left a message for daughter Bibiana to review goal for discharge from transitional care (home would be the preferred goal). Discussed out of pocket cost of [facility] medical transportation by wheelchair with patient. Patient member agreed with the plan to have transportation arranged by [facility] transport if family is not able (son works in [place] until 4:00 PM).   9:23 AM: Received a call from admissions for [facility] TCU.  They can accept patient for admission today between 1300 and 1800.   9:33 AM:  Update provided with Bibiana.    Next Steps: Coordinate post-acute care services.    Yuli Jerome RN

## 2025-02-20 NOTE — TELEPHONE ENCOUNTER
Ratna from [facility] calling to inform PCP that patient does not qualify for home care. Ratna was at the patients home on Sunday 2/16 for an evaluation and discovered that the patient does not qualify for care. So, she is recommending PCP place referral for outpatient. Patient is able to get rides and told home care that she would like to go to [facility] in [place]. Please advise.    Lopez Rodriguez RN

## 2025-02-20 NOTE — PLAN OF CARE
PRIMARY DIAGNOSIS: Falls  OUTPATIENT/OBSERVATION GOALS TO BE MET BEFORE DISCHARGE:  ADLs back to baseline: No    Activity and level of assistance: up with multiple assist.    Pain status: Improved-controlled with oral pain medications.    Return to near baseline physical activity: No     Discharge Planner Nurse   Safe discharge environment identified: Yes  Barriers to discharge: Yes       Entered by: Ailyn Nichols RN 02/19/2025 6:29 PM    Patient is alert and orientated x 4.  Needs total assist with positioning in bed.  C/O pain bilateral thighs, left hip and low back. Patient requested PRN PO Tylenol with fair relief.

## 2025-02-20 NOTE — PROGRESS NOTES
PRIMARY DIAGNOSIS: Falls  OUTPATIENT/OBSERVATION GOALS TO BE MET BEFORE DISCHARGE:  ADLs back to baseline: No    Activity and level of assistance: Up with maximum assistance. Consider SW and/or PT evaluation.     Pain status: Improved-controlled with oral pain medications.    Return to near baseline physical activity: No     Discharge Planner Nurse   Safe discharge environment identified: Yes  Barriers to discharge: Yes       Entered by: Andre Murray RN 02/19/2025 9:20 PM   Pt alert and oriented. VSS on RA. Mild to moderate pain controlled with scheduled medications. Makes needs known.   Please review provider order for any additional goals.   Nurse to notify provider when observation goals have been met and patient is ready for discharge..

## 2025-02-20 NOTE — PLAN OF CARE
"  Problem: Adult Inpatient Plan of Care  Goal: Absence of Hospital-Acquired Illness or Injury  Intervention: Prevent Skin Injury  Recent Flowsheet Documentation  Taken 2/20/2025 0743 by Kateryna Mcdowell RN  Body Position:   weight shifting   turned   right   left     Problem: Delirium  Goal: Optimal Coping  Outcome: Met  Intervention: Optimize Psychosocial Adjustment to Delirium  Recent Flowsheet Documentation  Taken 2/20/2025 0743 by Kateryna Mcdowell RN  Family/Support System Care: support provided     Problem: Delirium  Goal: Improved Attention and Thought Clarity  Outcome: Met   PRIMARY DIAGNOSIS: \"GENERIC\" NURSING  OUTPATIENT/OBSERVATION GOALS TO BE MET BEFORE DISCHARGE:  ADLs back to baseline: Yes    Activity and level of assistance: assist of 1 with gait belt/walker    Pain status: Improved-controlled with oral pain medications.    Return to near baseline physical activity: Yes     Discharge Planner Nurse   Safe discharge environment identified: Yes  Barriers to discharge: No  Problem: Adult Inpatient Plan of Care  Goal: Absence of Hospital-Acquired Illness or Injury  Intervention: Prevent Infection  Recent Flowsheet Documentation  Taken 2/20/2025 0743 by Kateryna Mcdowell RN  Infection Prevention: hand hygiene promoted          Entered by: Kateryna Mcdowell RN 02/20/2025 10:01 AM   Pt A&O, generalized pain 9/10 scheduled Tylenol given, up walking hallway with PT assist of 1 gait belt/walker, waiting TCU placement,    Please review provider order for any additional goals.   Nurse to notify provider when observation goals have been met and patient is ready for discharge.Goal Outcome Evaluation:                        "

## 2025-02-20 NOTE — PROGRESS NOTES
NEUROLOGY PROGRESS NOTE     Marlene Merida,  1960, MRN [id number] Date: 2025     [facility]   Code status:  Full Code   PCP: Анна Jennings, [phone]      ASSESSMENT & PLAN   Diagnosis code: Parkinson's disease    Parkinson's disease  Severe cervical spinal stenosis  Leg pain    Patient's leg symptoms are most likely related to the cervical spinal stenosis.  These would not be completely consistent with Parkinson's disease.  Neurosurgical consultation for further evaluation of cervical spinal stenosis  Recommend taking immediate release Sinemet and Rytary 3 times daily at the same time instead of different times as the former was working better for her.  Parkinson's disease appears appropriately controlled at the time when I was examining her.  Encouraged her not to mix these medications (Rytary and Sinemet) with food as that affects the absorption (she is currently taking them with food)  Physical therapy  Will add low dose baclofen as needed for leg pain to see if that helps.  Suspect this is more related to the cervical spinal stenosis than Parkinson's disease.  Outpatient follow-up with Dr. Niño in 2 weeks.  She already has a clinic appointment.    Discharge:-Per primary team.  Will sign off.  Please call with any further questions.       Chief Complaint   Patient presents with    Generalized Weakness        HISTORY OF PRESENT ILLNESS     We have been requested by Dr. Og to evaluate Marlene Merida who is a 64 year old  female for evaluation of Parkinson's disease.  This is a 64-year-old female recently admitted to the hospital for low back pain, bilateral leg pain and Parkinson's disease.  She at baseline is on Rytary and immediate release Sinemet was added on top of that.  She reports that things were working better at home then but now in the hospital.  She was taking the immediate release at the same time as the Rytary and reports that her dose has been changed in the hospital  which is making the leg symptoms worse.  Does complain of some difficulty with ambulation.  MRI C-spine has shown severe spinal stenosis.  Neurosurgery is involved.  No other new symptoms.  Tremors are under good control.  Per patient no recent falls.    2/20  She continues to have the leg pain.  Neck pain is more like a muscle cramp.  Parkinson's is better with the medication adjustment.     PAST MEDICAL & SURGICAL HISTORY     Medical History  Past Medical History:   Diagnosis Date    Parkinson's disease (H) 2019     Surgical History  Past Surgical History:   Procedure Laterality Date    BREAST SURGERY      GYN SURGERY          SOCIAL HISTORY     Social History     Tobacco Use    Smoking status: Never     Passive exposure: Never    Smokeless tobacco: Never   Vaping Use    Vaping status: Never Used   Substance Use Topics    Alcohol use: Yes     Comment: 1-2 beers a month     Drug use: Never        FAMILY HISTORY     Reviewed, and family history includes Alzheimer Disease in her mother; Arthritis in her father; Diabetes in her brother and paternal grandmother; Hypertension in her father and mother.     ALLERGIES     No Known Allergies     REVIEW OF SYSTEMS     12 system ROS was done within the HPI this was negative.  Pertinent positives noted in the HPI.     HOME & HOSPITAL MEDICATIONS     Prior to Admission Medications  Medications Prior to Admission   Medication Sig Dispense Refill Last Dose/Taking    aspirin 81 MG EC tablet Take 81 mg by mouth 2 times daily.       carbidopa-levodopa (RYTARY) 48. MG CPCR per CR capsule Take 1 capsule by mouth 3 times daily. 270 capsule 1     carbidopa-levodopa (SINEMET)  MG tablet Take 1 tablet by mouth 3 times daily. 270 tablet 1     cyanocobalamin (VITAMIN B-12) 1000 MCG tablet Take 1 tablet (1,000 mcg) by mouth daily. 30 tablet 1     Lidocaine (LIDOCARE) 4 % Patch Place 1 patch over 12 hours onto the skin every 24 hours. To prevent lidocaine toxicity, patient should  "be patch free for 12 hrs daily. 14 patch 1     melatonin 3 MG tablet Take 6 mg by mouth at bedtime.       oxyCODONE (ROXICODONE) 5 MG tablet Take 0.5 tablets (2.5 mg) by mouth every 6 hours as needed for moderate pain. 10 tablet 0     thiamine (B-1) 100 MG tablet Take 1 tablet (100 mg) by mouth daily. 90 tablet 0     traZODone (DESYREL) 100 MG tablet Take 1 tablet (100 mg) by mouth at bedtime. 30 tablet 1     vitamin C (ASCORBIC ACID) 500 MG tablet Take 500 mg by mouth daily.       Vitamin D3 (VITAMIN D) 10 MCG (400 UNIT) tablet Take 400 Units by mouth daily.          Hospital Medications  Current Facility-Administered Medications   Medication Dose Route Frequency Provider Last Rate Last Admin    aspirin EC tablet 81 mg  81 mg Oral BID Kathy Og PA-C   81 mg at 02/20/25 0742    carbidopa-levodopa (RYTARY) 48. MG per CR capsule 1 capsule  1 capsule Oral TID Kathy Og PA-C   1 capsule at 02/20/25 0745    carbidopa-levodopa (SINEMET)  MG per tablet 1 tablet  1 tablet Oral TID Refugio Bruner MD   1 tablet at 02/20/25 0743    enoxaparin ANTICOAGULANT (LOVENOX) injection 40 mg  40 mg Subcutaneous Q24H John aVzquez MD   40 mg at 02/20/25 0743    gabapentin (NEURONTIN) capsule 200 mg  200 mg Oral TID John Vazquez MD   200 mg at 02/20/25 0743    Lidocaine (LIDOCARE) 4 % Patch 1 patch  1 patch Transdermal Q24H Kathy Og PA-C   1 patch at 02/19/25 1604    traZODone (DESYREL) tablet 100 mg  100 mg Oral At Bedtime Kathy Og PA-C   100 mg at 02/19/25 2310        PHYSICAL EXAM     Vital signs  Temp:  [98  F (36.7  C)-98.7  F (37.1  C)] 98.3  F (36.8  C)  Pulse:  [64-82] 64  Resp:  [18] 18  BP: (104-149)/(51-76) 124/58  SpO2:  [96 %-97 %] 97 %    PHYSICAL EXAMINATION  VITALS: /58   Pulse 64   Temp 98.3  F (36.8  C) (Oral)   Resp 18   Ht 1.676 m (5' 6\")   Wt 51.5 kg (113 lb 8.6 oz)   LMP  (LMP Unknown)   SpO2 97%   BMI 18.33 kg/m    GENERAL -Health appearing, No apparent distress  EYES- No " scleral icterus, no eyelid droop, Pupils - see Neuro section  HEENT - Normocephalic, atraumatic, Hearing grossly intact; Oral mucosa moist and pink in color. External Ears and nose intact.   Neck - supple   PULM - Good spontaneous respiratory effort;   CV- Pedal pulses present with no peripheral edema/ No significant varicosities.  MSK- Gait - see Neuro section; Strength and tone- see Neuro section; Range of motion grossly intact.  PSYCH -cooperative    Neurological  Mental status - Patient is awake and grossly oriented to self, place and time. Attention span is normal. Language is fluent and follows commands appropriately.   Cranial nerves - CN II-XII intact. Pupils are reactive and symmetric; EOMI, VFIT, NLF symmetric  Motor - There is no pronator drift. Motor exam shows 5/5 strength in all extremities with subjective weakness proximally in both lower extremities.  Might be related to poor effort.  Tone - Tone is symmetric bilaterally in upper and lower extremities.  No cogwheeling rigidity or tremor noted.  Reflexes -toes are equal vocal.  Sensation - Sensory exam is grossly intact to light touch, pain.  Coordination - Finger to nose and heel to shin is without dysmetria.  Gait and station --able to ambulate with assistance.  Formal gait testing cannot be done due to safety concerns from ongoing medical issues.  Exam stable.  Weakness in the leg seems better compared to yesterday.     DIAGNOSTIC STUDIES     Pertinent Radiology   MRI L spine  IMPRESSION:  1.  Moderately advanced thoracolumbar levoscoliosis with multilevel disc degeneration and facet hypertrophy as described above. Negative for spinal canal stenosis. Multifocal mild to moderate neural foraminal stenoses.  2.  Modic type I endplate change at L2-L3.    MRI brain  IMPRESSION:  1.  No acute intracranial process.  2.  Generalized brain atrophy and presumed microvascular ischemic changes as detailed above.    MRI C spine  IMPRESSION:     1.  Extensive  multilevel cervical spondylosis as detailed above.  2.  Spinal canal stenosis is greatest and advanced at C3-C4, C4-C5, and C5-C6. There is also moderate spinal canal stenosis at C6-C7.  3.  Neural foraminal stenosis is greatest and advanced on the left at C3-C4 and C5-C6 as well as the right at C4-C5. Less severe degrees of neural foraminal narrowing as above.  4.  No definite cord signal abnormality.  5.  Mild periarticular edema associated with the left facet joint at C3-C4 is most likely degenerative in etiology, though warrants correlation against any clinical evidence of infection given a small associated facet joint effusion (e.g. rule out septic   arthritis).    Component      Latest Ref Rng 2/19/2025  8:08 AM   Sodium      135 - 145 mmol/L 142    Potassium      3.4 - 5.3 mmol/L 4.2    Chloride      98 - 107 mmol/L 104    Carbon Dioxide (CO2)      22 - 29 mmol/L 27    Anion Gap      7 - 15 mmol/L 11    Urea Nitrogen      8.0 - 23.0 mg/dL 19.3    Creatinine      0.51 - 0.95 mg/dL 0.53    GFR Estimate      >60 mL/min/1.73m2 >90    Calcium      8.8 - 10.4 mg/dL 9.4    Glucose      70 - 99 mg/dL 91    WBC      4.0 - 11.0 10e3/uL 7.8    RBC Count      3.80 - 5.20 10e6/uL 4.50    Hemoglobin      11.7 - 15.7 g/dL 14.6    Hematocrit      35.0 - 47.0 % 42.8    MCV      78 - 100 fL 95    MCH      26.5 - 33.0 pg 32.4    MCHC      31.5 - 36.5 g/dL 34.1    RDW      10.0 - 15.0 % 14.0    Platelet Count      150 - 450 10e3/uL 351          No results found for this or any previous visit (from the past 24 hours).      Total time spent for face to face visit, reviewing labs/imaging studies, counseling and coordination of care was: Over 35 min More than 50% of this time was spent on counseling and coordination of care.    Counseling patient.  Prescription management.  Reviewing symptoms.    Refugio Bruner MD  Neurologist  [facility]  Tel:- [phone]

## 2025-02-20 NOTE — PROGRESS NOTES
[facility]    Neurosurgery  Daily Note    Assessment & Plan   Marlene Merida is a 64 year old female past medical history parkinson's presents to [facility] ED today for evaluation of a fall and admitted 2/18/25. NSGY consulted for cervical stenosis. Per chart review, patient was hospitalized 2/13/25 after a fall, was discharged home, patient lives alone.    Reports bilateral upper and lower extremity pain and paresthesia. Baseline tremors and shuffling giat due to Parkinson's. Notes that her legs have becomes much weaker recently as she has had 3 fall in 12 days, and never fell before then.    Denies acute bowel or bladder dysfunction, saddle anesthesia.     Patient follows with Dr. Bridger Niño, neurology, for advanced Parkinson's, currently on Sinemet.     On exam, patient is tremulous and has generalized upper and lower extremity weakness with most profound weakness in bilateral hip flexion, knee flexion and knee extension maneuvers.        EXAM: MR CERVICAL SPINE W/O CONTRAST  LOCATION: [facility]  DATE: 2/18/2025     INDICATION: cervical stensosis  COMPARISON: Cervical spine CT: 2/18/2025.  TECHNIQUE: MRI Cervical Spine without IV contrast.                                                                  IMPRESSION:     1.  Extensive multilevel cervical spondylosis as detailed above.  2.  Spinal canal stenosis is greatest and advanced at C3-C4, C4-C5, and C5-C6. There is also moderate spinal canal stenosis at C6-C7.  3.  Neural foraminal stenosis is greatest and advanced on the left at C3-C4 and C5-C6 as well as the right at C4-C5. Less severe degrees of neural foraminal narrowing as above.  4.  No definite cord signal abnormality.  5.  Mild periarticular edema associated with the left facet joint at C3-C4 is most likely degenerative in etiology, though warrants correlation against any clinical evidence of infection given a small associated facet joint  effusion (e.g. rule out septic arthritis).      Plan:  -Neurology ruled out Parkinson's as cause of falls  -Offered surgery, patient has declined at this time, would like a second opinion first  -Will have her see Dr Nick in clinic to go over imaging and surgical options in a week or two  -She stated understanding of the risk/benefit of surgery and the risks of no surgery  -No neurosurgical intervention warranted at this time  -We will sign off at this time.    ADDENDUM  Soonest she would be able to have surgery is March 3rd 2025 due to openings and off baby aspirin    Phillip Lopes, DNP, APRN, CNP  [facility] Neurosurgery  Tel [phone]      Interval History   Stable.  Doing well.  Improving slowly.  Pain is reasonably controlled.  No fevers.     Physical Exam   Temp: 98.3  F (36.8  C) Temp src: Oral BP: 124/58 Pulse: 64   Resp: 18 SpO2: 97 % O2 Device: None (Room air)    Vitals:    02/18/25 1519 02/19/25 1358   Weight: 115 lb (52.2 kg) 113 lb 8.6 oz (51.5 kg)     Vital Signs with Ranges  Temp:  [98  F (36.7  C)-98.7  F (37.1  C)] 98.3  F (36.8  C)  Pulse:  [64-82] 64  Resp:  [18] 18  BP: (104-149)/(51-76) 124/58  SpO2:  [96 %-97 %] 97 %  I/O last 3 completed shifts:  In: 420 [P.O.:420]  Out: -       NEUROLOGICAL EXAMINATION:   Mental status: alert and oriented x3 speech clear and appropriate   Cranial nerves: II-XII intact  Motor strength:   Biceps: 5/5 right, 5/5 left   Wrist extensors:5/5 right, 5/5 left   Triceps: 5/5 right, 5/5 left   Deltoids: 5/5 right, 5/5 left   Finger flexion: 5/5 right, 5/5 left   Finger abduction:5/5 right, 5/5 left   Hand : 5/5 right, 5/5 left     Hip flexors: 3/5 right, 3/5 left   Quadriceps: 3/5 right, 3/5 left  Ankle dorsiflexion: 5/5 right, 5/5 left    Ankle plantar flexion:5/5 right, 5/5 left   Extensor hallicus longus: 5/5 right, 5/5 left   Sensation:  intact  Reflexes:  Negative Atkins's, Negative Babinski.  Negative Clonus.    Coordination:  Smooth finger to  "nose testing.   Negative pronator drift.    Gait:  not tested    CBC RESULTS:   Recent Labs   Lab Test 02/19/25  0808   WBC 7.8   RBC 4.50   HGB 14.6   HCT 42.8   MCV 95   MCH 32.4   MCHC 34.1   RDW 14.0        Basic Metabolic Panel:  Lab Results   Component Value Date     02/19/2025      Lab Results   Component Value Date    POTASSIUM 4.2 02/19/2025    POTASSIUM 4.0 08/08/2022     Lab Results   Component Value Date    CHLORIDE 104 02/19/2025    CHLORIDE 108 08/08/2022     Lab Results   Component Value Date    TITO 9.4 02/19/2025     Lab Results   Component Value Date    CO2 27 02/19/2025    CO2 26 08/08/2022     Lab Results   Component Value Date    BUN 19.3 02/19/2025    BUN 21 08/08/2022     Lab Results   Component Value Date    CR 0.53 02/19/2025     Lab Results   Component Value Date    GLC 91 02/19/2025    GLC 98 08/08/2022     INR:  No results found for: \"INR\"        Medications   Current Facility-Administered Medications   Medication Dose Route Frequency Provider Last Rate Last Admin      Current Facility-Administered Medications   Medication Dose Route Frequency Provider Last Rate Last Admin    aspirin EC tablet 81 mg  81 mg Oral BID Kathy Og PA-C   81 mg at 02/20/25 0742    carbidopa-levodopa (RYTARY) 48. MG per CR capsule 1 capsule  1 capsule Oral TID Kathy Og PA-C   1 capsule at 02/20/25 0745    carbidopa-levodopa (SINEMET)  MG per tablet 1 tablet  1 tablet Oral TID Refugio Bruner MD   1 tablet at 02/20/25 0743    enoxaparin ANTICOAGULANT (LOVENOX) injection 40 mg  40 mg Subcutaneous Q24H John Vazquez MD   40 mg at 02/20/25 0743    gabapentin (NEURONTIN) capsule 200 mg  200 mg Oral TID John Vazquez MD   200 mg at 02/20/25 0743    Lidocaine (LIDOCARE) 4 % Patch 1 patch  1 patch Transdermal Q24H Kathy Og PA-C   1 patch at 02/19/25 1604    traZODone (DESYREL) tablet 100 mg  100 mg Oral At Bedtime Kathy Og PA-C   100 mg at 02/19/25 8289            "

## 2025-02-20 NOTE — LETTER
[facility]  [address]  [facility] [address]   February 20, 2025    Marlene Merida  [address]     [facility] [address]      [facility]     To:   Cerenity Chickaloon           Please give to facility    From:   C[place]en [place]  [facility]  Care Coordinator   [facility]   P: [phone]  [email or web address]   Patient Name:  Marlene Merida YOB: 1960   Admit date: 2/20/25      *Information Needed:  Please contact me when the patient will discharge (or if they will move to long term care)- include the discharge date, disposition, and main diagnosis   If the patient is discharged with home care services, please provide the name of the agency    Also- Please inform me if a care conference is being held.   Phone, Fax or Email with information                              Thank you

## 2025-02-21 NOTE — TELEPHONE ENCOUNTER
Confirmed with Ary Peña, therapy referral for physical therapy and for the following diagnoses-  R26.89 (ICD-10-CM) - Decreased functional mobility   G20.A1 (ICD-10-CM) - Parkinson's disease (H)   W19.XXXA (ICD-10-CM) - Fall

## 2025-02-24 ENCOUNTER — TELEPHONE (OUTPATIENT)
Dept: NEUROSURGERY | Facility: CLINIC | Age: 65
End: 2025-02-24

## 2025-02-24 ENCOUNTER — TRANSITIONAL CARE UNIT VISIT (OUTPATIENT)
Dept: GERIATRICS | Facility: CLINIC | Age: 65
End: 2025-02-24
Payer: COMMERCIAL

## 2025-02-24 VITALS
HEIGHT: 66 IN | DIASTOLIC BLOOD PRESSURE: 75 MMHG | BODY MASS INDEX: 18.05 KG/M2 | SYSTOLIC BLOOD PRESSURE: 118 MMHG | HEART RATE: 68 BPM | OXYGEN SATURATION: 98 % | TEMPERATURE: 98.1 F | RESPIRATION RATE: 18 BRPM | WEIGHT: 112.3 LBS

## 2025-02-24 DIAGNOSIS — R62.7 FAILURE TO THRIVE IN ADULT: ICD-10-CM

## 2025-02-24 DIAGNOSIS — G20.A1 PSYCHOSIS DUE TO PARKINSON'S DISEASE (H): ICD-10-CM

## 2025-02-24 DIAGNOSIS — F06.8 PSYCHOSIS DUE TO PARKINSON'S DISEASE (H): ICD-10-CM

## 2025-02-24 DIAGNOSIS — G89.29 ACUTE ON CHRONIC BACK PAIN: ICD-10-CM

## 2025-02-24 DIAGNOSIS — M54.9 ACUTE ON CHRONIC BACK PAIN: ICD-10-CM

## 2025-02-24 DIAGNOSIS — Z74.09 IMPAIRED MOBILITY AND ACTIVITIES OF DAILY LIVING: ICD-10-CM

## 2025-02-24 DIAGNOSIS — R44.3 HALLUCINATIONS: ICD-10-CM

## 2025-02-24 DIAGNOSIS — Z78.9 IMPAIRED MOBILITY AND ACTIVITIES OF DAILY LIVING: ICD-10-CM

## 2025-02-24 DIAGNOSIS — R53.1 GENERAL WEAKNESS: ICD-10-CM

## 2025-02-24 DIAGNOSIS — G20.A1 PARKINSON'S DISEASE, UNSPECIFIED WHETHER DYSKINESIA PRESENT, UNSPECIFIED WHETHER MANIFESTATIONS FLUCTUATE (H): Primary | ICD-10-CM

## 2025-02-24 PROCEDURE — 99310 SBSQ NF CARE HIGH MDM 45: CPT | Performed by: NURSE PRACTITIONER

## 2025-02-24 NOTE — TELEPHONE ENCOUNTER
----- Message from Janelle Tobar sent at 2/20/2025 10:54 AM CST -----  Regarding: RE: follow up  2/20-Called patient but she stated she will have to give us a call back-  ----- Message -----  From: Phillip Lopes APRN CNP  Sent: 2/20/2025  10:42 AM CST  To: [facility] Scheduling Registration Pool  Subject: follow up                                        Please schedule follow up with Dr Nick in clinic in 1 to 2 weeks

## 2025-02-24 NOTE — TELEPHONE ENCOUNTER
Date: February 24, 2025    Provider: Dr. Sandoval Schreiber MD     Provider/Other:     Reason for out-going call:  HOSPITAL/ ED FOLLOW UP       Detailed message: Left voicemail to schedule post ED 1-2 week follow up with Dr. Nick per staff message.           Number provider for patient: [facility]: [phone]

## 2025-02-24 NOTE — PROGRESS NOTES
"[facility] GERIATRIC SERVICES    Code Status:  FULL CODE   Visit Type:   Chief Complaint   Patient presents with    TCU Admission     [facility] 2/18/2025 - 2/20/2025      Facility:  [facility] ([facility]) [[id number]]         HPI: Marlene Merida is a 64 year old female who I am seeing today for admit to the TCU. Past medical history includes advanced Parkinson's disease with hx of hallucinations, spinal stenosis, chronic back pain with sciatica. Pt hospitalized for increased parkinson symptoms with bilateral paraparesis, deconditioning, hallucinations and FTT. Per the records pt was taking her parkinson meds on a different schedule than ordered. Pt seen by neurology and medications changed with improvement in symptoms. Acute on chronic back pain with sciatica. Gabapentin and baclofen added for pain.     Transitional Care Course: Today pt sitting up in wheelchair. Parkinson on carbidopa-levodopa. No tremors on exam. Pt reports tremors when meds start to wear off. Weakness noted. Per the records pt had hallucinations over the weekend \"thinking a rat was in her bed. \" Pt was placed on trazodone out pt for hallucinations however she refused this 2 nights. She did take it last evening. We did discuss this today. Pt reports poor appetite. She reports \"I usually lay on the floor and eat in cozy blankets. Pt denies any SOB or CP. Chronic back pain. Continues with PTA regimen. Pt not always good about asking for meds. Nursing asking for tylenol scheduled.       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   -Ok to PT, OT to eval and treat.   -Continue PTA trazadone.     Acute on chronic back pain  -hx of spinal stenosis.   -schedule tylenol 650 mg TID.   -pain assessment Q 4 hours. "   -baclofen 5 mg TID prn.   -gabapentin 200 mg TID.     Failure to thrive in adult  -RD to consult.       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    Post Discharge Medication Reconciliation Status: discharge medications reconciled and changed, per note/orders  Current Outpatient Medications   Medication Sig Dispense Refill    acetaminophen (TYLENOL) 325 MG tablet Take 2 tablets (650 mg) by mouth every 4 hours as needed for mild pain or other (and adjunct with moderate or severe pain or per patient request). 30 tablet 1    aspirin 81 MG EC tablet Take 81 mg by mouth 2 times daily.      Baclofen (LIORESAL) 5 MG tablet Take 1 tablet (5 mg) by mouth 3 times daily as needed for muscle spasms. 20 tablet 0    carbidopa-levodopa (RYTARY) 48. MG CPCR per CR capsule Take 1 capsule by mouth 3 times daily. 270 capsule 1    carbidopa-levodopa (SINEMET)  MG tablet Take 1 tablet by mouth 3 times daily. 270 tablet 1    cyanocobalamin (VITAMIN B-12) 1000 MCG tablet Take 1 tablet (1,000 mcg) by mouth daily. 30 tablet 1    gabapentin (NEURONTIN) 100 MG capsule Take 2 capsules (200 mg) by mouth  "3 times daily. 90 capsule 0    Lidocaine (LIDOCARE) 4 % Patch Place 1 patch over 12 hours onto the skin every 24 hours. To prevent lidocaine toxicity, patient should be patch free for 12 hrs daily. 14 patch 1    melatonin 3 MG tablet Take 6 mg by mouth at bedtime.      oxyCODONE (ROXICODONE) 5 MG tablet Take 0.5 tablets (2.5 mg) by mouth every 4 hours as needed for breakthrough pain. 30 tablet 0    senna-docusate (SENOKOT-S/PERICOLACE) 8.6-50 MG tablet Take 1 tablet by mouth 2 times daily as needed for constipation. 20 tablet 0    thiamine (B-1) 100 MG tablet Take 1 tablet (100 mg) by mouth daily. 90 tablet 0    traZODone (DESYREL) 100 MG tablet Take 1 tablet (100 mg) by mouth at bedtime. 30 tablet 1    vitamin C (ASCORBIC ACID) 500 MG tablet Take 500 mg by mouth daily.      Vitamin D3 (VITAMIN D) 10 MCG (400 UNIT) tablet Take 400 Units by mouth daily.       No current facility-administered medications for this visit.       REVIEW OF SYSTEMS:   10 point review of systems reviewed and pertinent positives in the HPI.     PHYSICAL EXAMINATION:  Physical Exam     Vital signs: /75   Pulse 68   Temp 98.1  F (36.7  C)   Resp 18   Ht 1.676 m (5' 6\")   Wt 50.9 kg (112 lb 4.8 oz)   LMP  (LMP Unknown)   SpO2 98%   BMI 18.13 kg/m    General: Awake, Alert, oriented x3, sitting up in wheelchair, appropriately, follows simple commands, conversant  HEENT:Pink conjunctiva, anicteric sclerae, dry oral mucosa  NECK: Supple  CVS:  S1  S2, without murmur or gallop.   LUNG: Clear to auscultation, No wheezes, rales or rhonci.  BACK: No kyphosis of the thoracic spine  ABDOMEN: Soft, thin, non tender to palpation, with positive bowel sounds  EXTREMITIES: moves both upper and lower extremities with generalized weakness, no pedal edema, no calf tenderness  SKIN: Warm and dry  NEUROLOGIC: No tremors. No cogwheel rigidity.   PSYCHIATRIC: pleasant affect.       Labs:  All labs reviewed in the nursing home record and Epic   @  Lab " Results   Component Value Date    WBC 7.8 02/19/2025     Lab Results   Component Value Date    RBC 4.50 02/19/2025     Lab Results   Component Value Date    HGB 14.6 02/19/2025     Lab Results   Component Value Date    HCT 42.8 02/19/2025     Lab Results   Component Value Date    MCV 95 02/19/2025     Lab Results   Component Value Date    MCH 32.4 02/19/2025     Lab Results   Component Value Date    MCHC 34.1 02/19/2025     Lab Results   Component Value Date    RDW 14.0 02/19/2025     Lab Results   Component Value Date     02/19/2025        @Last Comprehensive Metabolic Panel:  Sodium   Date Value Ref Range Status   02/19/2025 142 135 - 145 mmol/L Final     Potassium   Date Value Ref Range Status   02/19/2025 4.2 3.4 - 5.3 mmol/L Final   08/08/2022 4.0 3.5 - 5.0 mmol/L Final     Chloride   Date Value Ref Range Status   02/19/2025 104 98 - 107 mmol/L Final   08/08/2022 108 (H) 98 - 107 mmol/L Final     Carbon Dioxide (CO2)   Date Value Ref Range Status   02/19/2025 27 22 - 29 mmol/L Final   08/08/2022 26 22 - 31 mmol/L Final     Anion Gap   Date Value Ref Range Status   02/19/2025 11 7 - 15 mmol/L Final   08/08/2022 7 5 - 18 mmol/L Final     Glucose   Date Value Ref Range Status   02/19/2025 91 70 - 99 mg/dL Final   08/08/2022 98 70 - 125 mg/dL Final     Urea Nitrogen   Date Value Ref Range Status   02/19/2025 19.3 8.0 - 23.0 mg/dL Final   08/08/2022 21 8 - 22 mg/dL Final     Creatinine   Date Value Ref Range Status   02/19/2025 0.53 0.51 - 0.95 mg/dL Final     GFR Estimate   Date Value Ref Range Status   02/19/2025 >90 >60 mL/min/1.73m2 Final     Comment:     eGFR calculated using 2021 CKD-EPI equation.   07/03/2021 >60 >60 mL/min/1.73m2 Final     Calcium   Date Value Ref Range Status   02/19/2025 9.4 8.8 - 10.4 mg/dL Final       > 45 minutes spent preparing for this visit which included reviewing hospital records, labs, imaging, meds, consults as well as face to face time with pt and collaborating with  nursing.       This note has been dictated using voice recognition software. Any grammatical or context distortions are unintentional and inherent to the software    Electronically signed by: Yun Melo CNP

## 2025-02-24 NOTE — LETTER
" 2/24/2025      Marlene Merida  [address]   [address]  [place] [address]         HEALTH GERIATRIC SERVICES    Code Status:  FULL CODE   Visit Type:   Chief Complaint   Patient presents with     TCU Admission     [facility] 2/18/2025 - 2/20/2025      Facility:  [facility] ([facility]) [[id number]]         HPI: Marlene Merida is a 64 year old female who I am seeing today for admit to the TCU. Past medical history includes advanced Parkinson's disease with hx of hallucinations, spinal stenosis, chronic back pain with sciatica. Pt hospitalized for increased parkinson symptoms with bilateral paraparesis, deconditioning, hallucinations and FTT. Per the records pt was taking her parkinson meds on a different schedule than ordered. Pt seen by neurology and medications changed with improvement in symptoms. Acute on chronic back pain with sciatica. Gabapentin and baclofen added for pain.     Transitional Care Course: Today pt sitting up in wheelchair. Parkinson on carbidopa-levodopa. No tremors on exam. Pt reports tremors when meds start to wear off. Weakness noted. Per the records pt had hallucinations over the weekend \"thinking a rat was in her bed. \" Pt was placed on trazodone out pt for hallucinations however she refused this 2 nights. She did take it last evening. We did discuss this today. Pt reports poor appetite. She reports \"I usually lay on the floor and eat in cozy blankets. Pt denies any SOB or CP. Chronic back pain. Continues with PTA regimen. Pt not always good about asking for meds. Nursing asking for tylenol scheduled.       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   -Ok to PT, OT to eval and treat.   -Continue PTA trazadone.     Acute on chronic back " pain  -hx of spinal stenosis.   -schedule tylenol 650 mg TID.   -pain assessment Q 4 hours.   -baclofen 5 mg TID prn.   -gabapentin 200 mg TID.     Failure to thrive in adult  -RD to consult.       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    Post Discharge Medication Reconciliation Status: discharge medications reconciled and changed, per note/orders  Current Outpatient Medications   Medication Sig Dispense Refill     acetaminophen (TYLENOL) 325 MG tablet Take 2 tablets (650 mg) by mouth every 4 hours as needed for mild pain or other (and adjunct with moderate or severe pain or per patient request). 30 tablet 1     aspirin 81 MG EC tablet Take 81 mg by mouth 2 times daily.       Baclofen (LIORESAL) 5 MG tablet Take 1 tablet (5 mg) by mouth 3 times daily as needed for muscle spasms. 20 tablet 0     carbidopa-levodopa (RYTARY) 48. MG CPCR per CR capsule Take 1 capsule by mouth 3 times daily. 270 capsule 1     carbidopa-levodopa (SINEMET)  MG tablet Take 1 tablet by mouth 3 times daily. 270 tablet 1     cyanocobalamin (VITAMIN B-12) 1000 MCG tablet Take 1 tablet  "(1,000 mcg) by mouth daily. 30 tablet 1     gabapentin (NEURONTIN) 100 MG capsule Take 2 capsules (200 mg) by mouth 3 times daily. 90 capsule 0     Lidocaine (LIDOCARE) 4 % Patch Place 1 patch over 12 hours onto the skin every 24 hours. To prevent lidocaine toxicity, patient should be patch free for 12 hrs daily. 14 patch 1     melatonin 3 MG tablet Take 6 mg by mouth at bedtime.       oxyCODONE (ROXICODONE) 5 MG tablet Take 0.5 tablets (2.5 mg) by mouth every 4 hours as needed for breakthrough pain. 30 tablet 0     senna-docusate (SENOKOT-S/PERICOLACE) 8.6-50 MG tablet Take 1 tablet by mouth 2 times daily as needed for constipation. 20 tablet 0     thiamine (B-1) 100 MG tablet Take 1 tablet (100 mg) by mouth daily. 90 tablet 0     traZODone (DESYREL) 100 MG tablet Take 1 tablet (100 mg) by mouth at bedtime. 30 tablet 1     vitamin C (ASCORBIC ACID) 500 MG tablet Take 500 mg by mouth daily.       Vitamin D3 (VITAMIN D) 10 MCG (400 UNIT) tablet Take 400 Units by mouth daily.       No current facility-administered medications for this visit.       REVIEW OF SYSTEMS:   10 point review of systems reviewed and pertinent positives in the HPI.     PHYSICAL EXAMINATION:  Physical Exam     Vital signs: /75   Pulse 68   Temp 98.1  F (36.7  C)   Resp 18   Ht 1.676 m (5' 6\")   Wt 50.9 kg (112 lb 4.8 oz)   LMP  (LMP Unknown)   SpO2 98%   BMI 18.13 kg/m    General: Awake, Alert, oriented x3, sitting up in wheelchair, appropriately, follows simple commands, conversant  HEENT:Pink conjunctiva, anicteric sclerae, dry oral mucosa  NECK: Supple  CVS:  S1  S2, without murmur or gallop.   LUNG: Clear to auscultation, No wheezes, rales or rhonci.  BACK: No kyphosis of the thoracic spine  ABDOMEN: Soft, thin, non tender to palpation, with positive bowel sounds  EXTREMITIES: moves both upper and lower extremities with generalized weakness, no pedal edema, no calf tenderness  SKIN: Warm and dry  NEUROLOGIC: No tremors. No " cogwheel rigidity.   PSYCHIATRIC: pleasant affect.       Labs:  All labs reviewed in the nursing home record and Epic   @  Lab Results   Component Value Date    WBC 7.8 02/19/2025     Lab Results   Component Value Date    RBC 4.50 02/19/2025     Lab Results   Component Value Date    HGB 14.6 02/19/2025     Lab Results   Component Value Date    HCT 42.8 02/19/2025     Lab Results   Component Value Date    MCV 95 02/19/2025     Lab Results   Component Value Date    MCH 32.4 02/19/2025     Lab Results   Component Value Date    MCHC 34.1 02/19/2025     Lab Results   Component Value Date    RDW 14.0 02/19/2025     Lab Results   Component Value Date     02/19/2025        @Last Comprehensive Metabolic Panel:  Sodium   Date Value Ref Range Status   02/19/2025 142 135 - 145 mmol/L Final     Potassium   Date Value Ref Range Status   02/19/2025 4.2 3.4 - 5.3 mmol/L Final   08/08/2022 4.0 3.5 - 5.0 mmol/L Final     Chloride   Date Value Ref Range Status   02/19/2025 104 98 - 107 mmol/L Final   08/08/2022 108 (H) 98 - 107 mmol/L Final     Carbon Dioxide (CO2)   Date Value Ref Range Status   02/19/2025 27 22 - 29 mmol/L Final   08/08/2022 26 22 - 31 mmol/L Final     Anion Gap   Date Value Ref Range Status   02/19/2025 11 7 - 15 mmol/L Final   08/08/2022 7 5 - 18 mmol/L Final     Glucose   Date Value Ref Range Status   02/19/2025 91 70 - 99 mg/dL Final   08/08/2022 98 70 - 125 mg/dL Final     Urea Nitrogen   Date Value Ref Range Status   02/19/2025 19.3 8.0 - 23.0 mg/dL Final   08/08/2022 21 8 - 22 mg/dL Final     Creatinine   Date Value Ref Range Status   02/19/2025 0.53 0.51 - 0.95 mg/dL Final     GFR Estimate   Date Value Ref Range Status   02/19/2025 >90 >60 mL/min/1.73m2 Final     Comment:     eGFR calculated using 2021 CKD-EPI equation.   07/03/2021 >60 >60 mL/min/1.73m2 Final     Calcium   Date Value Ref Range Status   02/19/2025 9.4 8.8 - 10.4 mg/dL Final       > 45 minutes spent preparing for this visit which  included reviewing hospital records, labs, imaging, meds, consults as well as face to face time with pt and collaborating with nursing.       This note has been dictated using voice recognition software. Any grammatical or context distortions are unintentional and inherent to the software    Electronically signed by: Yun Melo CNP       Sincerely,        Yun Melo NP    Electronically signed

## 2025-02-26 ENCOUNTER — TRANSITIONAL CARE UNIT VISIT (OUTPATIENT)
Dept: GERIATRICS | Facility: CLINIC | Age: 65
End: 2025-02-26
Payer: COMMERCIAL

## 2025-02-26 VITALS
WEIGHT: 118 LBS | RESPIRATION RATE: 18 BRPM | OXYGEN SATURATION: 97 % | DIASTOLIC BLOOD PRESSURE: 72 MMHG | BODY MASS INDEX: 18.96 KG/M2 | TEMPERATURE: 98.3 F | HEART RATE: 81 BPM | HEIGHT: 66 IN | SYSTOLIC BLOOD PRESSURE: 113 MMHG

## 2025-02-26 DIAGNOSIS — R44.3 HALLUCINATIONS: ICD-10-CM

## 2025-02-26 DIAGNOSIS — M54.9 ACUTE ON CHRONIC BACK PAIN: ICD-10-CM

## 2025-02-26 DIAGNOSIS — R53.1 GENERAL WEAKNESS: ICD-10-CM

## 2025-02-26 DIAGNOSIS — Z74.09 IMPAIRED MOBILITY AND ACTIVITIES OF DAILY LIVING: ICD-10-CM

## 2025-02-26 DIAGNOSIS — Z78.9 IMPAIRED MOBILITY AND ACTIVITIES OF DAILY LIVING: ICD-10-CM

## 2025-02-26 DIAGNOSIS — F06.8 PSYCHOSIS DUE TO PARKINSON'S DISEASE (H): ICD-10-CM

## 2025-02-26 DIAGNOSIS — G20.A1 PARKINSON'S DISEASE, UNSPECIFIED WHETHER DYSKINESIA PRESENT, UNSPECIFIED WHETHER MANIFESTATIONS FLUCTUATE (H): Primary | ICD-10-CM

## 2025-02-26 DIAGNOSIS — G20.A1 PSYCHOSIS DUE TO PARKINSON'S DISEASE (H): ICD-10-CM

## 2025-02-26 DIAGNOSIS — G89.29 ACUTE ON CHRONIC BACK PAIN: ICD-10-CM

## 2025-02-26 DIAGNOSIS — R62.7 FAILURE TO THRIVE IN ADULT: ICD-10-CM

## 2025-02-26 PROCEDURE — 99309 SBSQ NF CARE MODERATE MDM 30: CPT | Performed by: NURSE PRACTITIONER

## 2025-02-26 NOTE — LETTER
" 2/26/2025      Marlene Merida  [address]   [address]  [place] [address]        [facility]    Code Status:  FULL CODE   Visit Type:   Chief Complaint   Patient presents with     TCU Follow Up     Facility:  [facility] ([facility]) [[id number]]         HPI: Marlene Merida is a 64 year old female who I am seeing today for follow up on the TCU. Past medical history includes advanced Parkinson's disease with hx of hallucinations, spinal stenosis, chronic back pain with sciatica. Pt hospitalized for increased parkinson symptoms with bilateral paraparesis, deconditioning, hallucinations and FTT. Per the records pt was taking her parkinson meds on a different schedule than ordered. Pt seen by neurology and medications changed with improvement in symptoms. Acute on chronic back pain with sciatica. Gabapentin and baclofen added for pain.     Transitional Care Course: Today pt sitting up in wheelchair. Parkinson on carbidopa-levodopa. Pt with moderate tremors today. Medicare nurse present. Insurance has cut pt. Pt very anxious and tearful. Pt reporting she \"sleeps on 2 dining chairs pushed together at home because she can not get into her bed.\" Hx of hallucinations. No hallucinations over the last 3 days. Pt however today saying she would not like to take trazodone anymore. She feels it is \"giving her bad dreams.\" Pt was placed on this for hallucinations. We will need to monitor for this. Chronic back pain controlled with tylenol.       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   -Continues on therapy. Gait unsteady.    -Hold trazodone due to reports of bad dreams. Monitor for hallucinations.     Acute on chronic back pain  -hx of spinal stenosis. "   -schedule tylenol 650 mg TID.   -pain assessment Q 4 hours.   -baclofen 5 mg TID prn.   -gabapentin 200 mg TID.     Failure to thrive in adult  -RD to consult.       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    Current Outpatient Medications   Medication Sig Dispense Refill     acetaminophen (TYLENOL) 325 MG tablet Take 2 tablets (650 mg) by mouth every 4 hours as needed for mild pain or other (and adjunct with moderate or severe pain or per patient request). (Patient taking differently: Take 650 mg by mouth every 4 hours as needed for mild pain or other (and adjunct with moderate or severe pain or per patient request). Also give 650 mg TID not to exceed 4 gm in 24 hours.) 30 tablet 1     aspirin 81 MG EC tablet Take 81 mg by mouth 2 times daily.       Baclofen (LIORESAL) 5 MG tablet Take 1 tablet (5 mg) by mouth 3 times daily as needed for muscle spasms. 20 tablet 0     carbidopa-levodopa (RYTARY) 48. MG CPCR per CR capsule Take 1 capsule by mouth 3 times daily. 270 capsule 1     carbidopa-levodopa (SINEMET)  MG tablet Take 1 tablet by mouth 3 times daily.  "270 tablet 1     cyanocobalamin (VITAMIN B-12) 1000 MCG tablet Take 1 tablet (1,000 mcg) by mouth daily. 30 tablet 1     gabapentin (NEURONTIN) 100 MG capsule Take 2 capsules (200 mg) by mouth 3 times daily. 90 capsule 0     Lidocaine (LIDOCARE) 4 % Patch Place 1 patch over 12 hours onto the skin every 24 hours. To prevent lidocaine toxicity, patient should be patch free for 12 hrs daily. 14 patch 1     melatonin 3 MG tablet Take 6 mg by mouth at bedtime.       oxyCODONE (ROXICODONE) 5 MG tablet Take 0.5 tablets (2.5 mg) by mouth every 4 hours as needed for breakthrough pain. 30 tablet 0     senna-docusate (SENOKOT-S/PERICOLACE) 8.6-50 MG tablet Take 1 tablet by mouth 2 times daily as needed for constipation. 20 tablet 0     thiamine (B-1) 100 MG tablet Take 1 tablet (100 mg) by mouth daily. 90 tablet 0     vitamin C (ASCORBIC ACID) 500 MG tablet Take 500 mg by mouth daily.       Vitamin D3 (VITAMIN D) 10 MCG (400 UNIT) tablet Take 400 Units by mouth daily.       No current facility-administered medications for this visit.       REVIEW OF SYSTEMS:   10 point review of systems reviewed and pertinent positives in the HPI.     PHYSICAL EXAMINATION:  Physical Exam     Vital signs: /72   Pulse 81   Temp 98.3  F (36.8  C)   Resp 18   Ht 1.676 m (5' 6\")   Wt 53.5 kg (118 lb)   LMP  (LMP Unknown)   SpO2 97%   BMI 19.05 kg/m    General: Awake, Alert, sitting up in wheelchair, appropriately, follows simple commands, conversant  HEENT:Pink conjunctiva, anicteric sclerae, dry oral mucosa  NECK: Supple  CVS:  S1  S2, without murmur or gallop.   LUNG: Clear to auscultation, No wheezes, rales or rhonci.  BACK: No kyphosis of the thoracic spine  ABDOMEN: Soft, thin, non tender to palpation, with positive bowel sounds  EXTREMITIES: moves both upper and lower extremities with generalized weakness, no pedal edema, no calf tenderness  SKIN: Warm and dry  NEUROLOGIC: No tremors. No cogwheel rigidity.   PSYCHIATRIC: " Anxious, tearful.       Labs:  All labs reviewed in the nursing home record and Epic   @  Lab Results   Component Value Date    WBC 7.8 02/19/2025     Lab Results   Component Value Date    RBC 4.50 02/19/2025     Lab Results   Component Value Date    HGB 14.6 02/19/2025     Lab Results   Component Value Date    HCT 42.8 02/19/2025     Lab Results   Component Value Date    MCV 95 02/19/2025     Lab Results   Component Value Date    MCH 32.4 02/19/2025     Lab Results   Component Value Date    MCHC 34.1 02/19/2025     Lab Results   Component Value Date    RDW 14.0 02/19/2025     Lab Results   Component Value Date     02/19/2025        @Last Comprehensive Metabolic Panel:  Sodium   Date Value Ref Range Status   02/19/2025 142 135 - 145 mmol/L Final     Potassium   Date Value Ref Range Status   02/19/2025 4.2 3.4 - 5.3 mmol/L Final   08/08/2022 4.0 3.5 - 5.0 mmol/L Final     Chloride   Date Value Ref Range Status   02/19/2025 104 98 - 107 mmol/L Final   08/08/2022 108 (H) 98 - 107 mmol/L Final     Carbon Dioxide (CO2)   Date Value Ref Range Status   02/19/2025 27 22 - 29 mmol/L Final   08/08/2022 26 22 - 31 mmol/L Final     Anion Gap   Date Value Ref Range Status   02/19/2025 11 7 - 15 mmol/L Final   08/08/2022 7 5 - 18 mmol/L Final     Glucose   Date Value Ref Range Status   02/19/2025 91 70 - 99 mg/dL Final   08/08/2022 98 70 - 125 mg/dL Final     Urea Nitrogen   Date Value Ref Range Status   02/19/2025 19.3 8.0 - 23.0 mg/dL Final   08/08/2022 21 8 - 22 mg/dL Final     Creatinine   Date Value Ref Range Status   02/19/2025 0.53 0.51 - 0.95 mg/dL Final     GFR Estimate   Date Value Ref Range Status   02/19/2025 >90 >60 mL/min/1.73m2 Final     Comment:     eGFR calculated using 2021 CKD-EPI equation.   07/03/2021 >60 >60 mL/min/1.73m2 Final     Calcium   Date Value Ref Range Status   02/19/2025 9.4 8.8 - 10.4 mg/dL Final       This note has been dictated using voice recognition software. Any grammatical or context  distortions are unintentional and inherent to the software    Electronically signed by: Yun Melo CNP       Sincerely,        Yun Melo NP    Electronically signed

## 2025-02-27 NOTE — TELEPHONE ENCOUNTER
Date: February 27, 2025    Provider: Dr. Sandoval Schreiber MD     Provider/Other:     Reason for out-going call:  HOSPITAL/ ED FOLLOW UP       Detailed message: Spoke to patient to schedule post ED 1-2 week follow up with Dr. Nick. Per patient she's currently in a therapy home and has not thought about whether she wants to go through with scheduling or surgery with Dr. Nick. Patient will call back if she is interested in scheduling with Neurosurgery.

## 2025-02-27 NOTE — PROGRESS NOTES
"[facility] GERIATRIC SERVICES    Code Status:  FULL CODE   Visit Type:   Chief Complaint   Patient presents with    TCU Follow Up     Facility:  [facility] ([facility]) [[id number]]         HPI: Marlene Merida is a 64 year old female who I am seeing today for follow up on the TCU. Past medical history includes advanced Parkinson's disease with hx of hallucinations, spinal stenosis, chronic back pain with sciatica. Pt hospitalized for increased parkinson symptoms with bilateral paraparesis, deconditioning, hallucinations and FTT. Per the records pt was taking her parkinson meds on a different schedule than ordered. Pt seen by neurology and medications changed with improvement in symptoms. Acute on chronic back pain with sciatica. Gabapentin and baclofen added for pain.     Transitional Care Course: Today pt sitting up in wheelchair. Parkinson on carbidopa-levodopa. Pt with moderate tremors today. Medicare nurse present. Insurance has cut pt. Pt very anxious and tearful. Pt reporting she \"sleeps on 2 dining chairs pushed together at home because she can not get into her bed.\" Hx of hallucinations. No hallucinations over the last 3 days. Pt however today saying she would not like to take trazodone anymore. She feels it is \"giving her bad dreams.\" Pt was placed on this for hallucinations. We will need to monitor for this. Chronic back pain controlled with tylenol.       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   -Continues on therapy. Gait unsteady.    -Hold trazodone due to reports of bad dreams. Monitor for hallucinations.     Acute on chronic back pain  -hx of spinal stenosis.   -schedule tylenol 650 mg TID.   -pain assessment Q 4 hours.   -baclofen 5 mg TID prn. "   -gabapentin 200 mg TID.     Failure to thrive in adult  -RD to consult.       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    Current Outpatient Medications   Medication Sig Dispense Refill    acetaminophen (TYLENOL) 325 MG tablet Take 2 tablets (650 mg) by mouth every 4 hours as needed for mild pain or other (and adjunct with moderate or severe pain or per patient request). (Patient taking differently: Take 650 mg by mouth every 4 hours as needed for mild pain or other (and adjunct with moderate or severe pain or per patient request). Also give 650 mg TID not to exceed 4 gm in 24 hours.) 30 tablet 1    aspirin 81 MG EC tablet Take 81 mg by mouth 2 times daily.      Baclofen (LIORESAL) 5 MG tablet Take 1 tablet (5 mg) by mouth 3 times daily as needed for muscle spasms. 20 tablet 0    carbidopa-levodopa (RYTARY) 48. MG CPCR per CR capsule Take 1 capsule by mouth 3 times daily. 270 capsule 1    carbidopa-levodopa (SINEMET)  MG tablet Take 1 tablet by mouth 3 times daily. 270 tablet 1    cyanocobalamin (VITAMIN B-12) 1000 MCG tablet Take 1 tablet (1,000 mcg) by mouth daily. 30  "tablet 1    gabapentin (NEURONTIN) 100 MG capsule Take 2 capsules (200 mg) by mouth 3 times daily. 90 capsule 0    Lidocaine (LIDOCARE) 4 % Patch Place 1 patch over 12 hours onto the skin every 24 hours. To prevent lidocaine toxicity, patient should be patch free for 12 hrs daily. 14 patch 1    melatonin 3 MG tablet Take 6 mg by mouth at bedtime.      oxyCODONE (ROXICODONE) 5 MG tablet Take 0.5 tablets (2.5 mg) by mouth every 4 hours as needed for breakthrough pain. 30 tablet 0    senna-docusate (SENOKOT-S/PERICOLACE) 8.6-50 MG tablet Take 1 tablet by mouth 2 times daily as needed for constipation. 20 tablet 0    thiamine (B-1) 100 MG tablet Take 1 tablet (100 mg) by mouth daily. 90 tablet 0    vitamin C (ASCORBIC ACID) 500 MG tablet Take 500 mg by mouth daily.      Vitamin D3 (VITAMIN D) 10 MCG (400 UNIT) tablet Take 400 Units by mouth daily.       No current facility-administered medications for this visit.       REVIEW OF SYSTEMS:   10 point review of systems reviewed and pertinent positives in the HPI.     PHYSICAL EXAMINATION:  Physical Exam     Vital signs: /72   Pulse 81   Temp 98.3  F (36.8  C)   Resp 18   Ht 1.676 m (5' 6\")   Wt 53.5 kg (118 lb)   LMP  (LMP Unknown)   SpO2 97%   BMI 19.05 kg/m    General: Awake, Alert, sitting up in wheelchair, appropriately, follows simple commands, conversant  HEENT:Pink conjunctiva, anicteric sclerae, dry oral mucosa  NECK: Supple  CVS:  S1  S2, without murmur or gallop.   LUNG: Clear to auscultation, No wheezes, rales or rhonci.  BACK: No kyphosis of the thoracic spine  ABDOMEN: Soft, thin, non tender to palpation, with positive bowel sounds  EXTREMITIES: moves both upper and lower extremities with generalized weakness, no pedal edema, no calf tenderness  SKIN: Warm and dry  NEUROLOGIC: No tremors. No cogwheel rigidity.   PSYCHIATRIC: Anxious, tearful.       Labs:  All labs reviewed in the nursing home record and [facility]   @  Lab Results   Component Value " Date    WBC 7.8 02/19/2025     Lab Results   Component Value Date    RBC 4.50 02/19/2025     Lab Results   Component Value Date    HGB 14.6 02/19/2025     Lab Results   Component Value Date    HCT 42.8 02/19/2025     Lab Results   Component Value Date    MCV 95 02/19/2025     Lab Results   Component Value Date    MCH 32.4 02/19/2025     Lab Results   Component Value Date    MCHC 34.1 02/19/2025     Lab Results   Component Value Date    RDW 14.0 02/19/2025     Lab Results   Component Value Date     02/19/2025        @Last Comprehensive Metabolic Panel:  Sodium   Date Value Ref Range Status   02/19/2025 142 135 - 145 mmol/L Final     Potassium   Date Value Ref Range Status   02/19/2025 4.2 3.4 - 5.3 mmol/L Final   08/08/2022 4.0 3.5 - 5.0 mmol/L Final     Chloride   Date Value Ref Range Status   02/19/2025 104 98 - 107 mmol/L Final   08/08/2022 108 (H) 98 - 107 mmol/L Final     Carbon Dioxide (CO2)   Date Value Ref Range Status   02/19/2025 27 22 - 29 mmol/L Final   08/08/2022 26 22 - 31 mmol/L Final     Anion Gap   Date Value Ref Range Status   02/19/2025 11 7 - 15 mmol/L Final   08/08/2022 7 5 - 18 mmol/L Final     Glucose   Date Value Ref Range Status   02/19/2025 91 70 - 99 mg/dL Final   08/08/2022 98 70 - 125 mg/dL Final     Urea Nitrogen   Date Value Ref Range Status   02/19/2025 19.3 8.0 - 23.0 mg/dL Final   08/08/2022 21 8 - 22 mg/dL Final     Creatinine   Date Value Ref Range Status   02/19/2025 0.53 0.51 - 0.95 mg/dL Final     GFR Estimate   Date Value Ref Range Status   02/19/2025 >90 >60 mL/min/1.73m2 Final     Comment:     eGFR calculated using 2021 CKD-EPI equation.   07/03/2021 >60 >60 mL/min/1.73m2 Final     Calcium   Date Value Ref Range Status   02/19/2025 9.4 8.8 - 10.4 mg/dL Final       This note has been dictated using voice recognition software. Any grammatical or context distortions are unintentional and inherent to the software    Electronically signed by: Yun Melo CNP

## 2025-03-04 ENCOUNTER — DOCUMENTATION ONLY (OUTPATIENT)
Dept: GERIATRICS | Facility: CLINIC | Age: 65
End: 2025-03-04
Payer: COMMERCIAL

## 2025-03-18 ENCOUNTER — NURSING HOME VISIT (OUTPATIENT)
Dept: GERIATRICS | Facility: CLINIC | Age: 65
End: 2025-03-18
Payer: COMMERCIAL

## 2025-03-18 ENCOUNTER — PATIENT OUTREACH (OUTPATIENT)
Dept: CARE COORDINATION | Facility: CLINIC | Age: 65
End: 2025-03-18

## 2025-03-18 VITALS
HEART RATE: 87 BPM | RESPIRATION RATE: 20 BRPM | DIASTOLIC BLOOD PRESSURE: 75 MMHG | TEMPERATURE: 98.2 F | OXYGEN SATURATION: 96 % | BODY MASS INDEX: 19.37 KG/M2 | WEIGHT: 120 LBS | SYSTOLIC BLOOD PRESSURE: 121 MMHG

## 2025-03-18 DIAGNOSIS — M54.41 CHRONIC BILATERAL LOW BACK PAIN WITH BILATERAL SCIATICA: ICD-10-CM

## 2025-03-18 DIAGNOSIS — Z74.09 IMPAIRED MOBILITY AND ACTIVITIES OF DAILY LIVING: ICD-10-CM

## 2025-03-18 DIAGNOSIS — G89.29 CHRONIC BILATERAL LOW BACK PAIN WITH BILATERAL SCIATICA: ICD-10-CM

## 2025-03-18 DIAGNOSIS — Z78.9 IMPAIRED MOBILITY AND ACTIVITIES OF DAILY LIVING: ICD-10-CM

## 2025-03-18 DIAGNOSIS — G20.A1 PARKINSON'S DISEASE, UNSPECIFIED WHETHER DYSKINESIA PRESENT, UNSPECIFIED WHETHER MANIFESTATIONS FLUCTUATE (H): Primary | ICD-10-CM

## 2025-03-18 DIAGNOSIS — F06.8 PSYCHOSIS DUE TO PARKINSON'S DISEASE (H): ICD-10-CM

## 2025-03-18 DIAGNOSIS — G20.A1 PSYCHOSIS DUE TO PARKINSON'S DISEASE (H): ICD-10-CM

## 2025-03-18 DIAGNOSIS — M25.551 PAIN IN JOINT INVOLVING RIGHT PELVIC REGION AND THIGH: ICD-10-CM

## 2025-03-18 DIAGNOSIS — R62.7 FAILURE TO THRIVE IN ADULT: ICD-10-CM

## 2025-03-18 DIAGNOSIS — M54.42 CHRONIC BILATERAL LOW BACK PAIN WITH BILATERAL SCIATICA: ICD-10-CM

## 2025-03-18 DIAGNOSIS — R63.6 UNDERWEIGHT: ICD-10-CM

## 2025-03-18 PROCEDURE — 99309 SBSQ NF CARE MODERATE MDM 30: CPT | Performed by: NURSE PRACTITIONER

## 2025-03-18 NOTE — LETTER
3/18/2025      Marlene  Demike  [address]   [address]  [place] [id number]        No notes on file      Sincerely,        PARRISH Aly CNP    Electronically signed

## 2025-03-18 NOTE — PROGRESS NOTES
[facility] GERIATRICS  ACUTE/EPISODIC VISIT    [facility] Medical Record Number:  [id number]  Place of Service where encounter took place:  [facility] () [[id number]]    Chief Complaint   Patient presents with    RECHECK       HPI:    Marlene Merida is a 64 year old  (1960), who is being seen today for an episodic care visit.  HPI information obtained from: {FGS HPI:743980}.    Today's concern is:      ALLERGIES:  No Known Allergies     MEDICATIONS:  Post Discharge Medication Reconciliation Status: {O Med Rec (Provider):451728}. ***    Current Outpatient Medications   Medication Sig Dispense Refill    acetaminophen (TYLENOL) 325 MG tablet Take 2 tablets (650 mg) by mouth every 4 hours as needed for mild pain or other (and adjunct with moderate or severe pain or per patient request). (Patient taking differently: Take 650 mg by mouth every 4 hours as needed for mild pain or other (and adjunct with moderate or severe pain or per patient request). Also give 650 mg TID not to exceed 4 gm in 24 hours.) 30 tablet 1    aspirin 81 MG EC tablet Take 81 mg by mouth 2 times daily.      Baclofen (LIORESAL) 5 MG tablet Take 1 tablet (5 mg) by mouth 3 times daily as needed for muscle spasms. 20 tablet 0    carbidopa-levodopa (RYTARY) 48. MG CPCR per CR capsule Take 1 capsule by mouth 3 times daily. 270 capsule 1    carbidopa-levodopa (SINEMET)  MG tablet Take 1 tablet by mouth 3 times daily. 270 tablet 1    cyanocobalamin (VITAMIN B-12) 1000 MCG tablet Take 1 tablet (1,000 mcg) by mouth daily. 30 tablet 1    gabapentin (NEURONTIN) 100 MG capsule Take 2 capsules (200 mg) by mouth 3 times daily. 90 capsule 0    Lidocaine (LIDOCARE) 4 % Patch Place 1 patch over 12 hours onto the skin every 24 hours. To prevent lidocaine toxicity, patient should be patch free for 12 hrs daily. 14 patch 1    melatonin 3 MG tablet Take 6 mg by mouth at bedtime.      oxyCODONE (ROXICODONE) 5 MG tablet Take 0.5  tablets (2.5 mg) by mouth every 4 hours as needed for breakthrough pain. 30 tablet 0    senna-docusate (SENOKOT-S/PERICOLACE) 8.6-50 MG tablet Take 1 tablet by mouth 2 times daily as needed for constipation. 20 tablet 0    thiamine (B-1) 100 MG tablet Take 1 tablet (100 mg) by mouth daily. 90 tablet 0    vitamin C (ASCORBIC ACID) 500 MG tablet Take 500 mg by mouth daily.      Vitamin D3 (VITAMIN D) 10 MCG (400 UNIT) tablet Take 400 Units by mouth daily.       Medications reviewed:  Medications reconciled to facility chart and changes were made to reflect current medications as identified as above med list. Below are the changes that were made:   Medications stopped since last EPIC medication reconciliation:   There are no discontinued medications.    Medications started since last [facility] medication reconciliation:  No orders of the defined types were placed in this encounter.    ***    REVIEW OF SYSTEMS:  4 point ROS neg other than the symptoms noted above in the HPI.***  Unable to be obtained due to cognitive impairment or aphasia.   ROS    PHYSICAL EXAM:  /75   Pulse 87   Temp 98.2  F (36.8  C)   Resp 20   Wt 54.4 kg (120 lb)   LMP  (LMP Unknown)   SpO2 96%   BMI 19.37 kg/m    Physical Exam      ASSESSMENT / PLAN:  {FGS DX:335953}    Orders:  ***    Electronically signed by  Hansa Mendoza         Take 1 tablet by mouth 2 times daily as needed for constipation. 20 tablet 0    thiamine (B-1) 100 MG tablet Take 1 tablet (100 mg) by mouth daily. 90 tablet 0    vitamin C (ASCORBIC ACID) 500 MG tablet Take 500 mg by mouth daily.      Vitamin D3 (VITAMIN D) 10 MCG (400 UNIT) tablet Take 400 Units by mouth daily.         REVIEW OF SYSTEMS:  4 point ROS neg other than the symptoms noted above in the HPI.  No chest pain, no shortness of breath, always has a longstanding history of not sleeping well.      PHYSICAL EXAM:  /75   Pulse 87   Temp 98.2  F (36.8  C)   Resp 20   Wt 54.4 kg (120 lb)   LMP  (LMP Unknown)   SpO2 96%   BMI 19.37 kg/m    Slender average height female who is ambulating without a device.  She used to have therapies but now is done with them and has exercises she can continue with.  Does not wear any corrective lenses, makes eye contact during conversation, sclera's are clear, EOMs intact  Able to hear normal tones of conversation  Speech is clear, no trouble swallowing.  Lungs are clear to auscultation  Heart rate is regular and strong.  No edema in lower extremities  Skin is pink, dry, and intact.      Component      Latest Ref Rn 2/19/2025  8:08 AM   Sodium      135 - 145 mmol/L 142    Potassium      3.4 - 5.3 mmol/L 4.2    Chloride      98 - 107 mmol/L 104    Carbon Dioxide (CO2)      22 - 29 mmol/L 27    Anion Gap      7 - 15 mmol/L 11    Urea Nitrogen      8.0 - 23.0 mg/dL 19.3    Creatinine      0.51 - 0.95 mg/dL 0.53    GFR Estimate      >60 mL/min/1.73m2 >90    Calcium      8.8 - 10.4 mg/dL 9.4    Glucose      70 - 99 mg/dL 91    WBC      4.0 - 11.0 10e3/uL 7.8    RBC Count      3.80 - 5.20 10e6/uL 4.50    Hemoglobin      11.7 - 15.7 g/dL 14.6    Hematocrit      35.0 - 47.0 % 42.8    MCV      78 - 100 fL 95    MCH      26.5 - 33.0 pg 32.4    MCHC      31.5 - 36.5 g/dL 34.1    RDW      10.0 - 15.0 % 14.0    Platelet Count      150 - 450 10e3/uL 351        Lab Results   Component  Value Date    TSH 2.98 01/25/2025       ASSESSMENT / PLAN:  (G20.A1) Parkinson's disease, unspecified whether dyskinesia present, unspecified whether manifestations fluctuate (H)  (primary encounter diagnosis)  (G20.A1,  F06.8) 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  She acknowledges that living in a room with someone else is nervous is not good as they compete for attention.  Does not take any psych medications as for now is easily redirected.   Remains on Sinemet 25/100 1 tab 3x a day and then Rytary 48.75-195mg 1 tab TID at same time as the short acting Sinemet.  As noted she is followed by neurology.  No changes at this time.  People are getting to know her and need to work on getting her medications to her in a more timely fashion.  Do feel that that will be accomplished at some point    (R62.7) Failure to thrive in adult  (R63.6) Underweight -- BMI 17.8  Comment: Hopefully with Marlene staying in long-term, she can either maintain her weight or hopefully gain a little bit of weight.  Monthly weights are typically done.  She is on a regular diet with thin liquids and has order for Ensure Plus 8 ounces twice a day.  Continue to follow her weights    (M54.42,  M54.41,  G89.29) Chronic bilateral low back pain with bilateral sciatica  (M25.551) Pain in joint involving right pelvic region and thigh  Comment: Does have chronic pain in which Tylenol 325 mg 2 tablets 3 times a day is given, lidocaine patches put on for 12 hours on the day, gabapentin 200 mg 3 times a day is utilized.  For now we will continue to monitor effectiveness of the pain medicine.  Goal is not to over sedate her that she cannot function and so fine-line with her Parkinson's disease    Orders:  No new orders today    Electronically signed by  PARRISH Aly CNP

## 2025-03-18 NOTE — LETTER
3/18/2025      Marlene Merida  [address]   [address]  [facility] [address]        [facility] GERIATRICS  ACUTE/EPISODIC VISIT    [facility] Medical Record Number:  [id number]  Place of Service where encounter took place:  [facility] () [[id number]]    Chief Complaint   Patient presents with     RECHECK       HPI:    Marlene Merida is a 64 year old  (1960), who is being seen today for an episodic care visit.  HPI information obtained from: facility chart records, facility staff, patient report, [facility] chart review, and Care Everywhere [facility] chart review.    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 earlier this month.  Unable to live in the community herself but states today she hopes to move to a [facility] setting.    Reviewing nursing notes and appears that she has hallucinations and will leave messages for SW often due to her anxiety levels while in TCU.      Met Marlene in a common area beyond the dining room where it was quiet and no other people were around.  [place] then she could at least talk without thinking people were hearing her.  She likes her roommate but she does not like her roommate as they are very similar with anxiety and feet off of each other.   on the long-term care is very aware of this and so looking for a different room that could  accommodate Marlene better.    Marlene is a 64-year-old female with chronic conditions that include Parkinson's disease without dyskinesia and without mention of fluctuations, cervical spinal stenosis, chronic low back pain with bilateral sciatica, hallucinations with her Parkinson's, failure to thrive, and sleep disturbances.    Very pleasant today and answered questions for this nurse practitioner as reviewing her medications.  Right now her biggest concern is that she takes her medications in a timely fashion to avoid having tremors with her Parkinson's.  Marlene is followed by neurology and thinks she might have an upcoming appointment on the 24th.    ALLERGIES:  No Known Allergies     MEDICATIONS:  Post Discharge Medication Reconciliation Status: Medications reconciled today as she moved over from [facility] recently.     Current Outpatient Medications   Medication Sig Dispense Refill     acetaminophen (TYLENOL) 325 MG tablet 650 mg TID and 650mg every 4 hours as needed, not to exceed 4 gm in 24 hours.       aspirin 81 MG EC tablet Take 81 mg by mouth 2 times daily.       Baclofen (LIORESAL) 5 MG tablet Take 1 tablet (5 mg) by mouth 3 times daily as needed for muscle spasms. 20 tablet 0     carbidopa-levodopa (RYTARY) 48. MG CPCR per CR capsule Take 1 capsule by mouth 3 times daily. 270 capsule 1     carbidopa-levodopa (SINEMET)  MG tablet Take 1 tablet by mouth 3 times daily. 270 tablet 1     cyanocobalamin (VITAMIN B-12) 1000 MCG tablet Take 1 tablet (1,000 mcg) by mouth daily. 30 tablet 1     gabapentin (NEURONTIN) 100 MG capsule Take 2 capsules (200 mg) by mouth 3 times daily. 90 capsule 0     Lidocaine (LIDOCARE) 4 % Patch Place 1 patch over 12 hours onto the skin every 24 hours. To prevent lidocaine toxicity, patient should be patch free for 12 hrs daily. 14 patch 1     melatonin 3 MG tablet Take 6 mg by mouth at bedtime.       oxyCODONE (ROXICODONE) 5 MG tablet Take 0.5 tablets (2.5 mg) by mouth every 4  hours as needed for breakthrough pain. 30 tablet 0     senna-docusate (SENOKOT-S/PERICOLACE) 8.6-50 MG tablet Take 1 tablet by mouth 2 times daily as needed for constipation. 20 tablet 0     thiamine (B-1) 100 MG tablet Take 1 tablet (100 mg) by mouth daily. 90 tablet 0     vitamin C (ASCORBIC ACID) 500 MG tablet Take 500 mg by mouth daily.       Vitamin D3 (VITAMIN D) 10 MCG (400 UNIT) tablet Take 400 Units by mouth daily.         REVIEW OF SYSTEMS:  4 point ROS neg other than the symptoms noted above in the HPI.  No chest pain, no shortness of breath, always has a longstanding history of not sleeping well.      PHYSICAL EXAM:  /75   Pulse 87   Temp 98.2  F (36.8  C)   Resp 20   Wt 54.4 kg (120 lb)   LMP  (LMP Unknown)   SpO2 96%   BMI 19.37 kg/m    Slender average height female who is ambulating without a device.  She used to have therapies but now is done with them and has exercises she can continue with.  Does not wear any corrective lenses, makes eye contact during conversation, sclera's are clear, EOMs intact  Able to hear normal tones of conversation  Speech is clear, no trouble swallowing.  Lungs are clear to auscultation  Heart rate is regular and strong.  No edema in lower extremities  Skin is pink, dry, and intact.      Component      Latest Ref [facility] 2/19/2025  8:08 AM   Sodium      135 - 145 mmol/L 142    Potassium      3.4 - 5.3 mmol/L 4.2    Chloride      98 - 107 mmol/L 104    Carbon Dioxide (CO2)      22 - 29 mmol/L 27    Anion Gap      7 - 15 mmol/L 11    Urea Nitrogen      8.0 - 23.0 mg/dL 19.3    Creatinine      0.51 - 0.95 mg/dL 0.53    GFR Estimate      >60 mL/min/1.73m2 >90    Calcium      8.8 - 10.4 mg/dL 9.4    Glucose      70 - 99 mg/dL 91    WBC      4.0 - 11.0 10e3/uL 7.8    RBC Count      3.80 - 5.20 10e6/uL 4.50    Hemoglobin      11.7 - 15.7 g/dL 14.6    Hematocrit      35.0 - 47.0 % 42.8    MCV      78 - 100 fL 95    MCH      26.5 - 33.0 pg 32.4    MCHC      31.5 - 36.5  g/dL 34.1    RDW      10.0 - 15.0 % 14.0    Platelet Count      150 - 450 10e3/uL 351        Lab Results   Component Value Date    TSH 2.98 01/25/2025       ASSESSMENT / PLAN:  (G20.A1) Parkinson's disease, unspecified whether dyskinesia present, unspecified whether manifestations fluctuate (H)  (primary encounter diagnosis)  (G20.A1,  F06.8) 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  She acknowledges that living in a room with someone else is nervous is not good as they compete for attention.  Does not take any psych medications as for now is easily redirected.   Remains on Sinemet 25/100 1 tab 3x a day and then Rytary 48.75-195mg 1 tab TID at same time as the short acting Sinemet.  As noted she is followed by neurology.  No changes at this time.  People are getting to know her and need to work on getting her medications to her in a more timely fashion.  Do feel that that will be accomplished at some point    (R62.7) Failure to thrive in adult  (R63.6) Underweight -- BMI 17.8  Comment: Hopefully with Marlene staying in long-term, she can either maintain her weight or hopefully gain a little bit of weight.  Monthly weights are typically done.  She is on a regular diet with thin liquids and has order for Ensure Plus 8 ounces twice a day.  Continue to follow her weights    (M54.42,  M54.41,  G89.29) Chronic bilateral low back pain with bilateral sciatica  (M25.551) Pain in joint involving right pelvic region and thigh  Comment: Does have chronic pain in which Tylenol 325 mg 2 tablets 3 times a day is given, lidocaine patches put on for 12 hours on the day, gabapentin 200 mg 3 times a day is utilized.  For now we will continue to monitor effectiveness of the pain medicine.  Goal is not to over sedate her that she cannot function and so fine-line with her Parkinson's disease    Orders:  No new orders  today    Electronically signed by  PARRISH Aly CNP            Sincerely,        PARRISH Aly CNP    Electronically signed

## 2025-04-22 NOTE — PROGRESS NOTES
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  Generalized weakness    Plan     Pt is admitted to long-term care  Patient was recently in the hospital with increased weakness as well as deconditioning with bilateral paraparesis  Noted to have spinal stenosis of her cervical spine  Neurosurgery consulted and requested surgical decompression but patient is adamant that she will not have any surgeries  Currently wheelchair-bound  Pain is controlled with her current regimen  Reports improvement with therapy  Parkinson's is controlled and at baseline.  Neurology notes reviewed and they have recommended continuing Sinemet  She reports 2 falls in the last 3 months  Concerned about her medications  Also reports chronic insomnia and high doses of medication.  Discharge plan is to move to an assisted living apartment reports that family is looking into placement for her  Recheck labs as needed  Continue with her PT OT    History     Patient is a very pleasant 65 year old female who is admitted to LTC  Patient has a known history of Parkinson's and continues on her Sinemet.  She was recently admitted to the hospital with back pain and paraparesis bilateral lower extremity.  Noted to have significant spinal stenosis.  Pain optimized using gabapentin and baclofen.  Neurosurgery recommended decompression surgery but patient wants to defer it for now  Was in TCU and currently moved to long-term care.      Past Medical & Surgical History     PAST MEDICAL HISTORY:   Past Medical History:   Diagnosis Date    Parkinson's disease (H) 2019      PAST SURGICAL HISTORY:    has a past surgical history that includes Breast surgery and GYN surgery.      Past Social History     Reviewed,  reports that she has never smoked. She has never been exposed to tobacco smoke. She has never used smokeless tobacco. She reports current alcohol use. She reports that she does not use drugs.    Family History     Reviewed, and family history includes Alzheimer Disease in her mother; Arthritis in her father; Diabetes in her brother and paternal grandmother; Hypertension in her father and mother.    Medication List     Current Outpatient Medications   Medication Sig Dispense Refill    acetaminophen (TYLENOL) 325 MG tablet Take 2 tablets (650 mg) by mouth every 4 hours as needed for mild pain or other (and adjunct with moderate or severe pain or per patient request). (Patient taking differently: Take 650 mg by mouth every 4 hours as needed for mild pain or other (and adjunct with moderate or severe pain or per patient request). Also give 650 mg TID not to exceed 4 gm in 24 hours.) 30 tablet 1    aspirin 81 MG EC tablet Take 81 mg by mouth 2 times daily.      Baclofen (LIORESAL) 5 MG tablet Take 1 tablet (5 mg) by mouth 3 times daily as needed for muscle spasms. 20 tablet 0    carbidopa-levodopa (RYTARY) 48. MG CPCR per CR capsule Take 1 capsule by mouth 3 times daily. 270 capsule 1    carbidopa-levodopa (SINEMET)  MG tablet Take 1 tablet by mouth 3 times daily. 270 tablet 1    cyanocobalamin (VITAMIN B-12) 1000 MCG tablet Take 1 tablet (1,000 mcg) by mouth daily. 30 tablet 1    gabapentin (NEURONTIN) 100 MG capsule Take 2 capsules (200 mg) by mouth 3 times daily. 90 capsule 0    Lidocaine (LIDOCARE) 4 % Patch Place 1 patch over 12 hours onto the skin every 24 hours. To prevent lidocaine toxicity, patient should be patch free for 12 hrs daily. 14 patch 1    melatonin 3 MG tablet Take 6 mg by mouth at bedtime.      oxyCODONE (ROXICODONE) 5 MG tablet Take 0.5 tablets (2.5 mg) by mouth every 4  "hours as needed for breakthrough pain. 30 tablet 0    senna-docusate (SENOKOT-S/PERICOLACE) 8.6-50 MG tablet Take 1 tablet by mouth 2 times daily as needed for constipation. 20 tablet 0    thiamine (B-1) 100 MG tablet Take 1 tablet (100 mg) by mouth daily. 90 tablet 0    vitamin C (ASCORBIC ACID) 500 MG tablet Take 500 mg by mouth daily.      Vitamin D3 (VITAMIN D) 10 MCG (400 UNIT) tablet Take 400 Units by mouth daily.       No current facility-administered medications for this visit.      MED REC REQUIRED  Post Medication Reconciliation Status:        Allergies     No Known Allergies    Review of Systems   A comprehensive review of 14 systems was done. Pertinent findings noted here and in history of present illness. All the rest negative.  Constitutional: Negative.  Negative for fever, chills, she has  activity change, appetite change and fatigue.   HENT: Negative for congestion and facial swelling.    Eyes: Negative for photophobia, redness and visual disturbance.   Respiratory: Negative for cough and chest tightness.    Cardiovascular: Negative for chest pain, palpitations and leg swelling.   Gastrointestinal: Negative for nausea, diarrhea, constipation, blood in stool and abdominal distention.   Genitourinary: Negative.    Musculoskeletal: Negative.  Reporting 2 falls in the last couple of months  Currently wheelchair-bound  Has leg weakness   Skin: Negative.    Neurological: Negative for dizziness, tremors, syncope, weakness, light-headedness and headaches.   Hematological: Does not bruise/bleed easily.   Psychiatric/Behavioral: Negative.  has chronic insomnia and reports she does not sleep more than 2 hours a night      Physical Exam   BP (!) 152/76   Pulse 76   Temp 98.7  F (37.1  C)   Resp 19   Ht 1.676 m (5' 6\")   Wt 54.4 kg (120 lb)   LMP  (LMP Unknown)   SpO2 98%   BMI 19.37 kg/m       Constitutional: Oriented to person, place, and time and appears well-developed.   HEENT:  Normocephalic and " atraumatic.  Eyes: Conjunctivae and EOM are normal. Pupils are equal, round, and reactive to light. No discharge.  No scleral icterus. Nose normal. Mouth/Throat: Oropharynx is clear and moist. No oropharyngeal exudate.    NECK: Normal range of motion. Neck supple. No JVD present. No tracheal deviation present. No thyromegaly present.   CARDIOVASCULAR: Normal rate, regular rhythm and intact distal pulses.  Exam reveals no gallop and no friction rub.  Systolic murmur present.  PULMONARY: Effort normal and breath sounds normal. No respiratory distress.No Wheezing or rales.  ABDOMEN: Soft. Bowel sounds are normal. No distension and no mass.  There is no tenderness. There is no rebound and no guarding. No HSM.  MUSCULOSKELETAL: Normal range of motion. Mild kyphosis, no tenderness.  LYMPH NODES: Has no cervical, supraclavicular, axillary and groin adenopathy.   NEUROLOGICAL: Alert and oriented to person, place, and time. No cranial nerve deficit.  Normal muscle tone. Coordination normal.   Tremors noted both upper extremities  GENITOURINARY: Deferred exam.  SKIN: Skin is warm and dry. No rash noted. No erythema. No pallor.   EXTREMITIES: No cyanosis, no clubbing, no edema. No Deformity.  PSYCHIATRIC: Normal mood, affect and behavior.  Chronic insomnia reported      Lab Results     Last Comprehensive Metabolic Panel:  Lab Results   Component Value Date     02/19/2025    POTASSIUM 4.2 02/19/2025    CHLORIDE 104 02/19/2025    CO2 27 02/19/2025    ANIONGAP 11 02/19/2025    GLC 91 02/19/2025    BUN 19.3 02/19/2025    CR 0.53 02/19/2025    GFRESTIMATED >90 02/19/2025    TITO 9.4 02/19/2025            Electronically signed by    Jeannie Mathew MD

## 2025-04-23 ENCOUNTER — TELEPHONE (OUTPATIENT)
Dept: GERIATRICS | Facility: CLINIC | Age: 65
End: 2025-04-23

## 2025-04-23 ENCOUNTER — NURSING HOME VISIT (OUTPATIENT)
Dept: GERIATRICS | Facility: CLINIC | Age: 65
End: 2025-04-23
Payer: COMMERCIAL

## 2025-04-23 VITALS
BODY MASS INDEX: 19.29 KG/M2 | HEIGHT: 66 IN | RESPIRATION RATE: 19 BRPM | HEART RATE: 76 BPM | WEIGHT: 120 LBS | SYSTOLIC BLOOD PRESSURE: 152 MMHG | OXYGEN SATURATION: 98 % | TEMPERATURE: 98.7 F | DIASTOLIC BLOOD PRESSURE: 76 MMHG

## 2025-04-23 DIAGNOSIS — Z78.9 IMPAIRED MOBILITY AND ACTIVITIES OF DAILY LIVING: ICD-10-CM

## 2025-04-23 DIAGNOSIS — Z74.09 IMPAIRED MOBILITY AND ACTIVITIES OF DAILY LIVING: ICD-10-CM

## 2025-04-23 DIAGNOSIS — R62.7 FAILURE TO THRIVE IN ADULT: ICD-10-CM

## 2025-04-23 DIAGNOSIS — R53.1 GENERAL WEAKNESS: ICD-10-CM

## 2025-04-23 DIAGNOSIS — G20.A1 PARKINSON'S DISEASE, UNSPECIFIED WHETHER DYSKINESIA PRESENT, UNSPECIFIED WHETHER MANIFESTATIONS FLUCTUATE (H): Primary | ICD-10-CM

## 2025-04-23 PROCEDURE — 99305 1ST NF CARE MODERATE MDM 35: CPT | Performed by: FAMILY MEDICINE

## 2025-04-23 NOTE — LETTER
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  Generalized weakness    Plan     Pt is admitted to long-term care  Patient was recently in the hospital with increased weakness as well as deconditioning with bilateral paraparesis  Noted to have spinal stenosis of her cervical spine  Neurosurgery consulted and requested surgical decompression but patient is adamant that she will not have any surgeries  Currently wheelchair-bound  Pain is controlled with her current regimen  Reports improvement with therapy  Parkinson's is controlled and at baseline.  Neurology notes reviewed and they have recommended continuing Sinemet  She reports 2 falls in the last 3 months  Concerned about her medications  Also reports chronic insomnia and high doses of medication.  Discharge plan is to move to an assisted living apartment reports that family is looking into placement for her  Recheck labs as needed  Continue with her PT OT    History     Patient is a very pleasant 65 year old female who is admitted to LTC  Patient has a known history of Parkinson's and continues on her Sinemet.  She was recently admitted to the hospital with back pain and paraparesis bilateral lower extremity.  Noted to have significant spinal stenosis.  Pain optimized using gabapentin and baclofen.  Neurosurgery recommended decompression surgery but patient wants to defer it for now  Was in TCU and currently moved to long-term care.      Past Medical & Surgical History     PAST MEDICAL HISTORY:   Past  Medical History:   Diagnosis Date     Parkinson's disease (H) 2019      PAST SURGICAL HISTORY:   has a past surgical history that includes Breast surgery and GYN surgery.      Past Social History     Reviewed,  reports that she has never smoked. She has never been exposed to tobacco smoke. She has never used smokeless tobacco. She reports current alcohol use. She reports that she does not use drugs.    Family History     Reviewed, and family history includes Alzheimer Disease in her mother; Arthritis in her father; Diabetes in her brother and paternal grandmother; Hypertension in her father and mother.    Medication List     Current Outpatient Medications   Medication Sig Dispense Refill     acetaminophen (TYLENOL) 325 MG tablet Take 2 tablets (650 mg) by mouth every 4 hours as needed for mild pain or other (and adjunct with moderate or severe pain or per patient request). (Patient taking differently: Take 650 mg by mouth every 4 hours as needed for mild pain or other (and adjunct with moderate or severe pain or per patient request). Also give 650 mg TID not to exceed 4 gm in 24 hours.) 30 tablet 1     aspirin 81 MG EC tablet Take 81 mg by mouth 2 times daily.       Baclofen (LIORESAL) 5 MG tablet Take 1 tablet (5 mg) by mouth 3 times daily as needed for muscle spasms. 20 tablet 0     carbidopa-levodopa (RYTARY) 48. MG CPCR per CR capsule Take 1 capsule by mouth 3 times daily. 270 capsule 1     carbidopa-levodopa (SINEMET)  MG tablet Take 1 tablet by mouth 3 times daily. 270 tablet 1     cyanocobalamin (VITAMIN B-12) 1000 MCG tablet Take 1 tablet (1,000 mcg) by mouth daily. 30 tablet 1     gabapentin (NEURONTIN) 100 MG capsule Take 2 capsules (200 mg) by mouth 3 times daily. 90 capsule 0     Lidocaine (LIDOCARE) 4 % Patch Place 1 patch over 12 hours onto the skin every 24 hours. To prevent lidocaine toxicity, patient should be patch free for 12 hrs daily. 14 patch 1     melatonin 3 MG tablet Take 6 mg by  "mouth at bedtime.       oxyCODONE (ROXICODONE) 5 MG tablet Take 0.5 tablets (2.5 mg) by mouth every 4 hours as needed for breakthrough pain. 30 tablet 0     senna-docusate (SENOKOT-S/PERICOLACE) 8.6-50 MG tablet Take 1 tablet by mouth 2 times daily as needed for constipation. 20 tablet 0     thiamine (B-1) 100 MG tablet Take 1 tablet (100 mg) by mouth daily. 90 tablet 0     vitamin C (ASCORBIC ACID) 500 MG tablet Take 500 mg by mouth daily.       Vitamin D3 (VITAMIN D) 10 MCG (400 UNIT) tablet Take 400 Units by mouth daily.       No current facility-administered medications for this visit.      MED REC REQUIRED  Post Medication Reconciliation Status:        Allergies     No Known Allergies    Review of Systems   A comprehensive review of 14 systems was done. Pertinent findings noted here and in history of present illness. All the rest negative.  Constitutional: Negative.  Negative for fever, chills, she has  activity change, appetite change and fatigue.   HENT: Negative for congestion and facial swelling.    Eyes: Negative for photophobia, redness and visual disturbance.   Respiratory: Negative for cough and chest tightness.    Cardiovascular: Negative for chest pain, palpitations and leg swelling.   Gastrointestinal: Negative for nausea, diarrhea, constipation, blood in stool and abdominal distention.   Genitourinary: Negative.    Musculoskeletal: Negative.  Reporting 2 falls in the last couple of months  Currently wheelchair-bound  Has leg weakness   Skin: Negative.    Neurological: Negative for dizziness, tremors, syncope, weakness, light-headedness and headaches.   Hematological: Does not bruise/bleed easily.   Psychiatric/Behavioral: Negative.  has chronic insomnia and reports she does not sleep more than 2 hours a night      Physical Exam   BP (!) 152/76   Pulse 76   Temp 98.7  F (37.1  C)   Resp 19   Ht 1.676 m (5' 6\")   Wt 54.4 kg (120 lb)   LMP  (LMP Unknown)   SpO2 98%   BMI 19.37 kg/m   "     Constitutional: Oriented to person, place, and time and appears well-developed.   HEENT:  Normocephalic and atraumatic.  Eyes: Conjunctivae and EOM are normal. Pupils are equal, round, and reactive to light. No discharge.  No scleral icterus. Nose normal. Mouth/Throat: Oropharynx is clear and moist. No oropharyngeal exudate.    NECK: Normal range of motion. Neck supple. No JVD present. No tracheal deviation present. No thyromegaly present.   CARDIOVASCULAR: Normal rate, regular rhythm and intact distal pulses.  Exam reveals no gallop and no friction rub.  Systolic murmur present.  PULMONARY: Effort normal and breath sounds normal. No respiratory distress.No Wheezing or rales.  ABDOMEN: Soft. Bowel sounds are normal. No distension and no mass.  There is no tenderness. There is no rebound and no guarding. No HSM.  MUSCULOSKELETAL: Normal range of motion. Mild kyphosis, no tenderness.  LYMPH NODES: Has no cervical, supraclavicular, axillary and groin adenopathy.   NEUROLOGICAL: Alert and oriented to person, place, and time. No cranial nerve deficit.  Normal muscle tone. Coordination normal.   Tremors noted both upper extremities  GENITOURINARY: Deferred exam.  SKIN: Skin is warm and dry. No rash noted. No erythema. No pallor.   EXTREMITIES: No cyanosis, no clubbing, no edema. No Deformity.  PSYCHIATRIC: Normal mood, affect and behavior.  Chronic insomnia reported      Lab Results     Last Comprehensive Metabolic Panel:  Lab Results   Component Value Date     02/19/2025    POTASSIUM 4.2 02/19/2025    CHLORIDE 104 02/19/2025    CO2 27 02/19/2025    ANIONGAP 11 02/19/2025    GLC 91 02/19/2025    BUN 19.3 02/19/2025    CR 0.53 02/19/2025    GFRESTIMATED >90 02/19/2025    TITO 9.4 02/19/2025            Electronically signed by    Jeannie Mathew MD                            Sincerely,        CONOR Fuller    Electronically signed

## 2025-04-24 NOTE — CONFIDENTIAL NOTE
[facility] Geriatrics Telephone Encounter    HPI: 66 yo in TCU following recent hospitalization with complications of PD, deconditioning, FTT    Reason for Call: Nursing calling to report. RLE pitting edema to shin, non-pitting in foot. Increased pain using tylenol and oxycodone frequently. Symptoms ongoing x2 days. No known fall or injury    Onset: acute       A/P: acute lower extremity edema, nursing concerned for possible DVT of which she is at risk for due to acute on chronic immobility. Pain adequately controlled on current regimen. Encouraged elevation. Continue PRN tylenol/oxycodone. OK for WBAT.     Orders  Marlene Merida  1960     Doppler RLE to r/o DVT        PARRISH Sanchez CNP on 4/23/2025 at 7:05 PM

## 2025-05-19 ENCOUNTER — TELEPHONE (OUTPATIENT)
Dept: NEUROLOGY | Facility: CLINIC | Age: 65
End: 2025-05-19
Payer: COMMERCIAL

## 2025-05-19 DIAGNOSIS — G20.A1 PARKINSON'S DISEASE, UNSPECIFIED WHETHER DYSKINESIA PRESENT, UNSPECIFIED WHETHER MANIFESTATIONS FLUCTUATE (H): ICD-10-CM

## 2025-05-19 NOTE — TELEPHONE ENCOUNTER
TRC and provided clinic phone number.    Virginia Laurent RN, BSN  [facility]

## 2025-05-19 NOTE — TELEPHONE ENCOUNTER
Health Call Center    Phone Message    May a detailed message be left on voicemail: yes     Reason for Call: Medication Question or concern regarding medication   Prescription Clarification  Name of Medication: carbidopa-levodopa (RYTARY) 48. MG CPCR per CR capsule   Prescribing Provider: Dr. Bridger Niño   Pharmacy:    What on the order needs clarification? Marlene is inquiring when the extended release starts kicking in. Marlene stated that she feels as though it is taking longer and is at different times when she feels it kick in. Please call Marlene to discuss at your earliest convenience.    Action Taken: Message routed to:  Other: [facility]    Travel Screening: Not Applicable     Date of Service:

## 2025-05-20 ENCOUNTER — NURSING HOME VISIT (OUTPATIENT)
Dept: GERIATRICS | Facility: CLINIC | Age: 65
End: 2025-05-20
Payer: COMMERCIAL

## 2025-05-20 VITALS
WEIGHT: 122 LBS | DIASTOLIC BLOOD PRESSURE: 73 MMHG | SYSTOLIC BLOOD PRESSURE: 116 MMHG | RESPIRATION RATE: 18 BRPM | BODY MASS INDEX: 19.69 KG/M2 | TEMPERATURE: 97.6 F | OXYGEN SATURATION: 98 % | HEART RATE: 75 BPM

## 2025-05-20 DIAGNOSIS — M25.551 PAIN IN JOINT INVOLVING RIGHT PELVIC REGION AND THIGH: ICD-10-CM

## 2025-05-20 DIAGNOSIS — M54.41 CHRONIC BILATERAL LOW BACK PAIN WITH BILATERAL SCIATICA: ICD-10-CM

## 2025-05-20 DIAGNOSIS — Z74.09 IMPAIRED MOBILITY AND ACTIVITIES OF DAILY LIVING: ICD-10-CM

## 2025-05-20 DIAGNOSIS — F06.8 PSYCHOSIS DUE TO PARKINSON'S DISEASE (H): ICD-10-CM

## 2025-05-20 DIAGNOSIS — G20.A1 PARKINSON'S DISEASE, UNSPECIFIED WHETHER DYSKINESIA PRESENT, UNSPECIFIED WHETHER MANIFESTATIONS FLUCTUATE (H): Primary | ICD-10-CM

## 2025-05-20 DIAGNOSIS — G47.9 SLEEP DISTURBANCES: ICD-10-CM

## 2025-05-20 DIAGNOSIS — R62.7 FAILURE TO THRIVE IN ADULT: ICD-10-CM

## 2025-05-20 DIAGNOSIS — G89.29 CHRONIC BILATERAL LOW BACK PAIN WITH BILATERAL SCIATICA: ICD-10-CM

## 2025-05-20 DIAGNOSIS — M54.42 CHRONIC BILATERAL LOW BACK PAIN WITH BILATERAL SCIATICA: ICD-10-CM

## 2025-05-20 DIAGNOSIS — G20.A1 PSYCHOSIS DUE TO PARKINSON'S DISEASE (H): ICD-10-CM

## 2025-05-20 DIAGNOSIS — Z78.9 IMPAIRED MOBILITY AND ACTIVITIES OF DAILY LIVING: ICD-10-CM

## 2025-05-20 PROCEDURE — 99309 SBSQ NF CARE MODERATE MDM 30: CPT | Performed by: NURSE PRACTITIONER

## 2025-05-20 NOTE — PROGRESS NOTES
[facility] GERIATRICS  REGULATORY VISIT  May 20, 2025      [facility] Medical Record Number:  [id number]  Place of Service where encounter took place:  [facility] [place] () [[id number]]    Chief Complaint   Patient presents with    [facility] Regulatory       HPI:    Marlene Merida is a 65 year old  (1960), who is being seen today for a federally mandated E/M visit. HPI information obtained from: facility chart records, facility staff, patient report, and [facility] chart review.    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  Was not on her end of the hallway at lunch time and found her by the other dining room sitting next to her old roommate.  Asked if she was willing to come talk with this nurse practitioner where it was more quiet and private.    Marlene got up and walked without assistance to the [facility] area and sat down the couches.  She actually sat warming the arm of the couch with her legs spread out in front of her.  Could see movements in her extremities but would not classify it as jerking.  More like a restless movement.    Marlene states she is feeling pretty good.  She states she does continue to ask staff to be very mindful to give her Sinemet and Rytary in a timely fashion to help with her tremors that usually start occurring before she gets the medication.    Staff note that she does not sleep well at night.   Asked Marlene about this to offer her other medications.  She states she has not been sleeping well for 30 years and has tried many things but is not interested in changing her melatonin dose or adding another medication.    ALLERGIES:  No Known Allergies     Past Medical, Surgical, Family and Social History: Reviewed and updated in EPIC.    MEDICATIONS:  Post Discharge Medication Reconciliation Status: patient was not discharged from an inpatient facility or TCU.     Current Outpatient Medications   Medication Sig Dispense Refill    acetaminophen (TYLENOL) 325 MG tablet Take 2 tablets (650 mg) by mouth every 4 hours as needed for mild pain or other (and adjunct with moderate or severe pain or per patient request). (Patient taking differently: Take 650 mg by mouth every 4 hours as needed for mild pain or other (and adjunct with moderate or severe pain or per patient request). Also give 650 mg TID not to exceed 4 gm in 24 hours.) 30 tablet 1    aspirin 81 MG EC tablet Take 81 mg by mouth 2 times daily.      Baclofen (LIORESAL) 5 MG tablet Take 1 tablet (5 mg) by mouth 3 times daily as needed for muscle spasms. 20 tablet 0    carbidopa-levodopa (RYTARY) 48. MG CPCR per CR capsule Take 1 capsule by mouth 3 times daily. 270 capsule 1    carbidopa-levodopa (SINEMET)  MG tablet Take 1 tablet by mouth 3 times daily. 270 tablet 1    cyanocobalamin (VITAMIN B-12) 1000 MCG tablet Take 1 tablet (1,000 mcg) by mouth daily. 30 tablet 1    gabapentin (NEURONTIN) 100 MG capsule Take 2 capsules (200 mg) by mouth 3 times daily. 90 capsule 0    Lidocaine (LIDOCARE) 4 % Patch Place 1 patch over 12 hours onto the skin every 24 hours. To prevent lidocaine toxicity, patient should be patch free for 12 hrs daily. 14 patch 1    melatonin 3 MG tablet Take 6 mg by mouth at bedtime.      oxyCODONE (ROXICODONE) 5 MG tablet Take 0.5 tablets (2.5 mg) by mouth every 4 hours as needed for breakthrough pain. 30 tablet 0    senna-docusate  (SENOKOT-S/PERICOLACE) 8.6-50 MG tablet Take 1 tablet by mouth 2 times daily as needed for constipation. 20 tablet 0    thiamine (B-1) 100 MG tablet Take 1 tablet (100 mg) by mouth daily. 90 tablet 0    vitamin C (ASCORBIC ACID) 500 MG tablet Take 500 mg by mouth daily.      Vitamin D3 (VITAMIN D) 10 MCG (400 UNIT) tablet Take 400 Units by mouth daily.         REVIEW OF SYSTEMS:  4 point ROS neg other than the symptoms noted above in the HPI. Complained of the sciatica pain down both legs.        PHYSICAL EXAM:  /73   Pulse 75   Temp 97.6  F (36.4  C)   Resp 18   Wt 55.3 kg (122 lb)   LMP  (LMP Unknown)   SpO2 98%   BMI 19.69 kg/m    Marlene did ambulate a short distance without difficulties with this nurse practitioner but then later on after lunch did see staff standby assist with her down the hallway back to her room area.    Skin is pink, dry, warm to touch  Heart rate is regular and strong.  No edema in her lower extremities  No respiratory distress, able to take deep breaths upon command  Abdomen is flat soft and nontender  Do see movements in her extremities but otherwise she has range of motion present in all 4 extremities.  Gait can be unsteady  No focal deficits noted.  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  Do not hear anything from staff about behaviors towards others.  She can be anxious and that is a common denominator.  She only asks that her Sinemet be given in a timely fashion.      (Z74.09,  Z78.9) Impaired mobility and activities of daily  living  (M54.42,  M54.41,  G89.29) Chronic bilateral low back pain with bilateral sciatica  (M25.551) Pain in joint involving right pelvic region and thigh  Comment: Currently Marlene receives gabapentin 200 mg 3 times a day, lidocaine patch topically for 12 hours on and 12 hours off.  She does have a order for Tylenol 650 mg 3 times a day and then also 650 mg every 4 hours as needed.  Biofreeze is available as needed as well.  It is good to see Marlene ambulate around but also knowing staff are there to help Be an assist as risk of falls and tremors.  She made it clear she is not asking for any changes in her medication regime  Plan: acetaminophen (TYLENOL) 325 MG tablet    (R62.7) Failure to thrive in adult  Comment: Can see where Marlene would be a good failure to thrive if she was out in the community.  At least here she has nursing monitoring her 24/7 to meet any of her needs.  She does have a couple good friends here on the floor and staff have been accommodating to move her to a room that would make her less nervous with that roommate.  Vitals are taken on a weekly basis.  Weight is staying around 122 to 124 pounds    (G47.9) Sleep disturbances  Comment: Will remain on melatonin 6 mg at bedtime.  The gabapentin can also help her sleep at night.  She does not want to entertain any thoughts of medications being added.  Happen if mentioned trazodone and she stated trazodone was the start of all her problems.    Orders:  No new orders    Electronically signed by  PARRISH Aly CNP

## 2025-05-20 NOTE — LETTER
5/20/2025      Marlene Merida  [address]   [address]  [place] [address]        [facility] GERIATRICS  REGULATORY VISIT  May 20, 2025      [facility] Medical Record Number:  [id number]  Place of Service where encounter took place:  [facility] [place] () [[id number]]    Chief Complaint   Patient presents with     [facility] Regulatory       HPI:    Marlene Merida is a 65 year old  (1960), who is being seen today for a federally mandated E/M visit. HPI information obtained from: facility chart records, facility staff, patient report, and [facility] chart review.    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  Was not on her end of the hallway at lunch time and found her by the other dining room sitting next to her old roommate.  Asked if she was willing to come talk with this nurse practitioner where it was more quiet and private.    Marlene got up and walked without assistance to the [facility] area and sat down the couches.  She actually sat warming the arm of the couch with her legs spread out in front of her.  Could see movements in her extremities but would not classify it as jerking.  More like a restless movement.    Marlene states she is feeling pretty good.  She states she does continue to ask staff to be very mindful to give her Sinemet and Rytary in a timely fashion to help with her tremors that usually start  occurring before she gets the medication.    Staff note that she does not sleep well at night.  Asked Marlene about this to offer her other medications.  She states she has not been sleeping well for 30 years and has tried many things but is not interested in changing her melatonin dose or adding another medication.    ALLERGIES:  No Known Allergies     Past Medical, Surgical, Family and Social History: Reviewed and updated in EPIC.    MEDICATIONS:  Post Discharge Medication Reconciliation Status: patient was not discharged from an inpatient facility or TCU.     Current Outpatient Medications   Medication Sig Dispense Refill     acetaminophen (TYLENOL) 325 MG tablet Take 2 tablets (650 mg) by mouth every 4 hours as needed for mild pain or other (and adjunct with moderate or severe pain or per patient request). (Patient taking differently: Take 650 mg by mouth every 4 hours as needed for mild pain or other (and adjunct with moderate or severe pain or per patient request). Also give 650 mg TID not to exceed 4 gm in 24 hours.) 30 tablet 1     aspirin 81 MG EC tablet Take 81 mg by mouth 2 times daily.       Baclofen (LIORESAL) 5 MG tablet Take 1 tablet (5 mg) by mouth 3 times daily as needed for muscle spasms. 20 tablet 0     carbidopa-levodopa (RYTARY) 48. MG CPCR per CR capsule Take 1 capsule by mouth 3 times daily. 270 capsule 1     carbidopa-levodopa (SINEMET)  MG tablet Take 1 tablet by mouth 3 times daily. 270 tablet 1     cyanocobalamin (VITAMIN B-12) 1000 MCG tablet Take 1 tablet (1,000 mcg) by mouth daily. 30 tablet 1     gabapentin (NEURONTIN) 100 MG capsule Take 2 capsules (200 mg) by mouth 3 times daily. 90 capsule 0     Lidocaine (LIDOCARE) 4 % Patch Place 1 patch over 12 hours onto the skin every 24 hours. To prevent lidocaine toxicity, patient should be patch free for 12 hrs daily. 14 patch 1     melatonin 3 MG tablet Take 6 mg by mouth at bedtime.       oxyCODONE (ROXICODONE) 5 MG tablet Take  0.5 tablets (2.5 mg) by mouth every 4 hours as needed for breakthrough pain. 30 tablet 0     senna-docusate (SENOKOT-S/PERICOLACE) 8.6-50 MG tablet Take 1 tablet by mouth 2 times daily as needed for constipation. 20 tablet 0     thiamine (B-1) 100 MG tablet Take 1 tablet (100 mg) by mouth daily. 90 tablet 0     vitamin C (ASCORBIC ACID) 500 MG tablet Take 500 mg by mouth daily.       Vitamin D3 (VITAMIN D) 10 MCG (400 UNIT) tablet Take 400 Units by mouth daily.         REVIEW OF SYSTEMS:  4 point ROS neg other than the symptoms noted above in the HPI. Complained of the sciatica pain down both legs.        PHYSICAL EXAM:  /73   Pulse 75   Temp 97.6  F (36.4  C)   Resp 18   Wt 55.3 kg (122 lb)   LMP  (LMP Unknown)   SpO2 98%   BMI 19.69 kg/m    Marlene did ambulate a short distance without difficulties with this nurse practitioner but then later on after lunch did see staff standby assist with her down the hallway back to her room area.    Skin is pink, dry, warm to touch  Heart rate is regular and strong.  No edema in her lower extremities  No respiratory distress, able to take deep breaths upon command  Abdomen is flat soft and nontender  Do see movements in her extremities but otherwise she has range of motion present in all 4 extremities.  Gait can be unsteady  No focal deficits noted.  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  Do not hear anything from staff about behaviors towards others.  She can be anxious and that is a common denominator.  She only asks that her  Sinemet be given in a timely fashion.      (Z74.09,  Z78.9) Impaired mobility and activities of daily living  (M54.42,  M54.41,  G89.29) Chronic bilateral low back pain with bilateral sciatica  (M25.551) Pain in joint involving right pelvic region and thigh  Comment: Currently Marlene receives gabapentin 200 mg 3 times a day, lidocaine patch topically for 12 hours on and 12 hours off.  She does have a order for Tylenol 650 mg 3 times a day and then also 650 mg every 4 hours as needed.  Biofreeze is available as needed as well.  It is good to see Marlene ambulate around but also knowing staff are there to help Be an assist as risk of falls and tremors.  She made it clear she is not asking for any changes in her medication regime  Plan: acetaminophen (TYLENOL) 325 MG tablet    (R62.7) Failure to thrive in adult  Comment: Can see where Marlene would be a good failure to thrive if she was out in the community.  At least here she has nursing monitoring her 24/7 to meet any of her needs.  She does have a couple good friends here on the floor and staff have been accommodating to move her to a room that would make her less nervous with that roommate.  Vitals are taken on a weekly basis.  Weight is staying around 122 to 124 pounds    (G47.9) Sleep disturbances  Comment: Will remain on melatonin 6 mg at bedtime.  The gabapentin can also help her sleep at night.  She does not want to entertain any thoughts of medications being added.  Happen if mentioned trazodone and she stated trazodone was the start of all her problems.    Orders:  No new orders    Electronically signed by  PARRISH Aly CNP            Sincerely,        PARRISH Aly CNP    Electronically signed

## 2025-05-21 PROBLEM — G89.29 ACUTE ON CHRONIC BACK PAIN: Status: ACTIVE | Noted: 2025-05-21

## 2025-05-21 PROBLEM — M54.9 ACUTE ON CHRONIC BACK PAIN: Status: ACTIVE | Noted: 2025-05-21

## 2025-05-21 RX ORDER — ACETAMINOPHEN 325 MG/1
TABLET ORAL
Status: SHIPPED
Start: 2025-02-24

## 2025-05-22 NOTE — TELEPHONE ENCOUNTER
RN returned call to Marlene, we discussed concern that she is having breakthrough symptoms (tremors, stiffening) that start about 3.5 hours after last dose of medication and last about 1 hour, then reduce. Is noticing tremors/stiffening 3.5 hours after each dose of her medication.     Taking CD/LD IR - 1 tablet TID (12am, 8am, 4pm)  Taking Rytary 48.- 1 capsule TID (12am, 8am, 4pm)    She is asking if any medication adjustments can be made to minimize these symptoms occurring half way between doses?     Los BURDICK RN, BSN  [facility] Neurology

## 2025-05-27 RX ORDER — CARBIDOPA AND LEVODOPA 25; 100 MG/1; MG/1
1.5 TABLET ORAL 3 TIMES DAILY
Qty: 405 TABLET | Refills: 1 | Status: SHIPPED | OUTPATIENT
Start: 2025-05-27

## 2025-05-27 NOTE — TELEPHONE ENCOUNTER
Let's have Marlene increase the immediate acting carbidopa/levodopa doses to 1.5 tablets (still TID).  Continue current dosing of Rytary.  I will update her prescription order.    I recommend she give this a few weeks and then send an update. We can adjust further if needed.     Thank you,  Dr. Niño

## 2025-05-28 NOTE — TELEPHONE ENCOUNTER
RN called Marlene and relayed the following from Dr. Niño:    Let's have Marlene increase the immediate acting carbidopa/levodopa doses to 1.5 tablets (still TID).  Continue current dosing of Rytary.  I will update her prescription order.  I recommend she give this a few weeks and then send an update. We can adjust further if needed.     Marlene expressed understanding.    Virginia Laurent RN, BSN  [facility] Neurology

## 2025-05-30 ENCOUNTER — TELEPHONE (OUTPATIENT)
Dept: NEUROLOGY | Facility: CLINIC | Age: 65
End: 2025-05-30
Payer: COMMERCIAL

## 2025-05-30 NOTE — TELEPHONE ENCOUNTER
[facility] Call Center    Phone Message    May a detailed message be left on voicemail: yes     Reason for Call:     Jade, RN at [facility] called to speak to care team to discuss her medication carbidopa-levodopa (SINEMET)  MG tablet. Patient informed nurses that when she last saw ,  planned on changing her medication. However patient has not seen  recently. Please call back to discuss [phone]         Action Taken: Other: [facility] neurology    Travel Screening: Not Applicable     Date of Service:

## 2025-05-30 NOTE — TELEPHONE ENCOUNTER
"Reviewed chart. See 5/19/2025 TE; Sinemet dose was increased. Pt was instructed to continue the current dose of Rytary.    carbidopa-levodopa (SINEMET)  MG tablet   Sig - Route: Take 1.5 tablets by mouth 3 times daily. - Oral     Per Dr. Niño:  \"I recommend she give this a few weeks and then send an update. We can adjust further if needed.\"    Attempted to call Jade to relay the information above. LMTRC.    Kerline BERUMEN RN, BSN  [facility] Neurology  "

## 2025-06-02 NOTE — TELEPHONE ENCOUNTER
CD/LD  mg order faxed to [phone] per request.    Virginia Laurent RN, BSN  [facility]

## 2025-06-02 NOTE — TELEPHONE ENCOUNTER
[facility] Call Center    Phone Message    May a detailed message be left on voicemail: yes     Reason for Call: MARCUS Hansen returning a call they missed from the clinic. States that they would like the clinic to fax over the medication orders to [phone]. Please reach back out to Jade with any questions at [phone].    Action Taken: Message routed to:  [facility]: [facility]

## 2025-06-02 NOTE — TELEPHONE ENCOUNTER
LDM for Jade, RN informing that CD/LD 25-100mg was increased to 1.5 tabls TID on 5/27/25. Provider advises that Marlene take this dose for a few weeks and then send an update, can be adjusted further as needed.     Encouraged call back if any further questions or rx order needs to be faxed to RN team at care facility. Rx was sent to [facility] pharmacy.     Los BURDICK RN, BSN  [facility] Neurology

## 2025-06-07 ENCOUNTER — HEALTH MAINTENANCE LETTER (OUTPATIENT)
Age: 65
End: 2025-06-07

## 2025-06-24 NOTE — PROGRESS NOTES
[facility] GERIATRIC SERVICES       Patient Marlene Merida  MRN: [id number]        Reason for Visit     Chief Complaint   Patient presents with    detention Regulatory       Code Status     CPR/Full code     Assessment     Parkinsons  Psychosis with hallucinations  Chronic back pain/ bilateral paraparesis with spina stenosis  FTT  Spinal stenosis  Impaired mobility and ADLS  Chronic insomnia on melatonin and trazodone.  Generalized weakness    Plan     Pt is admitted to long-term care  Patient was recently in the hospital with increased weakness as well as deconditioning with bilateral paraparesis  Noted to have spinal stenosis of her cervical spine  Neurosurgery consulted and requested surgical decompression but patient is adamant that she will not have any surgeries  Currently wheelchair-bound  However now reports that she is ambulating  Pain has improved.  Parkinson's is controlled and at baseline.  Neurology notes reviewed and they have recommended continuing Sinemet  She reports 2 falls in the last 3 months  Patient is reporting she does not want to take anything for insomnia.  Melatonin is giving her hallucinations.  Discontinue trazodone and melatonin.  Now awaiting placement in assisted living facility per patient  Continue with her PT OT    History     Patient is a very pleasant 65 year old female who is a resident of [facility]  Patient has a known history of Parkinson's and continues on her Sinemet.  She follows with neurology  She was recently admitted to the hospital with back pain and paraparesis bilateral lower extremity.  Noted to have significant spinal stenosis.  Pain optimized using gabapentin and baclofen.  Neurosurgery recommended decompression surgery but patient wants to defer it for now  Was in TCU and currently moved to long-term care.  Now reporting she is ambulating and overall has progressed with therapy she is planning to move to an [facility] soon      Past Medical & Surgical History     PAST MEDICAL  HISTORY:   Past Medical History:   Diagnosis Date    Parkinson's disease (H) 2019      PAST SURGICAL HISTORY:   has a past surgical history that includes Breast surgery and GYN surgery.      Past Social History     Reviewed,  reports that she has never smoked. She has never been exposed to tobacco smoke. She has never used smokeless tobacco. She reports current alcohol use. She reports that she does not use drugs.    Family History     Reviewed, and family history includes Alzheimer Disease in her mother; Arthritis in her father; Diabetes in her brother and paternal grandmother; Hypertension in her father and mother.    Medication List     Current Outpatient Medications   Medication Sig Dispense Refill    acetaminophen (TYLENOL) 325 MG tablet 650 mg TID and 650mg every 4 hours as needed, not to exceed 4 gm in 24 hours.      aspirin 81 MG EC tablet Take 81 mg by mouth 2 times daily.      Baclofen (LIORESAL) 5 MG tablet Take 1 tablet (5 mg) by mouth 3 times daily as needed for muscle spasms. 20 tablet 0    carbidopa-levodopa (RYTARY) 48. MG CPCR per CR capsule Take 1 capsule by mouth 3 times daily. 270 capsule 1    carbidopa-levodopa (SINEMET)  MG tablet Take 1.5 tablets by mouth 3 times daily. 405 tablet 1    cyanocobalamin (VITAMIN B-12) 1000 MCG tablet Take 1 tablet (1,000 mcg) by mouth daily. 30 tablet 1    gabapentin (NEURONTIN) 100 MG capsule Take 2 capsules (200 mg) by mouth 3 times daily. 90 capsule 0    Lidocaine (LIDOCARE) 4 % Patch Place 1 patch over 12 hours onto the skin every 24 hours. To prevent lidocaine toxicity, patient should be patch free for 12 hrs daily. 14 patch 1    melatonin 3 MG tablet Take 6 mg by mouth at bedtime.      oxyCODONE (ROXICODONE) 5 MG tablet Take 0.5 tablets (2.5 mg) by mouth every 4 hours as needed for breakthrough pain. 30 tablet 0    senna-docusate (SENOKOT-S/PERICOLACE) 8.6-50 MG tablet Take 1 tablet by mouth 2 times daily as needed for constipation. 20 tablet 0  "   thiamine (B-1) 100 MG tablet Take 1 tablet (100 mg) by mouth daily. 90 tablet 0    vitamin C (ASCORBIC ACID) 500 MG tablet Take 500 mg by mouth daily.      Vitamin D3 (VITAMIN D) 10 MCG (400 UNIT) tablet Take 400 Units by mouth daily.       No current facility-administered medications for this visit.      MED REC REQUIRED  Post Medication Reconciliation Status:        Allergies     No Known Allergies    Review of Systems   A comprehensive review of 14 systems was done. Pertinent findings noted here and in history of present illness. All the rest negative.  Constitutional: Negative.  Negative for fever, chills, she has  activity change, appetite change and fatigue.   HENT: Negative for congestion and facial swelling.    Eyes: Negative for photophobia, redness and visual disturbance.   Respiratory: Negative for cough and chest tightness.    Cardiovascular: Negative for chest pain, palpitations and leg swelling.   Gastrointestinal: Negative for nausea, diarrhea, constipation, blood in stool and abdominal distention.   Genitourinary: Negative.    Musculoskeletal: Negative.  Reporting 2 falls in the last couple of months  Currently wheelchair-bound  Has leg weakness but reporting improvement and now ambulating  Skin: Negative.    Neurological: Negative for dizziness, tremors, syncope, weakness, light-headedness and headaches.   Hematological: Does not bruise/bleed easily.   Psychiatric/Behavioral: Negative.  has chronic insomnia   However she does not want to take any of her insomnia medications any longer      Physical Exam   /79   Pulse 80   Temp 98.3  F (36.8  C)   Resp 18   Ht 1.676 m (5' 6\")   Wt 58.1 kg (128 lb)   LMP  (LMP Unknown)   SpO2 96%   BMI 20.66 kg/m       Constitutional: Oriented to person, place, and time and appears well-developed.   HEENT:  Normocephalic and atraumatic.  Eyes: Conjunctivae and EOM are normal. Pupils are equal, round, and reactive to light. No discharge.  No scleral " icterus. Nose normal. Mouth/Throat: Oropharynx is clear and moist. No oropharyngeal exudate.    NECK: Normal range of motion. Neck supple. No JVD present. No tracheal deviation present. No thyromegaly present.   CARDIOVASCULAR: Normal rate, regular rhythm and intact distal pulses.  Exam reveals no gallop and no friction rub.  Systolic murmur present.  PULMONARY: Effort normal and breath sounds normal. No respiratory distress.No Wheezing or rales.  ABDOMEN: Soft. Bowel sounds are normal. No distension and no mass.  There is no tenderness. There is no rebound and no guarding. No HSM.  MUSCULOSKELETAL: Normal range of motion. Mild kyphosis, no tenderness.  LYMPH NODES: Has no cervical, supraclavicular, axillary and groin adenopathy.   NEUROLOGICAL: Alert and oriented to person, place, and time. No cranial nerve deficit.  Normal muscle tone. Coordination normal.   Tremors noted both upper extremities  GENITOURINARY: Deferred exam.  SKIN: Skin is warm and dry. No rash noted. No erythema. No pallor.   EXTREMITIES: No cyanosis, no clubbing, no edema. No Deformity.  PSYCHIATRIC: Normal mood, affect and behavior.  Chronic insomnia reported      Lab Results     Last Comprehensive Metabolic Panel:  Lab Results   Component Value Date     02/19/2025    POTASSIUM 4.2 02/19/2025    CHLORIDE 104 02/19/2025    CO2 27 02/19/2025    ANIONGAP 11 02/19/2025    GLC 91 02/19/2025    BUN 19.3 02/19/2025    CR 0.53 02/19/2025    GFRESTIMATED >90 02/19/2025    TITO 9.4 02/19/2025            Electronically signed by    Jeannie Mathew MD

## 2025-06-25 ENCOUNTER — NURSING HOME VISIT (OUTPATIENT)
Dept: GERIATRICS | Facility: CLINIC | Age: 65
End: 2025-06-25
Payer: COMMERCIAL

## 2025-06-25 VITALS
HEIGHT: 66 IN | DIASTOLIC BLOOD PRESSURE: 79 MMHG | SYSTOLIC BLOOD PRESSURE: 134 MMHG | BODY MASS INDEX: 20.57 KG/M2 | OXYGEN SATURATION: 96 % | HEART RATE: 80 BPM | TEMPERATURE: 98.3 F | RESPIRATION RATE: 18 BRPM | WEIGHT: 128 LBS

## 2025-06-25 DIAGNOSIS — M54.42 CHRONIC BILATERAL LOW BACK PAIN WITH BILATERAL SCIATICA: ICD-10-CM

## 2025-06-25 DIAGNOSIS — Z74.09 IMPAIRED MOBILITY AND ACTIVITIES OF DAILY LIVING: ICD-10-CM

## 2025-06-25 DIAGNOSIS — G20.A1 PARKINSON'S DISEASE, UNSPECIFIED WHETHER DYSKINESIA PRESENT, UNSPECIFIED WHETHER MANIFESTATIONS FLUCTUATE (H): Primary | ICD-10-CM

## 2025-06-25 DIAGNOSIS — R53.1 GENERAL WEAKNESS: ICD-10-CM

## 2025-06-25 DIAGNOSIS — F06.8 PSYCHOSIS DUE TO PARKINSON'S DISEASE (H): ICD-10-CM

## 2025-06-25 DIAGNOSIS — Z78.9 IMPAIRED MOBILITY AND ACTIVITIES OF DAILY LIVING: ICD-10-CM

## 2025-06-25 DIAGNOSIS — G20.A1 PSYCHOSIS DUE TO PARKINSON'S DISEASE (H): ICD-10-CM

## 2025-06-25 DIAGNOSIS — G89.29 CHRONIC BILATERAL LOW BACK PAIN WITH BILATERAL SCIATICA: ICD-10-CM

## 2025-06-25 DIAGNOSIS — M54.41 CHRONIC BILATERAL LOW BACK PAIN WITH BILATERAL SCIATICA: ICD-10-CM

## 2025-06-25 NOTE — LETTER
6/25/2025      Marlene Merida  [address]   [address]  [place] [address]        [facility] GERIATRIC SERVICES       Patient Marlene Merida  MRN: [id number]        Reason for Visit     Chief Complaint   Patient presents with     senior care Regulatory       Code Status     CPR/Full code     Assessment     Parkinsons  Psychosis with hallucinations  Chronic back pain/ bilateral paraparesis with spina stenosis  FTT  Spinal stenosis  Impaired mobility and ADLS  Chronic insomnia on melatonin and trazodone.  Generalized weakness    Plan     Pt is admitted to long-term care  Patient was recently in the hospital with increased weakness as well as deconditioning with bilateral paraparesis  Noted to have spinal stenosis of her cervical spine  Neurosurgery consulted and requested surgical decompression but patient is adamant that she will not have any surgeries  Currently wheelchair-bound  However now reports that she is ambulating  Pain has improved.  Parkinson's is controlled and at baseline.  Neurology notes reviewed and they have recommended continuing Sinemet  She reports 2 falls in the last 3 months  Patient is reporting she does not want to take anything for insomnia.  Melatonin is giving her hallucinations.  Discontinue trazodone and melatonin.  Now awaiting placement in assisted living facility per patient  Continue with her PT OT    History     Patient is a very pleasant 65 year old female who is a resident of [facility]  Patient has a known history of Parkinson's and continues on her Sinemet.  She follows with neurology  She was recently admitted to the hospital with back pain and paraparesis bilateral lower extremity.  Noted to have significant spinal stenosis.  Pain optimized using gabapentin and baclofen.  Neurosurgery recommended decompression surgery but patient wants to defer it for now  Was in TCU and currently moved to long-term care.  Now reporting she is ambulating and overall has progressed with therapy she  is planning to move to an [facility] soon      Past Medical & Surgical History     PAST MEDICAL HISTORY:   Past Medical History:   Diagnosis Date     Parkinson's disease (H) 2019      PAST SURGICAL HISTORY:   has a past surgical history that includes Breast surgery and GYN surgery.      Past Social History     Reviewed,  reports that she has never smoked. She has never been exposed to tobacco smoke. She has never used smokeless tobacco. She reports current alcohol use. She reports that she does not use drugs.    Family History     Reviewed, and family history includes Alzheimer Disease in her mother; Arthritis in her father; Diabetes in her brother and paternal grandmother; Hypertension in her father and mother.    Medication List     Current Outpatient Medications   Medication Sig Dispense Refill     acetaminophen (TYLENOL) 325 MG tablet 650 mg TID and 650mg every 4 hours as needed, not to exceed 4 gm in 24 hours.       aspirin 81 MG EC tablet Take 81 mg by mouth 2 times daily.       Baclofen (LIORESAL) 5 MG tablet Take 1 tablet (5 mg) by mouth 3 times daily as needed for muscle spasms. 20 tablet 0     carbidopa-levodopa (RYTARY) 48. MG CPCR per CR capsule Take 1 capsule by mouth 3 times daily. 270 capsule 1     carbidopa-levodopa (SINEMET)  MG tablet Take 1.5 tablets by mouth 3 times daily. 405 tablet 1     cyanocobalamin (VITAMIN B-12) 1000 MCG tablet Take 1 tablet (1,000 mcg) by mouth daily. 30 tablet 1     gabapentin (NEURONTIN) 100 MG capsule Take 2 capsules (200 mg) by mouth 3 times daily. 90 capsule 0     Lidocaine (LIDOCARE) 4 % Patch Place 1 patch over 12 hours onto the skin every 24 hours. To prevent lidocaine toxicity, patient should be patch free for 12 hrs daily. 14 patch 1     melatonin 3 MG tablet Take 6 mg by mouth at bedtime.       oxyCODONE (ROXICODONE) 5 MG tablet Take 0.5 tablets (2.5 mg) by mouth every 4 hours as needed for breakthrough pain. 30 tablet 0     senna-docusate  "(SENOKOT-S/PERICOLACE) 8.6-50 MG tablet Take 1 tablet by mouth 2 times daily as needed for constipation. 20 tablet 0     thiamine (B-1) 100 MG tablet Take 1 tablet (100 mg) by mouth daily. 90 tablet 0     vitamin C (ASCORBIC ACID) 500 MG tablet Take 500 mg by mouth daily.       Vitamin D3 (VITAMIN D) 10 MCG (400 UNIT) tablet Take 400 Units by mouth daily.       No current facility-administered medications for this visit.      MED REC REQUIRED  Post Medication Reconciliation Status:        Allergies     No Known Allergies    Review of Systems   A comprehensive review of 14 systems was done. Pertinent findings noted here and in history of present illness. All the rest negative.  Constitutional: Negative.  Negative for fever, chills, she has  activity change, appetite change and fatigue.   HENT: Negative for congestion and facial swelling.    Eyes: Negative for photophobia, redness and visual disturbance.   Respiratory: Negative for cough and chest tightness.    Cardiovascular: Negative for chest pain, palpitations and leg swelling.   Gastrointestinal: Negative for nausea, diarrhea, constipation, blood in stool and abdominal distention.   Genitourinary: Negative.    Musculoskeletal: Negative.  Reporting 2 falls in the last couple of months  Currently wheelchair-bound  Has leg weakness but reporting improvement and now ambulating  Skin: Negative.    Neurological: Negative for dizziness, tremors, syncope, weakness, light-headedness and headaches.   Hematological: Does not bruise/bleed easily.   Psychiatric/Behavioral: Negative.  has chronic insomnia   However she does not want to take any of her insomnia medications any longer      Physical Exam   /79   Pulse 80   Temp 98.3  F (36.8  C)   Resp 18   Ht 1.676 m (5' 6\")   Wt 58.1 kg (128 lb)   LMP  (LMP Unknown)   SpO2 96%   BMI 20.66 kg/m       Constitutional: Oriented to person, place, and time and appears well-developed.   HEENT:  Normocephalic and " atraumatic.  Eyes: Conjunctivae and EOM are normal. Pupils are equal, round, and reactive to light. No discharge.  No scleral icterus. Nose normal. Mouth/Throat: Oropharynx is clear and moist. No oropharyngeal exudate.    NECK: Normal range of motion. Neck supple. No JVD present. No tracheal deviation present. No thyromegaly present.   CARDIOVASCULAR: Normal rate, regular rhythm and intact distal pulses.  Exam reveals no gallop and no friction rub.  Systolic murmur present.  PULMONARY: Effort normal and breath sounds normal. No respiratory distress.No Wheezing or rales.  ABDOMEN: Soft. Bowel sounds are normal. No distension and no mass.  There is no tenderness. There is no rebound and no guarding. No HSM.  MUSCULOSKELETAL: Normal range of motion. Mild kyphosis, no tenderness.  LYMPH NODES: Has no cervical, supraclavicular, axillary and groin adenopathy.   NEUROLOGICAL: Alert and oriented to person, place, and time. No cranial nerve deficit.  Normal muscle tone. Coordination normal.   Tremors noted both upper extremities  GENITOURINARY: Deferred exam.  SKIN: Skin is warm and dry. No rash noted. No erythema. No pallor.   EXTREMITIES: No cyanosis, no clubbing, no edema. No Deformity.  PSYCHIATRIC: Normal mood, affect and behavior.  Chronic insomnia reported      Lab Results     Last Comprehensive Metabolic Panel:  Lab Results   Component Value Date     02/19/2025    POTASSIUM 4.2 02/19/2025    CHLORIDE 104 02/19/2025    CO2 27 02/19/2025    ANIONGAP 11 02/19/2025    GLC 91 02/19/2025    BUN 19.3 02/19/2025    CR 0.53 02/19/2025    GFRESTIMATED >90 02/19/2025    TITO 9.4 02/19/2025            Electronically signed by    Jeannie Mathew MD                            Sincerely,        CONOR Fuller    Electronically signed

## 2025-06-26 ENCOUNTER — TELEPHONE (OUTPATIENT)
Dept: NEUROLOGY | Facility: CLINIC | Age: 65
End: 2025-06-26
Payer: COMMERCIAL

## 2025-06-26 DIAGNOSIS — G20.A1 PARKINSON'S DISEASE, UNSPECIFIED WHETHER DYSKINESIA PRESENT, UNSPECIFIED WHETHER MANIFESTATIONS FLUCTUATE (H): ICD-10-CM

## 2025-06-26 NOTE — TELEPHONE ENCOUNTER
Patient's care facility reports that the increase in dose of Sinemet is causing her to have hallucinations. Is it possible to decrease her dose back down to 1 tablet 3 times daily?    There is also documentation from Dr. Jang requesting Sinemet be reduced.    Deepika BURDICK RN  [facility]

## 2025-06-26 NOTE — TELEPHONE ENCOUNTER
Returned call to Jade from [facility] regarding concerns of Sinemet dose. [facility].    Deepika BURDICK RN  [facility]

## 2025-06-26 NOTE — TELEPHONE ENCOUNTER
Yes they should decrease the dose as instructed by Dr. Jang.  He also made me aware of his recommendations.    Thank you,  Dr. Niño

## 2025-06-26 NOTE — TELEPHONE ENCOUNTER
Spoke with Petey at [facility] regarding dose of CD/LD. He states they need a new prescription on file in order to administer one tablet TID.     New prescription can be faxed to [facility] at [phone].     Updated prescription pended for signature. Will fax once signed.     Janet TINAJERO RN, BSN  [facility]

## 2025-06-26 NOTE — TELEPHONE ENCOUNTER
Health Call Center    Phone Message    May a detailed message be left on voicemail: yes     Reason for Call: Medication Question or concern regarding medication   Prescription Clarification  Name of Medication: carbidopa-levodopa (SINEMET)  MG tablet  Prescribing Provider: Pat Leyva MD   Pharmacy: [id number] [address]    Jade from [facility] in [place] calling in. States that earlier this month the medication was increased from 1 tablet to 1.5 tablets a day, although it is now causing the patient to have an increase in hallucinations and patient is now wanting to go back to 1 tablet a day. Please review and reach back out to Jade at [phone].    Action Taken: Message routed to:  Other: [facility]

## 2025-07-01 RX ORDER — CARBIDOPA AND LEVODOPA 25; 100 MG/1; MG/1
1 TABLET ORAL 3 TIMES DAILY
Qty: 270 TABLET | Refills: 3 | Status: SHIPPED | OUTPATIENT
Start: 2025-07-01

## 2025-07-01 NOTE — TELEPHONE ENCOUNTER
New prescription faxed to [facility].    Deepika BURDICK RN  [facility]

## 2025-07-19 ENCOUNTER — HEALTH MAINTENANCE LETTER (OUTPATIENT)
Age: 65
End: 2025-07-19

## 2025-08-05 ENCOUNTER — NURSING HOME VISIT (OUTPATIENT)
Dept: GERIATRICS | Facility: CLINIC | Age: 65
End: 2025-08-05
Payer: COMMERCIAL

## 2025-08-05 VITALS
HEART RATE: 82 BPM | BODY MASS INDEX: 20.34 KG/M2 | RESPIRATION RATE: 17 BRPM | SYSTOLIC BLOOD PRESSURE: 148 MMHG | DIASTOLIC BLOOD PRESSURE: 88 MMHG | WEIGHT: 126 LBS | TEMPERATURE: 98.7 F | OXYGEN SATURATION: 96 %

## 2025-08-05 DIAGNOSIS — G20.A1 PSYCHOSIS DUE TO PARKINSON'S DISEASE (H): ICD-10-CM

## 2025-08-05 DIAGNOSIS — Z74.09 IMPAIRED MOBILITY AND ACTIVITIES OF DAILY LIVING: ICD-10-CM

## 2025-08-05 DIAGNOSIS — Z00.00 ENCOUNTER FOR MEDICARE ANNUAL WELLNESS EXAM: ICD-10-CM

## 2025-08-05 DIAGNOSIS — Z78.9 IMPAIRED MOBILITY AND ACTIVITIES OF DAILY LIVING: ICD-10-CM

## 2025-08-05 DIAGNOSIS — G47.9 SLEEP DISTURBANCES: ICD-10-CM

## 2025-08-05 DIAGNOSIS — M54.42 CHRONIC BILATERAL LOW BACK PAIN WITH BILATERAL SCIATICA: ICD-10-CM

## 2025-08-05 DIAGNOSIS — F06.8 PSYCHOSIS DUE TO PARKINSON'S DISEASE (H): ICD-10-CM

## 2025-08-05 DIAGNOSIS — M54.41 CHRONIC BILATERAL LOW BACK PAIN WITH BILATERAL SCIATICA: ICD-10-CM

## 2025-08-05 DIAGNOSIS — G89.29 CHRONIC BILATERAL LOW BACK PAIN WITH BILATERAL SCIATICA: ICD-10-CM

## 2025-08-05 DIAGNOSIS — G20.A1 PARKINSON'S DISEASE, UNSPECIFIED WHETHER DYSKINESIA PRESENT, UNSPECIFIED WHETHER MANIFESTATIONS FLUCTUATE (H): Primary | ICD-10-CM

## 2025-08-05 PROCEDURE — G0438 PPPS, INITIAL VISIT: HCPCS | Performed by: NURSE PRACTITIONER

## 2025-08-05 PROCEDURE — 99309 SBSQ NF CARE MODERATE MDM 30: CPT | Mod: 25 | Performed by: NURSE PRACTITIONER

## 2025-08-12 ENCOUNTER — DISCHARGE SUMMARY NURSING HOME (OUTPATIENT)
Dept: GERIATRICS | Facility: CLINIC | Age: 65
End: 2025-08-12
Payer: COMMERCIAL

## 2025-08-12 VITALS
HEART RATE: 73 BPM | OXYGEN SATURATION: 98 % | WEIGHT: 133 LBS | BODY MASS INDEX: 21.47 KG/M2 | RESPIRATION RATE: 18 BRPM | SYSTOLIC BLOOD PRESSURE: 155 MMHG | TEMPERATURE: 98.1 F | DIASTOLIC BLOOD PRESSURE: 87 MMHG

## 2025-08-12 DIAGNOSIS — R62.7 FAILURE TO THRIVE IN ADULT: ICD-10-CM

## 2025-08-12 DIAGNOSIS — G20.A1 PARKINSON'S DISEASE, UNSPECIFIED WHETHER DYSKINESIA PRESENT, UNSPECIFIED WHETHER MANIFESTATIONS FLUCTUATE (H): Primary | ICD-10-CM

## 2025-08-12 DIAGNOSIS — F06.8 PSYCHOSIS DUE TO PARKINSON'S DISEASE (H): ICD-10-CM

## 2025-08-12 DIAGNOSIS — M54.42 CHRONIC BILATERAL LOW BACK PAIN WITH BILATERAL SCIATICA: ICD-10-CM

## 2025-08-12 DIAGNOSIS — M54.41 CHRONIC BILATERAL LOW BACK PAIN WITH BILATERAL SCIATICA: ICD-10-CM

## 2025-08-12 DIAGNOSIS — R53.1 GENERAL WEAKNESS: ICD-10-CM

## 2025-08-12 DIAGNOSIS — Z78.9 IMPAIRED MOBILITY AND ACTIVITIES OF DAILY LIVING: ICD-10-CM

## 2025-08-12 DIAGNOSIS — Z74.09 IMPAIRED MOBILITY AND ACTIVITIES OF DAILY LIVING: ICD-10-CM

## 2025-08-12 DIAGNOSIS — G89.29 CHRONIC BILATERAL LOW BACK PAIN WITH BILATERAL SCIATICA: ICD-10-CM

## 2025-08-12 DIAGNOSIS — G47.9 SLEEP DISTURBANCES: ICD-10-CM

## 2025-08-12 DIAGNOSIS — G20.A1 PSYCHOSIS DUE TO PARKINSON'S DISEASE (H): ICD-10-CM

## 2025-08-12 PROCEDURE — 99316 NF DSCHRG MGMT 30 MIN+: CPT | Performed by: NURSE PRACTITIONER

## 2025-08-14 RX ORDER — LIDOCAINE 4 G/G
1 PATCH TOPICAL DAILY PRN
Status: SHIPPED
Start: 2025-07-07

## 2025-08-14 RX ORDER — QUETIAPINE FUMARATE 25 MG/1
12.5 TABLET, FILM COATED ORAL AT BEDTIME
Status: SHIPPED
Start: 2025-08-08